# Patient Record
Sex: FEMALE | Race: WHITE | NOT HISPANIC OR LATINO | Employment: FULL TIME | URBAN - METROPOLITAN AREA
[De-identification: names, ages, dates, MRNs, and addresses within clinical notes are randomized per-mention and may not be internally consistent; named-entity substitution may affect disease eponyms.]

---

## 2017-07-06 ENCOUNTER — OFFICE VISIT (OUTPATIENT)
Dept: URGENT CARE | Facility: CLINIC | Age: 27
End: 2017-07-06
Payer: COMMERCIAL

## 2017-07-06 VITALS
BODY MASS INDEX: 39.24 KG/M2 | OXYGEN SATURATION: 99 % | SYSTOLIC BLOOD PRESSURE: 110 MMHG | RESPIRATION RATE: 17 BRPM | HEIGHT: 67 IN | DIASTOLIC BLOOD PRESSURE: 80 MMHG | WEIGHT: 250 LBS | TEMPERATURE: 97.3 F | HEART RATE: 77 BPM

## 2017-07-06 DIAGNOSIS — T78.40XA ALLERGIC REACTION, INITIAL ENCOUNTER: ICD-10-CM

## 2017-07-06 PROCEDURE — 99214 OFFICE O/P EST MOD 30 MIN: CPT | Performed by: PHYSICIAN ASSISTANT

## 2017-07-06 RX ORDER — METHYLPREDNISOLONE SODIUM SUCCINATE 125 MG/2ML
125 INJECTION, POWDER, LYOPHILIZED, FOR SOLUTION INTRAMUSCULAR; INTRAVENOUS ONCE
Status: COMPLETED | OUTPATIENT
Start: 2017-07-06 | End: 2017-07-06

## 2017-07-06 RX ORDER — METHYLPREDNISOLONE 4 MG/1
TABLET ORAL
Qty: 1 KIT | Refills: 0 | Status: SHIPPED | OUTPATIENT
Start: 2017-07-06 | End: 2017-09-08

## 2017-07-06 RX ORDER — DIPHENHYDRAMINE HCL 25 MG
50 TABLET ORAL EVERY 6 HOURS PRN
COMMUNITY
End: 2017-09-08

## 2017-07-06 RX ORDER — TRIAMCINOLONE ACETONIDE 1 MG/G
CREAM TOPICAL
Qty: 1 TUBE | Refills: 0 | Status: SHIPPED | OUTPATIENT
Start: 2017-07-06 | End: 2017-09-08

## 2017-07-06 RX ADMIN — METHYLPREDNISOLONE SODIUM SUCCINATE 125 MG: 125 INJECTION, POWDER, LYOPHILIZED, FOR SOLUTION INTRAMUSCULAR; INTRAVENOUS at 19:00

## 2017-07-06 ASSESSMENT — ENCOUNTER SYMPTOMS
WHEEZING: 0
NAUSEA: 0
ABDOMINAL PAIN: 0
VOMITING: 0
CHILLS: 0
FEVER: 0
SHORTNESS OF BREATH: 0
DIARRHEA: 0

## 2017-07-07 NOTE — PROGRESS NOTES
Subjective:      Jaqui Kumar is a 27 y.o. female who presents with Allergic Reaction            Allergic Reaction  Associated symptoms include a rash. Pertinent negatives include no abdominal pain, chest pain, chills, congestion, coughing, diaphoresis, fever, nausea or vomiting.   c/o itchy rash, onset today, took some benadryl around 3pm, noted no change in itchiness w/ benadyrl, denies PMH of seasonal allerg, denies using new poroducts or suspected triggers, same detergent/soap/shampoo/conditioner/lotions, etc - denies new medications, denies new food products, PMH of gluten free, wheat intolerance - get nausea, denies PMH of allergies, can get runny nose from hay products. Typically no seasonalallerg. Denies any respiratory symptoms. Denies cough or stridor. Denies past medical history of anaphylaxis. Denies shortness of breath. C/o itchy rash to body, c/o itch to throat but denies sensation of swelling/cough or restricted breathing.      Review of Systems   Constitutional: Negative for fever, chills and diaphoresis.   HENT: Negative for congestion.    Respiratory: Negative for cough, shortness of breath, wheezing and stridor.    Cardiovascular: Negative for chest pain and palpitations.   Gastrointestinal: Negative for nausea, vomiting, abdominal pain and diarrhea.   Skin: Positive for itching and rash.   Neurological: Negative for dizziness, tingling, sensory change, speech change and loss of consciousness.   Endo/Heme/Allergies: Negative for environmental allergies.       PMH:  has no past medical history of Clotting disorder, Diabetes, or Type II or unspecified type diabetes mellitus without mention of complication, not stated as uncontrolled.  MEDS:   Current outpatient prescriptions:   •  cyclobenzaprine (FLEXERIL) 10 MG Tab, Take 1 Tab by mouth 3 times a day as needed., Disp: 15 Tab, Rfl: 0  •  hydrocodone-acetaminophen (NORCO) 7.5-325 MG per tablet, Take 1 Tab by mouth every 8 hours as needed., Disp: 12  "Tab, Rfl: 0  •  MedroxyPROGESTERone Acetate (DEPO-PROVERA IM), by Intramuscular route., Disp: , Rfl:   •  hydrocodone-acetaminophen (NORCO) 5-325 MG TABS per tablet, Take 1-2 Tabs by mouth every 6 hours as needed., Disp: 20 Tab, Rfl: 0  ALLERGIES: No Known Allergies  SURGHX: No past surgical history on file.  SOCHX:  reports that she has never smoked. She does not have any smokeless tobacco history on file. She reports that she drinks alcohol. She reports that she does not use illicit drugs.  FH: Family history was reviewed, no pertinent findings to report     Objective:     /80 mmHg  Pulse 77  Temp(Src) 36.3 °C (97.3 °F)  Resp 17  Ht 1.702 m (5' 7.01\")  Wt 113.399 kg (250 lb)  BMI 39.15 kg/m2  SpO2 99%     Physical Exam   Constitutional: She is oriented to person, place, and time. She appears well-developed and well-nourished. No distress.   HENT:   Head: Normocephalic and atraumatic.   Right Ear: Tympanic membrane, external ear and ear canal normal.   Left Ear: Tympanic membrane, external ear and ear canal normal.   Nose: Nose normal.   Mouth/Throat: Uvula is midline, oropharynx is clear and moist and mucous membranes are normal. No uvula swelling. No posterior oropharyngeal edema or posterior oropharyngeal erythema.   Eyes: Conjunctivae and lids are normal. Right eye exhibits no discharge. Left eye exhibits no discharge. No scleral icterus.   Neck: Neck supple.   Pulmonary/Chest: Effort normal and breath sounds normal. No accessory muscle usage. No tachypnea. No respiratory distress. She has no decreased breath sounds. She has no wheezes. She has no rhonchi. She has no rales.   Musculoskeletal: Normal range of motion.   Neurological: She is alert and oriented to person, place, and time. She is not disoriented.   Skin: Skin is warm, dry and intact. Rash noted. Rash is urticarial ( somewhat.. raised erythematous, poorly defined borders, some edema to bilat ear lobes, facea and chest). Rash is not " pustular and not vesicular. She is not diaphoretic. There is erythema. No pallor.   Psychiatric: Her speech is normal and behavior is normal.   Nursing note and vitals reviewed.         Solumedrol in clinic - tolerates well     Assessment/Plan:   1. Allergic reaction, initial encounter  Supportive care is reviewed with patient/caregiver - recommend to push PO fluids and electrolytes, stress the importance of trending resolution, offered referral to allergist - declines will call if desires, Cautioned regarding potential side effects of steroid, avoid nsaids while using  ER precautions with any worsening symptoms are reviewed with patient/caregiver and they do express understanding  Return to clinic with lack of resolution or progression of symptoms.    - methylPREDNISolone sod succ (SOLU-MEDROL) 125 MG injection 125 mg; 2 mL by Intramuscular route Once.  - MethylPREDNISolone (MEDROL DOSEPAK) 4 MG Tablet Therapy Pack; Take as directed on package. Dispense one package.  Dispense: 1 Kit; Refill: 0  - triamcinolone acetonide (KENALOG) 0.1 % Cream; Apply to affected area BID  Dispense: 1 Tube; Refill: 0

## 2017-07-15 ASSESSMENT — ENCOUNTER SYMPTOMS
PALPITATIONS: 0
SENSORY CHANGE: 0
SPEECH CHANGE: 0
DIZZINESS: 0
TINGLING: 0
LOSS OF CONSCIOUSNESS: 0
STRIDOR: 0
DIAPHORESIS: 0
COUGH: 0

## 2017-08-04 ENCOUNTER — HOSPITAL ENCOUNTER (OUTPATIENT)
Dept: RADIOLOGY | Facility: MEDICAL CENTER | Age: 27
End: 2017-08-04
Attending: COLON & RECTAL SURGERY
Payer: COMMERCIAL

## 2017-08-04 DIAGNOSIS — Z01.818 PREOP EXAMINATION: ICD-10-CM

## 2017-08-04 DIAGNOSIS — E66.01 MORBID OBESITY, UNSPECIFIED OBESITY TYPE (HCC): ICD-10-CM

## 2017-08-04 PROCEDURE — A9270 NON-COVERED ITEM OR SERVICE: HCPCS | Performed by: COLON & RECTAL SURGERY

## 2017-08-04 PROCEDURE — 700112 HCHG RX REV CODE 229: Performed by: COLON & RECTAL SURGERY

## 2017-08-04 PROCEDURE — 74241 DX-UPPER GI-SERIES WITH KUB: CPT

## 2017-08-04 RX ADMIN — ANTACID/ANTIFLATULENT 1 PACKET: 380; 550; 10; 10 GRANULE, EFFERVESCENT ORAL at 14:00

## 2017-09-08 ENCOUNTER — HOSPITAL ENCOUNTER (OUTPATIENT)
Dept: RADIOLOGY | Facility: MEDICAL CENTER | Age: 27
DRG: 621 | End: 2017-09-08
Attending: COLON & RECTAL SURGERY | Admitting: COLON & RECTAL SURGERY
Payer: COMMERCIAL

## 2017-09-08 DIAGNOSIS — Z01.811 PRE-OPERATIVE RESPIRATORY EXAMINATION: ICD-10-CM

## 2017-09-08 DIAGNOSIS — Z01.812 PRE-OPERATIVE LABORATORY EXAMINATION: ICD-10-CM

## 2017-09-08 DIAGNOSIS — Z01.810 PRE-OPERATIVE CARDIOVASCULAR EXAMINATION: ICD-10-CM

## 2017-09-08 LAB
ALBUMIN SERPL BCP-MCNC: 4 G/DL (ref 3.2–4.9)
ALBUMIN/GLOB SERPL: 1.3 G/DL
ALP SERPL-CCNC: 45 U/L (ref 30–99)
ALT SERPL-CCNC: 14 U/L (ref 2–50)
ANION GAP SERPL CALC-SCNC: 10 MMOL/L (ref 0–11.9)
APTT PPP: 32.1 SEC (ref 24.7–36)
AST SERPL-CCNC: 15 U/L (ref 12–45)
BILIRUB SERPL-MCNC: 0.5 MG/DL (ref 0.1–1.5)
BUN SERPL-MCNC: 15 MG/DL (ref 8–22)
CALCIUM SERPL-MCNC: 9.1 MG/DL (ref 8.5–10.5)
CHLORIDE SERPL-SCNC: 107 MMOL/L (ref 96–112)
CO2 SERPL-SCNC: 21 MMOL/L (ref 20–33)
CREAT SERPL-MCNC: 0.63 MG/DL (ref 0.5–1.4)
EKG IMPRESSION: NORMAL
ERYTHROCYTE [DISTWIDTH] IN BLOOD BY AUTOMATED COUNT: 39.5 FL (ref 35.9–50)
EST. AVERAGE GLUCOSE BLD GHB EST-MCNC: 123 MG/DL
GFR SERPL CREATININE-BSD FRML MDRD: >60 ML/MIN/1.73 M 2
GLOBULIN SER CALC-MCNC: 3.1 G/DL (ref 1.9–3.5)
GLUCOSE SERPL-MCNC: 85 MG/DL (ref 65–99)
HBA1C MFR BLD: 5.9 % (ref 0–5.6)
HCT VFR BLD AUTO: 37.3 % (ref 37–47)
HGB BLD-MCNC: 13 G/DL (ref 12–16)
INR PPP: 0.93 (ref 0.87–1.13)
MCH RBC QN AUTO: 30 PG (ref 27–33)
MCHC RBC AUTO-ENTMCNC: 34.9 G/DL (ref 33.6–35)
MCV RBC AUTO: 86.1 FL (ref 81.4–97.8)
PLATELET # BLD AUTO: 269 K/UL (ref 164–446)
PMV BLD AUTO: 10.7 FL (ref 9–12.9)
POTASSIUM SERPL-SCNC: 3.8 MMOL/L (ref 3.6–5.5)
PROT SERPL-MCNC: 7.1 G/DL (ref 6–8.2)
PROTHROMBIN TIME: 12.8 SEC (ref 12–14.6)
RBC # BLD AUTO: 4.33 M/UL (ref 4.2–5.4)
SODIUM SERPL-SCNC: 138 MMOL/L (ref 135–145)
WBC # BLD AUTO: 7.2 K/UL (ref 4.8–10.8)

## 2017-09-08 PROCEDURE — 93010 ELECTROCARDIOGRAM REPORT: CPT | Performed by: INTERNAL MEDICINE

## 2017-09-08 PROCEDURE — 85027 COMPLETE CBC AUTOMATED: CPT

## 2017-09-08 PROCEDURE — 36415 COLL VENOUS BLD VENIPUNCTURE: CPT

## 2017-09-08 PROCEDURE — 80053 COMPREHEN METABOLIC PANEL: CPT

## 2017-09-08 PROCEDURE — 71010 DX-CHEST-LIMITED (1 VIEW): CPT

## 2017-09-08 PROCEDURE — 93005 ELECTROCARDIOGRAM TRACING: CPT

## 2017-09-08 PROCEDURE — 85730 THROMBOPLASTIN TIME PARTIAL: CPT

## 2017-09-08 PROCEDURE — 83036 HEMOGLOBIN GLYCOSYLATED A1C: CPT

## 2017-09-08 PROCEDURE — 85610 PROTHROMBIN TIME: CPT

## 2017-09-08 NOTE — OR NURSING
Reviewed patient's medical history with Dr. Waterman, based upon information available, Dr. Waterman indicates that the patient is a candidate to have the procedure on 9/22/17

## 2017-09-22 ENCOUNTER — HOSPITAL ENCOUNTER (INPATIENT)
Facility: MEDICAL CENTER | Age: 27
LOS: 1 days | DRG: 621 | End: 2017-09-23
Attending: COLON & RECTAL SURGERY | Admitting: COLON & RECTAL SURGERY
Payer: COMMERCIAL

## 2017-09-22 PROBLEM — E66.01 MORBID OBESITY (HCC): Status: ACTIVE | Noted: 2017-09-22

## 2017-09-22 LAB
B-HCG FREE SERPL-ACNC: <5 MIU/ML
IHCGL IHCGL: NEGATIVE MIU/ML
PATHOLOGY CONSULT NOTE: NORMAL

## 2017-09-22 PROCEDURE — 160041 HCHG SURGERY MINUTES - EA ADDL 1 MIN LEVEL 4: Performed by: COLON & RECTAL SURGERY

## 2017-09-22 PROCEDURE — 501399 HCHG SPECIMAN BAG, ENDO CATC: Performed by: COLON & RECTAL SURGERY

## 2017-09-22 PROCEDURE — 501583 HCHG TROCAR, THRD CAN&SEAL 5X100: Performed by: COLON & RECTAL SURGERY

## 2017-09-22 PROCEDURE — 0FB24ZX EXCISION OF LEFT LOBE LIVER, PERCUTANEOUS ENDOSCOPIC APPROACH, DIAGNOSTIC: ICD-10-PCS | Performed by: COLON & RECTAL SURGERY

## 2017-09-22 PROCEDURE — 500522 HCHG ENDOSTITCH SUTURING DEVICE: Performed by: COLON & RECTAL SURGERY

## 2017-09-22 PROCEDURE — 160009 HCHG ANES TIME/MIN: Performed by: COLON & RECTAL SURGERY

## 2017-09-22 PROCEDURE — 502000 HCHG MISC OR IMPLANTS RC 0278: Performed by: COLON & RECTAL SURGERY

## 2017-09-22 PROCEDURE — 502571 HCHG PACK, LAP CHOLE: Performed by: COLON & RECTAL SURGERY

## 2017-09-22 PROCEDURE — 700111 HCHG RX REV CODE 636 W/ 250 OVERRIDE (IP): Performed by: PHYSICIAN ASSISTANT

## 2017-09-22 PROCEDURE — 500800 HCHG LAPAROSCOPIC J/L HOOK: Performed by: COLON & RECTAL SURGERY

## 2017-09-22 PROCEDURE — 88305 TISSUE EXAM BY PATHOLOGIST: CPT

## 2017-09-22 PROCEDURE — 500448 HCHG DRESSING, TELFA 3X4: Performed by: COLON & RECTAL SURGERY

## 2017-09-22 PROCEDURE — 160048 HCHG OR STATISTICAL LEVEL 1-5: Performed by: COLON & RECTAL SURGERY

## 2017-09-22 PROCEDURE — 160036 HCHG PACU - EA ADDL 30 MINS PHASE I: Performed by: COLON & RECTAL SURGERY

## 2017-09-22 PROCEDURE — 88313 SPECIAL STAINS GROUP 2: CPT | Mod: 91

## 2017-09-22 PROCEDURE — 501570 HCHG TROCAR, SEPARATOR: Performed by: COLON & RECTAL SURGERY

## 2017-09-22 PROCEDURE — 0BQR4ZZ REPAIR RIGHT DIAPHRAGM, PERCUTANEOUS ENDOSCOPIC APPROACH: ICD-10-PCS | Performed by: COLON & RECTAL SURGERY

## 2017-09-22 PROCEDURE — 500521 HCHG ENDOSTITCH LOAD UNIT: Performed by: COLON & RECTAL SURGERY

## 2017-09-22 PROCEDURE — 700101 HCHG RX REV CODE 250

## 2017-09-22 PROCEDURE — 700111 HCHG RX REV CODE 636 W/ 250 OVERRIDE (IP)

## 2017-09-22 PROCEDURE — 770006 HCHG ROOM/CARE - MED/SURG/GYN SEMI*

## 2017-09-22 PROCEDURE — 502240 HCHG MISC OR SUPPLY RC 0272: Performed by: COLON & RECTAL SURGERY

## 2017-09-22 PROCEDURE — 700105 HCHG RX REV CODE 258: Performed by: PHYSICIAN ASSISTANT

## 2017-09-22 PROCEDURE — 160035 HCHG PACU - 1ST 60 MINS PHASE I: Performed by: COLON & RECTAL SURGERY

## 2017-09-22 PROCEDURE — 0BQS4ZZ REPAIR LEFT DIAPHRAGM, PERCUTANEOUS ENDOSCOPIC APPROACH: ICD-10-PCS | Performed by: COLON & RECTAL SURGERY

## 2017-09-22 PROCEDURE — 160002 HCHG RECOVERY MINUTES (STAT): Performed by: COLON & RECTAL SURGERY

## 2017-09-22 PROCEDURE — 0DB64Z3 EXCISION OF STOMACH, PERCUTANEOUS ENDOSCOPIC APPROACH, VERTICAL: ICD-10-PCS | Performed by: COLON & RECTAL SURGERY

## 2017-09-22 PROCEDURE — 501497 HCHG SURGICLIP: Performed by: COLON & RECTAL SURGERY

## 2017-09-22 PROCEDURE — 160029 HCHG SURGERY MINUTES - 1ST 30 MINS LEVEL 4: Performed by: COLON & RECTAL SURGERY

## 2017-09-22 PROCEDURE — 88307 TISSUE EXAM BY PATHOLOGIST: CPT

## 2017-09-22 PROCEDURE — 84702 CHORIONIC GONADOTROPIN TEST: CPT

## 2017-09-22 PROCEDURE — 501624 HCHG TUBE, GASTROSTOMY COMPAT: Performed by: COLON & RECTAL SURGERY

## 2017-09-22 PROCEDURE — 501838 HCHG SUTURE GENERAL: Performed by: COLON & RECTAL SURGERY

## 2017-09-22 DEVICE — TISSEEL 4ML ----MUST ORDER A MIN OF 6EA----: Type: IMPLANTABLE DEVICE | Status: FUNCTIONAL

## 2017-09-22 RX ORDER — HALOPERIDOL 5 MG/ML
1 INJECTION INTRAMUSCULAR EVERY 6 HOURS PRN
Status: DISCONTINUED | OUTPATIENT
Start: 2017-09-22 | End: 2017-09-23 | Stop reason: HOSPADM

## 2017-09-22 RX ORDER — PROMETHAZINE HYDROCHLORIDE 25 MG/1
25 SUPPOSITORY RECTAL EVERY 6 HOURS PRN
Status: DISCONTINUED | OUTPATIENT
Start: 2017-09-22 | End: 2017-09-23 | Stop reason: HOSPADM

## 2017-09-22 RX ORDER — BUPIVACAINE HYDROCHLORIDE AND EPINEPHRINE 5; 5 MG/ML; UG/ML
INJECTION, SOLUTION PERINEURAL
Status: DISCONTINUED | OUTPATIENT
Start: 2017-09-22 | End: 2017-09-22 | Stop reason: HOSPADM

## 2017-09-22 RX ORDER — MORPHINE SULFATE 4 MG/ML
4 INJECTION, SOLUTION INTRAMUSCULAR; INTRAVENOUS ONCE
Status: COMPLETED | OUTPATIENT
Start: 2017-09-22 | End: 2017-09-22

## 2017-09-22 RX ORDER — SODIUM CHLORIDE, SODIUM LACTATE, POTASSIUM CHLORIDE, CALCIUM CHLORIDE 600; 310; 30; 20 MG/100ML; MG/100ML; MG/100ML; MG/100ML
1000 INJECTION, SOLUTION INTRAVENOUS
Status: COMPLETED | OUTPATIENT
Start: 2017-09-22 | End: 2017-09-22

## 2017-09-22 RX ORDER — ONDANSETRON 2 MG/ML
4 INJECTION INTRAMUSCULAR; INTRAVENOUS EVERY 4 HOURS PRN
Status: DISCONTINUED | OUTPATIENT
Start: 2017-09-22 | End: 2017-09-23 | Stop reason: HOSPADM

## 2017-09-22 RX ORDER — SCOLOPAMINE TRANSDERMAL SYSTEM 1 MG/1
1 PATCH, EXTENDED RELEASE TRANSDERMAL
Status: DISCONTINUED | OUTPATIENT
Start: 2017-09-22 | End: 2017-09-23 | Stop reason: HOSPADM

## 2017-09-22 RX ORDER — ENALAPRILAT 1.25 MG/ML
2.5 INJECTION INTRAVENOUS EVERY 6 HOURS PRN
Status: DISCONTINUED | OUTPATIENT
Start: 2017-09-22 | End: 2017-09-23 | Stop reason: HOSPADM

## 2017-09-22 RX ORDER — DIPHENHYDRAMINE HCL 25 MG
25 TABLET ORAL EVERY 6 HOURS PRN
Status: DISCONTINUED | OUTPATIENT
Start: 2017-09-22 | End: 2017-09-23 | Stop reason: HOSPADM

## 2017-09-22 RX ORDER — DIPHENHYDRAMINE HYDROCHLORIDE 50 MG/ML
25 INJECTION INTRAMUSCULAR; INTRAVENOUS EVERY 6 HOURS PRN
Status: DISCONTINUED | OUTPATIENT
Start: 2017-09-22 | End: 2017-09-23 | Stop reason: HOSPADM

## 2017-09-22 RX ADMIN — HYDROMORPHONE HYDROCHLORIDE 0.4 MG: 1 INJECTION, SOLUTION INTRAMUSCULAR; INTRAVENOUS; SUBCUTANEOUS at 12:07

## 2017-09-22 RX ADMIN — FAMOTIDINE 20 MG: 10 INJECTION INTRAVENOUS at 13:50

## 2017-09-22 RX ADMIN — POTASSIUM CHLORIDE: 149 INJECTION, SOLUTION, CONCENTRATE INTRAVENOUS at 15:59

## 2017-09-22 RX ADMIN — HYDROMORPHONE HYDROCHLORIDE 0.2 MG: 1 INJECTION, SOLUTION INTRAMUSCULAR; INTRAVENOUS; SUBCUTANEOUS at 11:59

## 2017-09-22 RX ADMIN — POTASSIUM CHLORIDE: 149 INJECTION, SOLUTION, CONCENTRATE INTRAVENOUS at 22:33

## 2017-09-22 RX ADMIN — SODIUM CHLORIDE, SODIUM LACTATE, POTASSIUM CHLORIDE, CALCIUM CHLORIDE 1000 ML: 600; 310; 30; 20 INJECTION, SOLUTION INTRAVENOUS at 08:43

## 2017-09-22 RX ADMIN — HYDROMORPHONE HYDROCHLORIDE 0.2 MG: 1 INJECTION, SOLUTION INTRAMUSCULAR; INTRAVENOUS; SUBCUTANEOUS at 11:44

## 2017-09-22 RX ADMIN — HYDROMORPHONE HYDROCHLORIDE 0.5 MG: 1 INJECTION, SOLUTION INTRAMUSCULAR; INTRAVENOUS; SUBCUTANEOUS at 11:25

## 2017-09-22 RX ADMIN — FAMOTIDINE 20 MG: 10 INJECTION INTRAVENOUS at 20:29

## 2017-09-22 RX ADMIN — HYDROMORPHONE HYDROCHLORIDE 0.2 MG: 1 INJECTION, SOLUTION INTRAMUSCULAR; INTRAVENOUS; SUBCUTANEOUS at 11:53

## 2017-09-22 RX ADMIN — MORPHINE SULFATE 4 MG: 4 INJECTION INTRAVENOUS at 13:50

## 2017-09-22 RX ADMIN — HYDROMORPHONE HYDROCHLORIDE 0.5 MG: 1 INJECTION, SOLUTION INTRAMUSCULAR; INTRAVENOUS; SUBCUTANEOUS at 11:37

## 2017-09-22 RX ADMIN — Medication 50 MG: at 15:58

## 2017-09-22 ASSESSMENT — PATIENT HEALTH QUESTIONNAIRE - PHQ9
2. FEELING DOWN, DEPRESSED, IRRITABLE, OR HOPELESS: NOT AT ALL
SUM OF ALL RESPONSES TO PHQ QUESTIONS 1-9: 0
1. LITTLE INTEREST OR PLEASURE IN DOING THINGS: NOT AT ALL
SUM OF ALL RESPONSES TO PHQ9 QUESTIONS 1 AND 2: 0

## 2017-09-22 ASSESSMENT — PAIN SCALES - GENERAL
PAINLEVEL_OUTOF10: 6
PAINLEVEL_OUTOF10: 4
PAINLEVEL_OUTOF10: 7
PAINLEVEL_OUTOF10: ASSUMED PAIN PRESENT
PAINLEVEL_OUTOF10: 7
PAINLEVEL_OUTOF10: 9
PAINLEVEL_OUTOF10: ASSUMED PAIN PRESENT

## 2017-09-22 ASSESSMENT — LIFESTYLE VARIABLES
ALCOHOL_USE: NO
EVER_SMOKED: NEVER
EVER_SMOKED: NEVER

## 2017-09-22 NOTE — OP REPORT
NAME:  Jaqui Kumar  MRN:  [id number]  :  1990      DATE OF OPERATION: 2017    PREOPERATIVE DIAGNOSIS: Morbid Obesity with medical sequelae; Congested Fatty Liver Disease    POSTOPERATIVE DIAGNOSIS: Morbid Obesity with medical sequelae; Congested Fatty Liver Disease; Hiatal hernia    OPERATION PERFORMED: 1.  Laparoscopic Sleeve Gastrectomy  2.  Left Lobe Liver Biopsy  3. Hiatal hernia repair    SURGEON: Darius Segura MD    ASSISTANT:  Inés Azevedo PA-C    ANESTHESIOLOGIST:  Blas Zavala MD., MD    ANESTHESIA: General endotracheal anesthesia.     SPECIMEN: Stomach; Liver Biopsy    ESTIMATED BLOOD LOSS: <10cc.     INDICATIONS: The patient is a 27 y.o. female with a diagnosis of morbid obesity with medical sequelae. She is taken to the operating room today for Laparoscopic Sleeve Gastrectomy, hiatal hernia repair and liver biopsy.     PROCEDURE: Following informed consent, the patient was properly identified, taken to the operating room, and placed in the supine position where general endotracheal anesthesia was administered. Intravenous antibiotics were administered by the anesthesiologist in the correct time interval. Sequential compression devices were employed. The abdomen was prepped and draped into a sterile field.     An optical entry bladeless  trocar was utilized and pneumoperitoneum carefully established in the usual fashion.  The bladeless 5 mm separator trocar was introduced and the 5 mm lens/camera was passed into the peritoneal cavity.  Three additional separator trocars were placed under direct vision.  A 5 mm Antonio-type liver retractor was placed into position.  This was used to elevate the left sided segment of the liver.  It was secured to the patients right side with a robot arm.  Careful inspection revealed no untoward events with placement of the trocars.    The gastrocolic omentum was examined and dissected with the ligasure device and a point on the distal antrum was  selected to begin the sleeve gastrectomy.  A 40 [place] bougie was then passed down into the antrum.  A careful inspection at the hiatus demonstrated a significant hiatal hernia which would require repair or risk significant reflux. A Flipaste linear stapler with a thick-tissue cartridges, was employed to divide partway across the stomach.  With the bougie in position, the stapler was then used to march proximally along the stomach and transsection of the stomach was performed, beginning 5 cm proximal to the pylorus in the method of the sleeve gastrectomy.  The greater curvature aspect of the stomach was then dissected and the greater curvature vessels and short gastric vessels were divided with the ligasure.      The last endomechanical stapler firings were used to complete the transection of the stomach.  Careful inspection of the staple line demonstrated excellent, meticulous hemostasis and a completely intact staple line with seemless tissue approximation.  Seromuscular sutures were placed using polysorb suture to further secure the staple line.  The liver exhibited hepatic steatosis.  A [place] liver biopsy was obtained from the left lobe of the liver and sent for pathology.  Hemostasis on the liver was achieved with cautery. The 3cm hiatal hernia was repaired with anterior cruroplasty using 0-ethibond sutures.  The bougie was then removed.     The large endocatch bag was then used to retrieve the stomach specimen.  Tisseal fibrin glue sealant was sprayed along the entire staple line. The ports were removed under direct vision and the pneumoperitoneum was allowed to escape. The fascia of this port was closed with 0-Vicryl suture.  The port sites were then irrigated well.  The port site skin incisions were closed with interrupted 4-0 Vicryl subcuticular sutures.  Steri-Strips and Benzoin were applied beneath sterile Band-Aids.     The patient tolerated the procedure well and there were no apparent complications.  All sponge, needle, and instrument counts were correct on 2 separate occasions. She was awakened, extubated, and transferred to the recovery room in satisfactory condition.       ____________________________________   Darius Segura MD  DD: 9/22/2017  10:18 AM    CC:  [facility];

## 2017-09-22 NOTE — PROGRESS NOTES
Pt arrived to unit from PACU via gurney. Pt tolerated well transfer with slide board. Pt Complaining of pain, medication given per MAR.

## 2017-09-22 NOTE — OR NURSING
1120- Pt to pacu via gurney with side rails up.  VSS.  Pt arouses to voice, c/o abd pain. Lap sites x 5 to abd with steri strips and band aids. L abd lap side with small amount bloody drainage on dressing, all others cdi.  1135- VSS.  See Mar for med given. Pt denies nausea.  1150- continuing to work on pain control.  1205- VSS.  Mom given update via phone.  1213- Pt back on O2, dipped sat to 87% on RA.  1219- Pt resting quietly, Pt states pain now 4/10.   1226- Report to Ashely REYES.

## 2017-09-23 VITALS
OXYGEN SATURATION: 97 % | WEIGHT: 279.98 LBS | BODY MASS INDEX: 45 KG/M2 | HEART RATE: 55 BPM | HEIGHT: 66 IN | SYSTOLIC BLOOD PRESSURE: 135 MMHG | RESPIRATION RATE: 18 BRPM | TEMPERATURE: 98.8 F | DIASTOLIC BLOOD PRESSURE: 68 MMHG

## 2017-09-23 LAB
ALBUMIN SERPL BCP-MCNC: 3.4 G/DL (ref 3.2–4.9)
ALBUMIN/GLOB SERPL: 1.1 G/DL
ALP SERPL-CCNC: 38 U/L (ref 30–99)
ALT SERPL-CCNC: 27 U/L (ref 2–50)
ANION GAP SERPL CALC-SCNC: 7 MMOL/L (ref 0–11.9)
AST SERPL-CCNC: 26 U/L (ref 12–45)
BILIRUB SERPL-MCNC: 0.6 MG/DL (ref 0.1–1.5)
BUN SERPL-MCNC: 9 MG/DL (ref 8–22)
CALCIUM SERPL-MCNC: 8.6 MG/DL (ref 8.4–10.2)
CHLORIDE SERPL-SCNC: 109 MMOL/L (ref 96–112)
CO2 SERPL-SCNC: 20 MMOL/L (ref 20–33)
CREAT SERPL-MCNC: 0.55 MG/DL (ref 0.5–1.4)
ERYTHROCYTE [DISTWIDTH] IN BLOOD BY AUTOMATED COUNT: 38.5 FL (ref 35.9–50)
GFR SERPL CREATININE-BSD FRML MDRD: >60 ML/MIN/1.73 M 2
GLOBULIN SER CALC-MCNC: 3 G/DL (ref 1.9–3.5)
GLUCOSE SERPL-MCNC: 98 MG/DL (ref 65–99)
HCT VFR BLD AUTO: 35.4 % (ref 37–47)
HGB BLD-MCNC: 12.6 G/DL (ref 12–16)
MCH RBC QN AUTO: 29.8 PG (ref 27–33)
MCHC RBC AUTO-ENTMCNC: 35.6 G/DL (ref 33.6–35)
MCV RBC AUTO: 83.7 FL (ref 81.4–97.8)
PLATELET # BLD AUTO: 248 K/UL (ref 164–446)
PMV BLD AUTO: 11.2 FL (ref 9–12.9)
POTASSIUM SERPL-SCNC: 4 MMOL/L (ref 3.6–5.5)
PROT SERPL-MCNC: 6.4 G/DL (ref 6–8.2)
RBC # BLD AUTO: 4.23 M/UL (ref 4.2–5.4)
SODIUM SERPL-SCNC: 136 MMOL/L (ref 135–145)
WBC # BLD AUTO: 11.5 K/UL (ref 4.8–10.8)

## 2017-09-23 PROCEDURE — 700105 HCHG RX REV CODE 258: Performed by: PHYSICIAN ASSISTANT

## 2017-09-23 PROCEDURE — 80053 COMPREHEN METABOLIC PANEL: CPT

## 2017-09-23 PROCEDURE — 700111 HCHG RX REV CODE 636 W/ 250 OVERRIDE (IP): Performed by: PHYSICIAN ASSISTANT

## 2017-09-23 PROCEDURE — 85027 COMPLETE CBC AUTOMATED: CPT

## 2017-09-23 PROCEDURE — 36415 COLL VENOUS BLD VENIPUNCTURE: CPT

## 2017-09-23 PROCEDURE — 700102 HCHG RX REV CODE 250 W/ 637 OVERRIDE(OP): Performed by: PHYSICIAN ASSISTANT

## 2017-09-23 PROCEDURE — A9270 NON-COVERED ITEM OR SERVICE: HCPCS | Performed by: PHYSICIAN ASSISTANT

## 2017-09-23 RX ADMIN — ENOXAPARIN SODIUM 40 MG: 100 INJECTION SUBCUTANEOUS at 01:36

## 2017-09-23 RX ADMIN — ENOXAPARIN SODIUM 40 MG: 100 INJECTION SUBCUTANEOUS at 13:43

## 2017-09-23 RX ADMIN — HYDROCODONE BITARTRATE AND ACETAMINOPHEN 15 ML: 7.5; 325 SOLUTION ORAL at 08:56

## 2017-09-23 RX ADMIN — POTASSIUM CHLORIDE: 149 INJECTION, SOLUTION, CONCENTRATE INTRAVENOUS at 04:57

## 2017-09-23 ASSESSMENT — PAIN SCALES - GENERAL
PAINLEVEL_OUTOF10: ASSUMED PAIN PRESENT
PAINLEVEL_OUTOF10: ASSUMED PAIN PRESENT
PAINLEVEL_OUTOF10: 3

## 2017-09-23 NOTE — PROGRESS NOTES
Pt ambulated to bathroom.  Sanguinous drainage from upper left lap site. Pressure dressing applied. Pt ambulated back to bed w/out difficulty.

## 2017-09-23 NOTE — DISCHARGE INSTRUCTIONS
Bariatric D/C instructions:     1. DIET: Follow the diet progression detailed in your post-op booklet.  Progressing as instructed will make for a smooth transition after surgery and prevent any pain associated with eating foods before your stomach is ready or eating too much.  Water and hydration is much more important than food intake the first week or two after surgery.  Drink enough water to keep your urine pale yellow.  Increase water intake if your urine is a darker yellow or if have burning with urination.  Nausea and vomiting once or twice after you leave the hospital is normal.  Sip fluids continually and stay hydrated.     2.  SUPPLEMENTS: Start your supplements 1 week after surgery.  You may need to cut some supplements in half initially.  Follow the guidelines from your supplement handout from your pre-op class.     3. ACTIVITIES: After discharge from the hospital, you may resume full routine activities. However, there should be no heavy lifting (greater than 15 pounds) and no strenuous activities until after your follow-up visit. Otherwise, routine activities of daily living are acceptable.  More movement and walking after surgery the better.     4. DRIVING: You may drive whenever you are off pain medications and are able to perform the activities needed to drive, i.e. turning, bending, twisting, etc.     5. BATHING AND WOUND CARE: You may get the wound wet at any time after leaving the hospital. You may shower, but do not submerge in a bath for at least a week. Dressings may come off after 48 hours.  You may notice some clear or slightly bloody drainage from your wounds and there may be some mild redness or bruising around your incision sites.  This is normal.     6. BOWEL FUNCTION: Constipation is common after an operation, especially with pain medications. The combination of pain medication and decreased activity level can cause constipation in otherwise normal patients. If you feel this is occurring,  take a laxative (Milk of Magnesia, Miralax, etc.) until the problem has resolved.  Diarrhea the first few days after surgery can also be normal.  You may notice that it is dark in color or see blood, which is also normal and should subside in the first week post-op.     7. PAIN MEDICATION: You have been given a prescription for pain medication preoperatively.  Please take these as directed. It is important to remember not to take medications on an empty stomach as this may cause nausea.  For minimal discomfort you may use liquid Tylenol 650 mg every 4 hours.  DO NOT exceed 4,000 mg of Tylenol in 24 hours.  DO NOT use non steroidal anti-inflamatory medication such as: Asprin, Ibuprofen, Advil, Motrin, Aleve, or steroids.  These medication can cause ulcers after weight loss surgery.     8.CALL IF YOU HAVE: (1) Fevers to more than 101.5 F, (2) leg pain or swelling (3) Drainage or fluid from incision that may be foul smelling, increased tenderness or soreness at the wound or the wound edges are no longer together, redness or swelling at the incision site. (4) Night Sweats (5) Shaking, Chills (6) Persistent Nausea or Vomiting for over 24 hours.  Use your anti nausea medication as prescribed. (7) Call 911 if sudden onset of chest pain or shortness of breath that does not improve with 5-10 min of rest.     9. APPOINTMENT: Contact our office at [phone] for a follow-up appointment in 1 week following your procedure.  Our office hours are Monday-Friday, 8am-5pm.  Please try to call during these hours when we are better able to assist you.     If you have any additional questions, please do not hesitate to call the office and speak to either myself or the physician on call.     Office address:   [facility].   [address] [place] [address]   [address] [address]  Discharge Instructions    Discharged to home by car with relative. Discharged via wheelchair, hospital escort: Yes.  Special equipment needed: Not  Applicable    Be sure to schedule a follow-up appointment with your primary care doctor or any specialists as instructed.     Discharge Plan:   Influenza Vaccine Indication: Patient Refuses    I understand that a diet low in cholesterol, fat, and sodium is recommended for good health. Unless I have been given specific instructions below for another diet, I accept this instruction as my diet prescription.   Other diet: Gastric clears    Special Instructions: None    · Is patient discharged on Warfarin / Coumadin?   No     · Is patient Post Blood Transfusion?  No    Depression / Suicide Risk    As you are discharged from this [facility] facility, it is important to learn how to keep safe from harming yourself.    Recognize the warning signs:  · Abrupt changes in personality, positive or negative- including increase in energy   · Giving away possessions  · Change in eating patterns- significant weight changes-  positive or negative  · Change in sleeping patterns- unable to sleep or sleeping all the time   · Unwillingness or inability to communicate  · Depression  · Unusual sadness, discouragement and loneliness  · Talk of wanting to die  · Neglect of personal appearance   · Rebelliousness- reckless behavior  · Withdrawal from people/activities they love  · Confusion- inability to concentrate     If you or a loved one observes any of these behaviors or has concerns about self-harm, here's what you can do:  · Talk about it- your feelings and reasons for harming yourself  · Remove any means that you might use to hurt yourself (examples: pills, rope, extension cords, firearm)  · Get professional help from the community (Mental Health, Substance Abuse, psychological counseling)  · Do not be alone:Call your Safe Contact- someone whom you trust who will be there for you.  · Call your local CRISIS HOTLINE 938-5825 or 925-690-3992  · Call your local Children's Mobile Crisis Response Team [place] (835) 691-6782 or  www.[employer].Reveal  · Call the toll free National Suicide Prevention Hotlines   · National Suicide Prevention Lifeline 779-225-MZRJ (0163)  · National Hope Line Network 800-SUICIDE (398-3876)

## 2017-09-23 NOTE — DIETARY
BMI - Pt with BMI >40 (45.21).   Pt s/p Laparoscopic Sleeve Gastrectomy, anticipate follow-up with MD/RD post discharge.

## 2017-09-23 NOTE — PROGRESS NOTES
Patient was seen and examined.  Vital signs and labs reviewed.  Counseled patient on diet, actively level and progress this far.  Questions answered.

## 2017-09-23 NOTE — PROGRESS NOTES
Pt discharged to home. Discharge instructions given to pt. Pt verbalizes understanding of instructions at this time. IV removed per policy. Dressing applied to site. Pt tolerated well. Escorted to facility entrance by staff via wheelchair. Accompanied by mother. Transported via private vehicle.

## 2017-10-15 NOTE — DISCHARGE SUMMARY
DATE OF ADMISSION:  09/22/2017    DATE OF DISCHARGE:  09/23/2017    PRINCIPAL DIAGNOSIS:  Morbid obesity with medical sequelae.    PRINCIPAL PROCEDURE:  Laparoscopic sleeve gastrectomy.    HOSPITAL COURSE:  The patient is a 27-year-old female who completed the   preoperative evaluation and education process for bariatric surgery.  She was   admitted to the hospital and underwent the above surgical procedure.    Postoperatively, she did well, tolerating clear liquids without nausea or   vomiting.  She did admit to typical incisional abdominal pain.  Gradually, she   was able to transition off IV pain medications to oral pain medications with   good pain control.  Upon discharge, she was also ambulating the halls and   voiding without difficulty.  All other review of systems were negative.    Hemoglobin postoperatively was okay at 12.6.  White blood cell count 11.5,   mild leukocytosis, likely reactive.  All other lab values are unremarkable.    She was discharged home in good condition.    PHYSICAL EXAMINATION:  CONSTITUTIONAL:  She is alert and oriented x4.  Well developed and well   nourished.  No distress.  HEENT:  Head is normocephalic, atraumatic.  Eyes, conjunctivae are normal.  NECK:  Normal range of motion.  Neck is Supple.  CARDIOVASCULAR:  Normal rate and regular rhythm.  PULMONARY/CHEST:  Effort normal.  No respiratory distress.  ABDOMEN:  Soft, nondistended, incisional tenderness without rebound or   guarding.  MUSCULOSKELETAL:  Normal range of motion.  NEUROLOGIC:  She is alert and oriented to person, place, and time.  SKIN:  Warm and dry.  No rashes, erythema, or pallor.  Incision site is clean,   dry, and intact.  PSYCHIATRIC:  She has normal mood and affect.    DISCHARGE MEDICATIONS:  Resume home medications.  No NSAIDs.    PLAN:  The patient is being discharged home with antiemetics, pain medication,   home Lovenox injections, and PPI.  She should follow up in the office in 1   week.  The patient was  counseled to call or return sooner if she has   uncontrolled pain, fever, nausea, vomiting, chest pain, or signs of infection   at the wound site.       ____________________________________     ANTWAN Biggs    DD:  10/15/2017 09:44:01  DT:  10/15/2017 10:07:23    D#:  [id number]  Job#:  [id number]

## 2017-10-23 NOTE — PROGRESS NOTES
DATE OF SERVICE:  09/23/2017    DISCHARGE PROGRESS NOTE    HISTORY OF PRESENT ILLNESS:  The patient is postoperative day #1 following   laparoscopic sleeve gastrectomy.  She is doing well.  Tolerating clear liquids   without nausea or vomiting.  She admits to typical incisional abdominal pain.    She is ambulating some.  She is voiding without difficulty.    REVIEW OF SYSTEMS:  Negative except for what is noted in the HPI.    PHYSICAL EXAMINATION:  VITAL SIGNS:  Temperature 37.2, pulse 65, respirations 17, blood pressure   138/67, O2 saturation 97% on room air.  CONSTITUTIONAL:  She is oriented to person, place, and time.  She appears well   developed, well nourished.  HEENT:  HEAD:  Normocephalic and atraumatic.  EYES:  Conjunctivae are normal.  NECK:  Normal range of motion.  CARDIOVASCULAR:  Normal rate, regular rhythm.  PULMONARY:  Effort normal.  No respiratory stress.  ABDOMEN:  Soft, mildly distended, tender at incision site.  The incision is   clean, dry and intact.  There is no rebound and no guarding.  MUSCULOSKELETAL:  Normal range of motion.  NEUROLOGIC:  Alert and oriented to person, place, and time.  SKIN:  Warm and dry.  There is no rash, erythema, pallor.  The incisions are   clean, dry and intact.  PSYCHIATRIC:  She has normal mood and affect.    PERTINENT LABORATORY DATA:  WBC 11.5, hemoglobin 12.6.  All other labs within   normal limits.    ASSESSMENT AND PLAN:  She is doing well postop day #1 status post laparoscopic   sleeve gastrectomy.  Alert and oriented, NAD.  No respiratory distress.    Tolerating clears.  Vital signs stable.  Labs reviewed, WBC mildly elevated,   likely reactive.  Hemoglobin okay.  Encouraged ambulation and incentive   spirometry use.  Transitioned off IV pain medications to oral pain   medications.  She was transitioned to oral pain medication with good pain   control.  She should follow up in the office in 1 week.  Follow postop diet   plan.  Discussed with   Colton.       ____________________________________     ANTWAN Biggs    DD:  10/23/2017 07:27:06  DT:  10/23/2017 07:50:50    D#:  [id number]  Job#:  [id number]

## 2017-12-08 ENCOUNTER — OFFICE VISIT (OUTPATIENT)
Dept: NEUROLOGY | Facility: MEDICAL CENTER | Age: 27
End: 2017-12-08
Payer: COMMERCIAL

## 2017-12-08 VITALS
SYSTOLIC BLOOD PRESSURE: 120 MMHG | BODY MASS INDEX: 36.96 KG/M2 | DIASTOLIC BLOOD PRESSURE: 74 MMHG | WEIGHT: 230 LBS | OXYGEN SATURATION: 98 % | HEIGHT: 66 IN | HEART RATE: 68 BPM | TEMPERATURE: 98.1 F | RESPIRATION RATE: 16 BRPM

## 2017-12-08 DIAGNOSIS — H57.11 PAIN IN RIGHT EYE: ICD-10-CM

## 2017-12-08 DIAGNOSIS — G44.89 OTHER HEADACHE SYNDROME: ICD-10-CM

## 2017-12-08 PROCEDURE — 99204 OFFICE O/P NEW MOD 45 MIN: CPT | Performed by: NURSE PRACTITIONER

## 2017-12-08 RX ORDER — CYCLOBENZAPRINE HCL 10 MG
TABLET ORAL
Refills: 0 | COMMUNITY
Start: 2017-12-01 | End: 2019-08-20

## 2017-12-08 RX ORDER — OMEPRAZOLE 20 MG/1
20 CAPSULE, DELAYED RELEASE ORAL DAILY
Refills: 0 | COMMUNITY
Start: 2017-11-29

## 2017-12-08 RX ORDER — ONDANSETRON 4 MG/1
TABLET, ORALLY DISINTEGRATING ORAL
Refills: 0 | COMMUNITY
Start: 2017-10-16 | End: 2019-08-06

## 2017-12-08 ASSESSMENT — ENCOUNTER SYMPTOMS
ABDOMINAL PAIN: 0
DEPRESSION: 0
NERVOUS/ANXIOUS: 0
EYE PAIN: 1
MUSCULOSKELETAL NEGATIVE: 1
COUGH: 0
CONSTITUTIONAL NEGATIVE: 1
HEADACHES: 0
DOUBLE VISION: 1
DIARRHEA: 0
EYE DISCHARGE: 0
VOMITING: 0
NAUSEA: 0
EYE REDNESS: 0
SORE THROAT: 0

## 2017-12-08 ASSESSMENT — PATIENT HEALTH QUESTIONNAIRE - PHQ9: CLINICAL INTERPRETATION OF PHQ2 SCORE: 0

## 2017-12-08 ASSESSMENT — PAIN SCALES - GENERAL: PAINLEVEL: 3=SLIGHT PAIN
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
## 2017-12-15 ENCOUNTER — HOSPITAL ENCOUNTER (OUTPATIENT)
Dept: RADIOLOGY | Facility: MEDICAL CENTER | Age: 27
End: 2017-12-15
Attending: NURSE PRACTITIONER
Payer: COMMERCIAL

## 2017-12-15 DIAGNOSIS — M47.816 LUMBAR SPONDYLOSIS: ICD-10-CM

## 2017-12-15 DIAGNOSIS — H57.11 PAIN IN RIGHT EYE: ICD-10-CM

## 2017-12-15 PROCEDURE — 70553 MRI BRAIN STEM W/O & W/DYE: CPT

## 2017-12-15 PROCEDURE — 72100 X-RAY EXAM L-S SPINE 2/3 VWS: CPT

## 2017-12-15 PROCEDURE — 700117 HCHG RX CONTRAST REV CODE 255: Performed by: NURSE PRACTITIONER

## 2017-12-15 PROCEDURE — A9579 GAD-BASE MR CONTRAST NOS,1ML: HCPCS | Performed by: NURSE PRACTITIONER

## 2017-12-15 PROCEDURE — 70543 MRI ORBT/FAC/NCK W/O &W/DYE: CPT

## 2017-12-15 PROCEDURE — 72148 MRI LUMBAR SPINE W/O DYE: CPT

## 2017-12-15 RX ADMIN — GADODIAMIDE 20 ML: 287 INJECTION INTRAVENOUS at 13:24

## 2018-01-12 ENCOUNTER — HOSPITAL ENCOUNTER (OUTPATIENT)
Dept: LAB | Facility: MEDICAL CENTER | Age: 28
End: 2018-01-12
Attending: OBSTETRICS & GYNECOLOGY
Payer: COMMERCIAL

## 2018-01-12 PROCEDURE — 88175 CYTOPATH C/V AUTO FLUID REDO: CPT

## 2018-01-16 LAB — CYTOLOGY REG CYTOL: NORMAL

## 2018-09-07 ENCOUNTER — HOSPITAL ENCOUNTER (OUTPATIENT)
Dept: RADIOLOGY | Facility: MEDICAL CENTER | Age: 28
End: 2018-09-07
Attending: PHYSICIAN ASSISTANT
Payer: COMMERCIAL

## 2018-09-07 DIAGNOSIS — N92.0 EXCESSIVE OR FREQUENT MENSTRUATION: ICD-10-CM

## 2018-09-07 DIAGNOSIS — N93.8 DYSFUNCTIONAL UTERINE BLEEDING: ICD-10-CM

## 2018-09-07 PROCEDURE — 76830 TRANSVAGINAL US NON-OB: CPT

## 2018-10-19 ENCOUNTER — HOSPITAL ENCOUNTER (OUTPATIENT)
Dept: RADIOLOGY | Facility: MEDICAL CENTER | Age: 28
End: 2018-10-19
Attending: COLON & RECTAL SURGERY
Payer: COMMERCIAL

## 2018-10-19 DIAGNOSIS — K76.0 NAFL (NONALCOHOLIC FATTY LIVER): ICD-10-CM

## 2018-10-19 DIAGNOSIS — M19.90 OSTEOARTHRITIS, UNSPECIFIED OSTEOARTHRITIS TYPE, UNSPECIFIED SITE: ICD-10-CM

## 2018-10-19 PROCEDURE — 74241 DX-UPPER GI-SERIES WITH KUB: CPT

## 2018-10-28 ENCOUNTER — HOSPITAL ENCOUNTER (OUTPATIENT)
Dept: RADIOLOGY | Facility: MEDICAL CENTER | Age: 28
End: 2018-10-28
Attending: PHYSICIAN ASSISTANT
Payer: COMMERCIAL

## 2018-10-28 DIAGNOSIS — N83.9 NONINFLAMMATORY DISORDER OF OVARY: ICD-10-CM

## 2018-10-28 PROCEDURE — 76830 TRANSVAGINAL US NON-OB: CPT

## 2019-01-02 ENCOUNTER — OFFICE VISIT (OUTPATIENT)
Dept: URGENT CARE | Facility: PHYSICIAN GROUP | Age: 29
End: 2019-01-02
Payer: COMMERCIAL

## 2019-01-02 VITALS
BODY MASS INDEX: 30.22 KG/M2 | OXYGEN SATURATION: 100 % | HEART RATE: 77 BPM | DIASTOLIC BLOOD PRESSURE: 70 MMHG | SYSTOLIC BLOOD PRESSURE: 120 MMHG | WEIGHT: 188 LBS | RESPIRATION RATE: 16 BRPM | HEIGHT: 66 IN | TEMPERATURE: 97.9 F

## 2019-01-02 DIAGNOSIS — R51.9 NONINTRACTABLE HEADACHE, UNSPECIFIED CHRONICITY PATTERN, UNSPECIFIED HEADACHE TYPE: ICD-10-CM
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
## 2019-01-02 PROCEDURE — 99214 OFFICE O/P EST MOD 30 MIN: CPT | Performed by: PHYSICIAN ASSISTANT

## 2019-01-02 RX ORDER — PAROXETINE HYDROCHLORIDE 20 MG/1
20 TABLET, FILM COATED ORAL DAILY
COMMUNITY
End: 2019-10-17

## 2019-01-02 ASSESSMENT — PAIN SCALES - GENERAL: PAINLEVEL: 2=MINIMAL-SLIGHT

## 2019-01-04 ASSESSMENT — ENCOUNTER SYMPTOMS
BLURRED VISION: 1
PHOTOPHOBIA: 0
EYE REDNESS: 0
VISUAL CHANGE: 1
NECK PAIN: 0
NAUSEA: 0
EYE DISCHARGE: 0
FATIGUE: 0
CHILLS: 0
SENSORY CHANGE: 0
DIARRHEA: 0
EYE PAIN: 0
DIZZINESS: 0
FEVER: 0
HEADACHES: 1
VOMITING: 0
TINGLING: 0
FALLS: 0
SORE THROAT: 0
ABDOMINAL PAIN: 0
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
## 2019-03-07 ENCOUNTER — HOSPITAL ENCOUNTER (OUTPATIENT)
Dept: RADIOLOGY | Facility: MEDICAL CENTER | Age: 29
End: 2019-03-07
Attending: NURSE PRACTITIONER
Payer: COMMERCIAL

## 2019-03-07 DIAGNOSIS — M47.12 MYELOPATHY, SPONDYLOGENIC, CERVICAL: ICD-10-CM

## 2019-03-07 PROCEDURE — 72141 MRI NECK SPINE W/O DYE: CPT

## 2019-03-07 PROCEDURE — 70551 MRI BRAIN STEM W/O DYE: CPT

## 2019-03-28 ENCOUNTER — HOSPITAL ENCOUNTER (OUTPATIENT)
Dept: LAB | Facility: MEDICAL CENTER | Age: 29
End: 2019-03-28
Attending: PHYSICIAN ASSISTANT
Payer: COMMERCIAL

## 2019-03-28 PROCEDURE — 87624 HPV HI-RISK TYP POOLED RSLT: CPT

## 2019-03-28 PROCEDURE — 88175 CYTOPATH C/V AUTO FLUID REDO: CPT

## 2019-03-29 LAB
CYTOLOGY REG CYTOL: NORMAL
HPV HR 12 DNA CVX QL NAA+PROBE: NEGATIVE
HPV16 DNA SPEC QL NAA+PROBE: NEGATIVE
HPV18 DNA SPEC QL NAA+PROBE: NEGATIVE
SPECIMEN SOURCE: NORMAL

## 2019-04-02 ENCOUNTER — HOSPITAL ENCOUNTER (OUTPATIENT)
Dept: LAB | Facility: MEDICAL CENTER | Age: 29
End: 2019-04-02
Attending: PHYSICIAN ASSISTANT
Payer: COMMERCIAL

## 2019-04-02 LAB
25(OH)D3 SERPL-MCNC: 19 NG/ML (ref 30–100)
ALBUMIN SERPL BCP-MCNC: 4.1 G/DL (ref 3.2–4.9)
ALBUMIN/GLOB SERPL: 1.7 G/DL
ALP SERPL-CCNC: 50 U/L (ref 30–99)
ALT SERPL-CCNC: 9 U/L (ref 2–50)
ANION GAP SERPL CALC-SCNC: 7 MMOL/L (ref 0–11.9)
AST SERPL-CCNC: 14 U/L (ref 12–45)
BASOPHILS # BLD AUTO: 0.4 % (ref 0–1.8)
BASOPHILS # BLD: 0.02 K/UL (ref 0–0.12)
BILIRUB SERPL-MCNC: 0.6 MG/DL (ref 0.1–1.5)
BUN SERPL-MCNC: 26 MG/DL (ref 8–22)
CALCIUM SERPL-MCNC: 9.2 MG/DL (ref 8.5–10.5)
CHLORIDE SERPL-SCNC: 105 MMOL/L (ref 96–112)
CHOLEST SERPL-MCNC: 158 MG/DL (ref 100–199)
CO2 SERPL-SCNC: 27 MMOL/L (ref 20–33)
CREAT SERPL-MCNC: 0.8 MG/DL (ref 0.5–1.4)
EOSINOPHIL # BLD AUTO: 0.15 K/UL (ref 0–0.51)
EOSINOPHIL NFR BLD: 2.8 % (ref 0–6.9)
ERYTHROCYTE [DISTWIDTH] IN BLOOD BY AUTOMATED COUNT: 41.1 FL (ref 35.9–50)
FASTING STATUS PATIENT QL REPORTED: NORMAL
FOLATE SERPL-MCNC: 7.8 NG/ML
GLOBULIN SER CALC-MCNC: 2.4 G/DL (ref 1.9–3.5)
GLUCOSE SERPL-MCNC: 91 MG/DL (ref 65–99)
HCT VFR BLD AUTO: 38.6 % (ref 37–47)
HDLC SERPL-MCNC: 66 MG/DL
HGB BLD-MCNC: 12.8 G/DL (ref 12–16)
IMM GRANULOCYTES # BLD AUTO: 0.01 K/UL (ref 0–0.11)
IMM GRANULOCYTES NFR BLD AUTO: 0.2 % (ref 0–0.9)
IRON SATN MFR SERPL: 37 % (ref 15–55)
IRON SERPL-MCNC: 118 UG/DL (ref 40–170)
LDLC SERPL CALC-MCNC: 82 MG/DL
LYMPHOCYTES # BLD AUTO: 2.06 K/UL (ref 1–4.8)
LYMPHOCYTES NFR BLD: 38.6 % (ref 22–41)
MCH RBC QN AUTO: 30.2 PG (ref 27–33)
MCHC RBC AUTO-ENTMCNC: 33.2 G/DL (ref 33.6–35)
MCV RBC AUTO: 91 FL (ref 81.4–97.8)
MONOCYTES # BLD AUTO: 0.34 K/UL (ref 0–0.85)
MONOCYTES NFR BLD AUTO: 6.4 % (ref 0–13.4)
NEUTROPHILS # BLD AUTO: 2.75 K/UL (ref 2–7.15)
NEUTROPHILS NFR BLD: 51.6 % (ref 44–72)
NRBC # BLD AUTO: 0 K/UL
NRBC BLD-RTO: 0 /100 WBC
PLATELET # BLD AUTO: 261 K/UL (ref 164–446)
PMV BLD AUTO: 11.1 FL (ref 9–12.9)
POTASSIUM SERPL-SCNC: 3.8 MMOL/L (ref 3.6–5.5)
PREALB SERPL-MCNC: 22 MG/DL (ref 18–38)
PROT SERPL-MCNC: 6.5 G/DL (ref 6–8.2)
RBC # BLD AUTO: 4.24 M/UL (ref 4.2–5.4)
SODIUM SERPL-SCNC: 139 MMOL/L (ref 135–145)
TIBC SERPL-MCNC: 315 UG/DL (ref 250–450)
TRANSFERRIN SERPL-MCNC: 222 MG/DL (ref 200–370)
TRIGL SERPL-MCNC: 51 MG/DL (ref 0–149)
VIT B12 SERPL-MCNC: 196 PG/ML (ref 211–911)
WBC # BLD AUTO: 5.3 K/UL (ref 4.8–10.8)

## 2019-04-02 PROCEDURE — 82607 VITAMIN B-12: CPT

## 2019-04-02 PROCEDURE — 82306 VITAMIN D 25 HYDROXY: CPT

## 2019-04-02 PROCEDURE — 83540 ASSAY OF IRON: CPT

## 2019-04-02 PROCEDURE — 85025 COMPLETE CBC W/AUTO DIFF WBC: CPT

## 2019-04-02 PROCEDURE — 84134 ASSAY OF PREALBUMIN: CPT

## 2019-04-02 PROCEDURE — 80061 LIPID PANEL: CPT

## 2019-04-02 PROCEDURE — 80053 COMPREHEN METABOLIC PANEL: CPT

## 2019-04-02 PROCEDURE — 82746 ASSAY OF FOLIC ACID SERUM: CPT

## 2019-04-02 PROCEDURE — 36415 COLL VENOUS BLD VENIPUNCTURE: CPT

## 2019-04-02 PROCEDURE — 83550 IRON BINDING TEST: CPT

## 2019-04-02 PROCEDURE — 84466 ASSAY OF TRANSFERRIN: CPT

## 2019-04-02 PROCEDURE — 84425 ASSAY OF VITAMIN B-1: CPT

## 2019-04-07 LAB — VIT B1 BLD-MCNC: 105 NMOL/L (ref 70–180)

## 2019-05-06 ENCOUNTER — OFFICE VISIT (OUTPATIENT)
Dept: URGENT CARE | Facility: CLINIC | Age: 29
End: 2019-05-06
Payer: COMMERCIAL

## 2019-05-06 ENCOUNTER — HOSPITAL ENCOUNTER (OUTPATIENT)
Facility: MEDICAL CENTER | Age: 29
End: 2019-05-06
Attending: NURSE PRACTITIONER
Payer: COMMERCIAL

## 2019-05-06 VITALS
TEMPERATURE: 98.2 F | WEIGHT: 197 LBS | HEIGHT: 66 IN | DIASTOLIC BLOOD PRESSURE: 68 MMHG | HEART RATE: 62 BPM | OXYGEN SATURATION: 98 % | BODY MASS INDEX: 31.66 KG/M2 | SYSTOLIC BLOOD PRESSURE: 108 MMHG | RESPIRATION RATE: 14 BRPM

## 2019-05-06 DIAGNOSIS — R30.0 DYSURIA: ICD-10-CM

## 2019-05-06 LAB
APPEARANCE UR: NORMAL
BILIRUB UR STRIP-MCNC: NORMAL MG/DL
COLOR UR AUTO: YELLOW
GLUCOSE UR STRIP.AUTO-MCNC: NORMAL MG/DL
KETONES UR STRIP.AUTO-MCNC: NORMAL MG/DL
LEUKOCYTE ESTERASE UR QL STRIP.AUTO: NORMAL
NITRITE UR QL STRIP.AUTO: NORMAL
PH UR STRIP.AUTO: 6 [PH] (ref 5–8)
PROT UR QL STRIP: NORMAL MG/DL
RBC UR QL AUTO: NORMAL
SP GR UR STRIP.AUTO: 1.02
UROBILINOGEN UR STRIP-MCNC: 10 MG/DL

## 2019-05-06 PROCEDURE — 81002 URINALYSIS NONAUTO W/O SCOPE: CPT | Performed by: NURSE PRACTITIONER

## 2019-05-06 PROCEDURE — 87086 URINE CULTURE/COLONY COUNT: CPT

## 2019-05-06 PROCEDURE — 99214 OFFICE O/P EST MOD 30 MIN: CPT | Performed by: NURSE PRACTITIONER

## 2019-05-06 PROCEDURE — 99000 SPECIMEN HANDLING OFFICE-LAB: CPT | Performed by: NURSE PRACTITIONER

## 2019-05-06 RX ORDER — PHENAZOPYRIDINE HYDROCHLORIDE 200 MG/1
200 TABLET, FILM COATED ORAL 3 TIMES DAILY PRN
Qty: 6 TAB | Refills: 0 | Status: SHIPPED | OUTPATIENT
Start: 2019-05-06 | End: 2019-08-06

## 2019-05-06 ASSESSMENT — PATIENT HEALTH QUESTIONNAIRE - PHQ9
CLINICAL INTERPRETATION OF PHQ2 SCORE: 2
5. POOR APPETITE OR OVEREATING: 1 - SEVERAL DAYS
SUM OF ALL RESPONSES TO PHQ QUESTIONS 1-9: 9

## 2019-05-06 ASSESSMENT — ENCOUNTER SYMPTOMS
NAUSEA: 0
SWEATS: 0
VOMITING: 0
FLANK PAIN: 0
CHILLS: 0

## 2019-05-07 NOTE — PROGRESS NOTES
"Subjective:      Jaqui Kumar is a 29 y.o. female who presents with UTI    Denies past medical, surgical or family history that is significant to today's problem.   RX or OTC medications-- reviewed with patient today.   No Known Allergies          Dysuria    This is a new problem. The current episode started in the past 7 days. The problem occurs every urination. The problem has been gradually worsening. The quality of the pain is described as aching and burning. The pain is at a severity of 4/10. The pain is mild. There has been no fever. There is no history of pyelonephritis. Associated symptoms include hesitancy and urgency. Pertinent negatives include no chills, discharge, flank pain, frequency, hematuria, nausea, sweats or vomiting. She has tried acetaminophen for the symptoms. The treatment provided mild relief. Her past medical history is significant for urinary stasis (ill family member - she has not been drinking as much water as normal. ). There is no history of recurrent UTIs.       Review of Systems   Constitutional: Negative for chills.   Gastrointestinal: Negative for nausea and vomiting.   Genitourinary: Positive for dysuria, hesitancy and urgency. Negative for flank pain, frequency and hematuria.          Objective:     /68 (BP Location: Right arm, Patient Position: Sitting, BP Cuff Size: Large adult)   Pulse 62   Temp 36.8 °C (98.2 °F) (Temporal)   Resp 14   Ht 1.676 m (5' 6\")   Wt 89.4 kg (197 lb)   SpO2 98%   BMI 31.80 kg/m²      Physical Exam   Constitutional: Vital signs are normal. She appears well-developed and well-nourished. She is cooperative.  Non-toxic appearance. She does not appear ill. No distress.   HENT:   Head: Normocephalic.   Cardiovascular: Normal rate and regular rhythm.    Pulmonary/Chest: Effort normal and breath sounds normal.   Abdominal: Soft. Normal appearance. She exhibits no distension. There is no tenderness. There is no rigidity, no rebound, no " guarding and no CVA tenderness.   Musculoskeletal:        Lumbar back: Normal.   Neurological: She is alert.   Skin: Skin is warm and dry. She is not diaphoretic.   Nursing note and vitals reviewed.          Results for orders placed or performed in visit on 05/06/19   POCT Urinalysis   Result Value Ref Range    POC Color yellow Negative    POC Appearance cloudy Negative    POC Leukocyte Esterase neg Negative    POC Nitrites neg Negative    POC Urobiligen 10 Negative (0.2) mg/dL    POC Protein neg Negative mg/dL    POC Urine PH 6.0 5.0 - 8.0    POC Blood neg Negative    POC Specific Gravity 1.025 <1.005 - >1.030    POC Ketones neg Negative mg/dL    POC Bilirubin neg Negative mg/dL    POC Glucose neg Negative mg/dL            Assessment/Plan:     1. Dysuria    - POCT Urinalysis  - Urine Culture; Future  - phenazopyridine (PYRIDIUM) 200 MG Tab; Take 1 Tab by mouth 3 times a day as needed.  Dispense: 6 Tab; Refill: 0      Educated in proper administration of medication(s) ordered today including safety, possible SE, risks, benefits, rationale and alternatives to therapy.     Return to clinic or PCP prn  if current symptoms are not resolving in a satisfactory manner or sooner if new or worsening symptoms occur.   Patient was advised of signs and symptoms which would warrant further evaluation.    Verbalized agreement with this treatment plan and seemed to understand without barriers. Questions were encouraged and answered to patients satisfaction.     Aftercare instructions were given to pt

## 2019-05-09 LAB
BACTERIA UR CULT: NORMAL
SIGNIFICANT IND 70042: NORMAL
SITE SITE: NORMAL
SOURCE SOURCE: NORMAL

## 2019-07-26 ENCOUNTER — HOSPITAL ENCOUNTER (OUTPATIENT)
Dept: LAB | Facility: MEDICAL CENTER | Age: 29
End: 2019-07-26
Attending: NURSE PRACTITIONER
Payer: COMMERCIAL

## 2019-07-26 LAB
25(OH)D3 SERPL-MCNC: 35 NG/ML (ref 30–100)
ALBUMIN SERPL BCP-MCNC: 4.1 G/DL (ref 3.2–4.9)
ALBUMIN/GLOB SERPL: 1.4 G/DL
ALP SERPL-CCNC: 49 U/L (ref 30–99)
ALT SERPL-CCNC: 13 U/L (ref 2–50)
ANION GAP SERPL CALC-SCNC: 6 MMOL/L (ref 0–11.9)
AST SERPL-CCNC: 17 U/L (ref 12–45)
BASOPHILS # BLD AUTO: 0.7 % (ref 0–1.8)
BASOPHILS # BLD: 0.04 K/UL (ref 0–0.12)
BILIRUB SERPL-MCNC: 0.4 MG/DL (ref 0.1–1.5)
BUN SERPL-MCNC: 24 MG/DL (ref 8–22)
CALCIUM SERPL-MCNC: 8.9 MG/DL (ref 8.5–10.5)
CHLORIDE SERPL-SCNC: 106 MMOL/L (ref 96–112)
CHOLEST SERPL-MCNC: 160 MG/DL (ref 100–199)
CO2 SERPL-SCNC: 27 MMOL/L (ref 20–33)
CREAT SERPL-MCNC: 0.79 MG/DL (ref 0.5–1.4)
EOSINOPHIL # BLD AUTO: 0.1 K/UL (ref 0–0.51)
EOSINOPHIL NFR BLD: 1.8 % (ref 0–6.9)
ERYTHROCYTE [DISTWIDTH] IN BLOOD BY AUTOMATED COUNT: 45.3 FL (ref 35.9–50)
FASTING STATUS PATIENT QL REPORTED: NORMAL
FOLATE SERPL-MCNC: 4.5 NG/ML
GLOBULIN SER CALC-MCNC: 2.9 G/DL (ref 1.9–3.5)
GLUCOSE SERPL-MCNC: 80 MG/DL (ref 65–99)
HCT VFR BLD AUTO: 38.9 % (ref 37–47)
HDLC SERPL-MCNC: 69 MG/DL
HGB BLD-MCNC: 12.4 G/DL (ref 12–16)
IMM GRANULOCYTES # BLD AUTO: 0.01 K/UL (ref 0–0.11)
IMM GRANULOCYTES NFR BLD AUTO: 0.2 % (ref 0–0.9)
IRON SATN MFR SERPL: 32 % (ref 15–55)
IRON SERPL-MCNC: 103 UG/DL (ref 40–170)
LDLC SERPL CALC-MCNC: 79 MG/DL
LYMPHOCYTES # BLD AUTO: 2.17 K/UL (ref 1–4.8)
LYMPHOCYTES NFR BLD: 39.5 % (ref 22–41)
MCH RBC QN AUTO: 29.6 PG (ref 27–33)
MCHC RBC AUTO-ENTMCNC: 31.9 G/DL (ref 33.6–35)
MCV RBC AUTO: 92.8 FL (ref 81.4–97.8)
MONOCYTES # BLD AUTO: 0.33 K/UL (ref 0–0.85)
MONOCYTES NFR BLD AUTO: 6 % (ref 0–13.4)
NEUTROPHILS # BLD AUTO: 2.84 K/UL (ref 2–7.15)
NEUTROPHILS NFR BLD: 51.8 % (ref 44–72)
NRBC # BLD AUTO: 0 K/UL
NRBC BLD-RTO: 0 /100 WBC
PLATELET # BLD AUTO: 284 K/UL (ref 164–446)
PMV BLD AUTO: 10.6 FL (ref 9–12.9)
POTASSIUM SERPL-SCNC: 4.2 MMOL/L (ref 3.6–5.5)
PREALB SERPL-MCNC: 22 MG/DL (ref 18–38)
PROT SERPL-MCNC: 7 G/DL (ref 6–8.2)
RBC # BLD AUTO: 4.19 M/UL (ref 4.2–5.4)
SODIUM SERPL-SCNC: 139 MMOL/L (ref 135–145)
TIBC SERPL-MCNC: 325 UG/DL (ref 250–450)
TRANSFERRIN SERPL-MCNC: 224 MG/DL (ref 200–370)
TRIGL SERPL-MCNC: 58 MG/DL (ref 0–149)
VIT B12 SERPL-MCNC: 209 PG/ML (ref 211–911)
WBC # BLD AUTO: 5.5 K/UL (ref 4.8–10.8)

## 2019-07-26 PROCEDURE — 84252 ASSAY OF VITAMIN B-2: CPT

## 2019-07-26 PROCEDURE — 80061 LIPID PANEL: CPT

## 2019-07-26 PROCEDURE — 36415 COLL VENOUS BLD VENIPUNCTURE: CPT

## 2019-07-26 PROCEDURE — 83550 IRON BINDING TEST: CPT

## 2019-07-26 PROCEDURE — 84466 ASSAY OF TRANSFERRIN: CPT

## 2019-07-26 PROCEDURE — 84134 ASSAY OF PREALBUMIN: CPT

## 2019-07-26 PROCEDURE — 84425 ASSAY OF VITAMIN B-1: CPT

## 2019-07-26 PROCEDURE — 83540 ASSAY OF IRON: CPT

## 2019-07-26 PROCEDURE — 82607 VITAMIN B-12: CPT

## 2019-07-26 PROCEDURE — 84207 ASSAY OF VITAMIN B-6: CPT

## 2019-07-26 PROCEDURE — 80053 COMPREHEN METABOLIC PANEL: CPT

## 2019-07-26 PROCEDURE — 82746 ASSAY OF FOLIC ACID SERUM: CPT

## 2019-07-26 PROCEDURE — 82306 VITAMIN D 25 HYDROXY: CPT

## 2019-07-26 PROCEDURE — 85025 COMPLETE CBC W/AUTO DIFF WBC: CPT

## 2019-07-28 LAB — VIT B6 SERPL-MCNC: 27.2 NMOL/L (ref 20–125)

## 2019-07-30 LAB
VIT B1 BLD-MCNC: 120 NMOL/L (ref 70–180)
VIT B2 SERPL-SCNC: 9 NMOL/L (ref 5–50)

## 2019-08-06 ENCOUNTER — OFFICE VISIT (OUTPATIENT)
Dept: NEUROLOGY | Facility: MEDICAL CENTER | Age: 29
End: 2019-08-06
Payer: COMMERCIAL

## 2019-08-06 VITALS
OXYGEN SATURATION: 100 % | TEMPERATURE: 97.4 F | WEIGHT: 202.4 LBS | HEART RATE: 68 BPM | DIASTOLIC BLOOD PRESSURE: 66 MMHG | HEIGHT: 66 IN | BODY MASS INDEX: 32.53 KG/M2 | SYSTOLIC BLOOD PRESSURE: 110 MMHG | RESPIRATION RATE: 15 BRPM

## 2019-08-06 DIAGNOSIS — M54.16 BILATERAL LUMBAR RADICULOPATHY: Primary | ICD-10-CM

## 2019-08-06 PROCEDURE — 99205 OFFICE O/P NEW HI 60 MIN: CPT | Performed by: PSYCHIATRY & NEUROLOGY

## 2019-08-06 RX ORDER — NORTRIPTYLINE HYDROCHLORIDE 25 MG/1
CAPSULE ORAL
Qty: 90 CAP | Refills: 2 | Status: SHIPPED | OUTPATIENT
Start: 2019-08-06 | End: 2019-08-20

## 2019-08-06 RX ORDER — CELECOXIB 100 MG/1
CAPSULE ORAL
Refills: 1 | COMMUNITY
Start: 2019-07-05 | End: 2019-08-06 | Stop reason: SDUPTHER

## 2019-08-06 RX ORDER — CELECOXIB 100 MG/1
200 CAPSULE ORAL DAILY
Qty: 60 CAP | Refills: 1 | Status: SHIPPED | OUTPATIENT
Start: 2019-08-06 | End: 2020-04-03

## 2019-08-06 RX ORDER — METHYLPREDNISOLONE 4 MG/1
TABLET ORAL
Qty: 1 KIT | Refills: 0 | Status: SHIPPED | OUTPATIENT
Start: 2019-08-06 | End: 2019-08-20

## 2019-08-06 RX ORDER — NORTRIPTYLINE HYDROCHLORIDE 10 MG/1
CAPSULE ORAL
Refills: 0 | COMMUNITY
Start: 2019-07-25 | End: 2019-08-06 | Stop reason: SDUPTHER

## 2019-08-06 RX ORDER — ERGOCALCIFEROL 1.25 MG/1
50000 CAPSULE ORAL
Refills: 0 | COMMUNITY
Start: 2019-07-05 | End: 2021-02-22

## 2019-08-06 SDOH — HEALTH STABILITY: MENTAL HEALTH: HOW MANY STANDARD DRINKS CONTAINING ALCOHOL DO YOU HAVE ON A TYPICAL DAY?: 1 OR 2

## 2019-08-06 SDOH — HEALTH STABILITY: MENTAL HEALTH: HOW OFTEN DO YOU HAVE A DRINK CONTAINING ALCOHOL?: MONTHLY OR LESS

## 2019-08-06 SDOH — HEALTH STABILITY: MENTAL HEALTH: HOW OFTEN DO YOU HAVE 6 OR MORE DRINKS ON ONE OCCASION?: NEVER

## 2019-08-06 ASSESSMENT — ENCOUNTER SYMPTOMS
WEIGHT LOSS: 0
FOCAL WEAKNESS: 0
TINGLING: 1
SENSORY CHANGE: 1
MEMORY LOSS: 0
BACK PAIN: 1
LOSS OF CONSCIOUSNESS: 0
FALLS: 0

## 2019-08-06 ASSESSMENT — PAIN SCALES - GENERAL: PAINLEVEL: 7=MODERATE-SEVERE PAIN

## 2019-08-07 NOTE — PROGRESS NOTES
Subjective:      Jaqui Kumar is a 29 y.o. female who presents from the office of RILEY Persaud, for consultation, with a history of chronic low back pain but persistent right greater than left lower extremity dysesthesias consistent with radiculopathy.    HPI    Jaqui is a very pleasant 29-year-old right-handed  female who has been dealing with chronic low back pain and lower extremity sciatica since about 2010.  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    She describes chronic back pain with right greater than left lower extremity radiating symptoms, she describes tingling in an S1 distribution bilaterally.  There is numbness and tingling sensations.  Initially the radiation would occur with sustained physical activity or walking, certain positions, etc.  With the pain came some subjective weakness only.  Nothing was ever permanent though the intensity and recurrence would limit her ability to function.    She underwent epidural steroid injections earlier this year, initially on the right, then central, and once more on the right in February, the first 2 providing some temporary benefit, but since the third, she has had a sudden and persistent pain and sensory distortion in an S1 distribution down the right leg.  This is more severe than she has ever suffered from.  Now the symptoms radiate with simple Valsalva, walking shorter distances elicit symptoms, she has had to give up horseback riding completely.  The left leg can be involved.  There can be some right-sided perigenital numbness sensations, there is been no change in bowel or bladder function.  She denies constrictive sensations around the lower abdomen.  There is still no weakness. The only comfortable position she now has is laying in left lateral decubitus position or when supine, with her  "hips and knees flexed.    She was just placed on Celebrex 100 mg daily with no benefit, nortriptyline 10 mg nightly also recently started, never increased in dose, no benefit.  She was placed on Neurontin but became psychopathic!    Her last MRI imaging study from  was reviewed, revealing multiple level degenerative changes only.  She did undergo EMG/NCV studies earlier this year, they were done in all 4 extremities, and she remembers being told that these were unremarkable.    Other than some mild anxiety, there is no history of diabetes, CVA, malignancy, thyroid disease, autoimmune disease, psychiatric disease, migraine, MS, seizure, PVD, CAD, liver or kidney disease, autoimmune disease, hypertension, blood dyscrasia, or pulmonary disease.  There is no surgical history of note from my standpoint.    Her mother  from endometrial carcinoma, her father and both her siblings are alive and well, she is not aware of any of these individuals suffering from neuropathy or other neurologic disorder.    She does not smoke or drink.  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  She is on Prilosec, Paxil 20 mg daily, Celebrex 100 mg daily and Pamelor 10 mg nightly.    Review of Systems   Constitutional: Negative for malaise/fatigue and weight loss.   Musculoskeletal: Positive for back pain. Negative for falls.   Neurological: Positive for tingling and sensory change. Negative for focal weakness and loss of consciousness.   Psychiatric/Behavioral: Negative for memory loss.   All other systems reviewed and are negative.       Objective:     /66 (BP Location: Right arm, Patient Position: Sitting)   Pulse 68   Temp 36.3 °C (97.4 °F)   Resp 15   Ht 1.676 m (5' 6\")   Wt 91.8 kg (202 lb 6.4 oz)   SpO2 100%   BMI 32.67 kg/m²      Physical Exam    She appears in some distress when it comes to her back discomfort, especially when she has to move for the examination.  She is cooperative.  Vital " signs are stable.  There is no malar rash or temporal tenderness.  The neck is supple, range of motion is full, Lhermitte's phenomena is absent.  Carotid pulses are present without asymmetry.  Cardiac evaluation reveals a regular rhythm.  Straight leg raising on the right does elicit some radiation of symptoms down the lateral thigh, there is only increase in back pain on the left, there are no crossed findings.  Distal pulses are intact.  There is tenderness over the midline at the lumbosacral joint.    Fully oriented, there is no evidence of aphasia or cognitive deficit.    PERRLA/EOMI, visual fields are full, facial movements are symmetric and the tongue and uvula are midline without bulbar dysfunction.  Sensory exam is intact to temperature bilaterally, jaw jerk is absent, shoulder shrug and head rotation are intact and symmetric.    Musculoskeletal exam reveals normal tone throughout, there is no tremor, asterixis, or drift.  There is no atrophy in the lower extremities.  Strength is intact in all 4 extremities.  Reflexes are present throughout though the ankle jerks are diminished relatively, the right more so than left.  Both toes are downgoing.    Repetitive movements and fine motor control are intact and symmetric with normal amplitude and frequencies in all 4 extremities.  She stands slowly because of her back pain, she does not limp and there is no circumduction of either lower extremity.  Heel and toe walking can be done easily.  There is no appendicular dystaxia.    Sensory exam reveals intact JPS and vibratory sensation throughout.  There is a subjective decrement of pin and temperature over the lateral side of the right leg from the distal thigh down to the lateral foot.  All modalities are intact in the upper extremities.     Assessment/Plan:     1. Bilateral lumbar radiculopathy  Though the complaints are bilateral, it is the right leg that is clearly more symptomatic.  Imaging has not been done in  almost 2 years, this does need to be repeated, and even though EMG/NCV studies were reportedly normal, this is not unheard of in acute to subacute radiculopathy.  The clinical picture is consistent with proximal nerve compression, I doubt that this is a more diffuse autoimmune inflammatory neuropathy.  She has nothing to suggest spinal claudication, but given the nature of her work, advanced osteoarthritic changes with and without an HNP still are the most likely is causes.  Fortunately, her examination is fairly benign, though she does show sensory deficits, these are more likely to respond to appropriate treatments.    The exam is consistent with persistent inflammation with tenderness over the lumbar spine as well as chronic pain.  A steroid Dosepak followed by a higher dose of Celebrex is appropriate, Pamelor should be increased in dose to achieve a more sustained pain relief.  She is already getting physical therapy though given the severity of her pain, its application will be limited.    Face-to-face time was spent reviewing all of the above.  The rationale for my treatment and diagnostic recommendations was reviewed in full.  MRI of the lumbar spine will be obtained, we will call her with results and if abnormal, she may require neurosurgical consultation.  In the meantime a Medrol Dosepak will be started, on the last day, Celebrex 200 mg daily will be started.  Pamelor 25 mg nightly will be started and increased weekly by 25 mg increments up to a minimum of 75 mg nightly.  Side effects of all of this were reviewed.  We will follow-up into the future otherwise, phone contact to adjust medications, etc.    - MR-LUMBAR SPINE-W/O; Future  - methylPREDNISolone (MEDROL DOSEPAK) 4 MG Tablet Therapy Pack; As directed in the kit, 6 tab qAM on day 1, reduce by 1 tab daily until off  Dispense: 1 Kit; Refill: 0  - nortriptyline (PAMELOR) 25 MG Cap; 1 tab qhs for 1 week, increase by 1 tab every week to 3 tab qhs   Dispense: 90 Cap; Refill: 2  - celecoxib (CELEBREX) 100 MG Cap; Take 2 Caps by mouth every day.  Dispense: 60 Cap; Refill: 1

## 2019-08-19 ENCOUNTER — HOSPITAL ENCOUNTER (OUTPATIENT)
Dept: RADIOLOGY | Facility: MEDICAL CENTER | Age: 29
End: 2019-08-19
Attending: PSYCHIATRY & NEUROLOGY
Payer: COMMERCIAL

## 2019-08-19 DIAGNOSIS — M54.16 BILATERAL LUMBAR RADICULOPATHY: ICD-10-CM

## 2019-08-19 PROCEDURE — 72148 MRI LUMBAR SPINE W/O DYE: CPT

## 2019-08-20 ENCOUNTER — HOSPITAL ENCOUNTER (EMERGENCY)
Facility: MEDICAL CENTER | Age: 29
End: 2019-08-20
Attending: EMERGENCY MEDICINE
Payer: COMMERCIAL

## 2019-08-20 ENCOUNTER — APPOINTMENT (OUTPATIENT)
Dept: RADIOLOGY | Facility: MEDICAL CENTER | Age: 29
End: 2019-08-20
Attending: EMERGENCY MEDICINE
Payer: COMMERCIAL

## 2019-08-20 ENCOUNTER — APPOINTMENT (OUTPATIENT)
Dept: URGENT CARE | Facility: MEDICAL CENTER | Age: 29
End: 2019-08-20
Payer: COMMERCIAL

## 2019-08-20 VITALS
BODY MASS INDEX: 33.06 KG/M2 | OXYGEN SATURATION: 96 % | WEIGHT: 205.69 LBS | HEIGHT: 66 IN | HEART RATE: 69 BPM | RESPIRATION RATE: 27 BRPM | DIASTOLIC BLOOD PRESSURE: 85 MMHG | TEMPERATURE: 98.5 F | SYSTOLIC BLOOD PRESSURE: 136 MMHG

## 2019-08-20 DIAGNOSIS — K80.50 BILIARY COLIC: ICD-10-CM

## 2019-08-20 DIAGNOSIS — R10.10 PAIN OF UPPER ABDOMEN: ICD-10-CM

## 2019-08-20 LAB
ALBUMIN SERPL BCP-MCNC: 3.8 G/DL (ref 3.2–4.9)
ALBUMIN/GLOB SERPL: 1.4 G/DL
ALP SERPL-CCNC: 59 U/L (ref 30–99)
ALT SERPL-CCNC: 9 U/L (ref 2–50)
ANION GAP SERPL CALC-SCNC: 9 MMOL/L (ref 0–11.9)
AST SERPL-CCNC: 18 U/L (ref 12–45)
BASOPHILS # BLD AUTO: 0.6 % (ref 0–1.8)
BASOPHILS # BLD: 0.04 K/UL (ref 0–0.12)
BILIRUB SERPL-MCNC: 0.1 MG/DL (ref 0.1–1.5)
BUN SERPL-MCNC: 18 MG/DL (ref 8–22)
CALCIUM SERPL-MCNC: 8.7 MG/DL (ref 8.4–10.2)
CHLORIDE SERPL-SCNC: 106 MMOL/L (ref 96–112)
CO2 SERPL-SCNC: 23 MMOL/L (ref 20–33)
CREAT SERPL-MCNC: 0.63 MG/DL (ref 0.5–1.4)
EKG IMPRESSION: NORMAL
EOSINOPHIL # BLD AUTO: 0.2 K/UL (ref 0–0.51)
EOSINOPHIL NFR BLD: 2.8 % (ref 0–6.9)
ERYTHROCYTE [DISTWIDTH] IN BLOOD BY AUTOMATED COUNT: 40.1 FL (ref 35.9–50)
GLOBULIN SER CALC-MCNC: 2.7 G/DL (ref 1.9–3.5)
GLUCOSE SERPL-MCNC: 92 MG/DL (ref 65–99)
HCG SERPL QL: NEGATIVE
HCT VFR BLD AUTO: 35.3 % (ref 37–47)
HGB BLD-MCNC: 12.1 G/DL (ref 12–16)
IMM GRANULOCYTES # BLD AUTO: 0.02 K/UL (ref 0–0.11)
IMM GRANULOCYTES NFR BLD AUTO: 0.3 % (ref 0–0.9)
LIPASE SERPL-CCNC: 13 U/L (ref 7–58)
LYMPHOCYTES # BLD AUTO: 2.49 K/UL (ref 1–4.8)
LYMPHOCYTES NFR BLD: 34.9 % (ref 22–41)
MCH RBC QN AUTO: 30.3 PG (ref 27–33)
MCHC RBC AUTO-ENTMCNC: 34.3 G/DL (ref 33.6–35)
MCV RBC AUTO: 88.3 FL (ref 81.4–97.8)
MONOCYTES # BLD AUTO: 0.4 K/UL (ref 0–0.85)
MONOCYTES NFR BLD AUTO: 5.6 % (ref 0–13.4)
NEUTROPHILS # BLD AUTO: 3.99 K/UL (ref 2–7.15)
NEUTROPHILS NFR BLD: 55.8 % (ref 44–72)
NRBC # BLD AUTO: 0 K/UL
NRBC BLD-RTO: 0 /100 WBC
PLATELET # BLD AUTO: 242 K/UL (ref 164–446)
PMV BLD AUTO: 10.1 FL (ref 9–12.9)
POTASSIUM SERPL-SCNC: 4.1 MMOL/L (ref 3.6–5.5)
PROT SERPL-MCNC: 6.5 G/DL (ref 6–8.2)
RBC # BLD AUTO: 4 M/UL (ref 4.2–5.4)
SODIUM SERPL-SCNC: 138 MMOL/L (ref 135–145)
TROPONIN T SERPL-MCNC: <6 NG/L (ref 6–19)
WBC # BLD AUTO: 7.1 K/UL (ref 4.8–10.8)

## 2019-08-20 PROCEDURE — 85025 COMPLETE CBC W/AUTO DIFF WBC: CPT

## 2019-08-20 PROCEDURE — 84703 CHORIONIC GONADOTROPIN ASSAY: CPT

## 2019-08-20 PROCEDURE — 93005 ELECTROCARDIOGRAM TRACING: CPT | Performed by: EMERGENCY MEDICINE

## 2019-08-20 PROCEDURE — 83690 ASSAY OF LIPASE: CPT

## 2019-08-20 PROCEDURE — 93005 ELECTROCARDIOGRAM TRACING: CPT

## 2019-08-20 PROCEDURE — 71045 X-RAY EXAM CHEST 1 VIEW: CPT

## 2019-08-20 PROCEDURE — 76705 ECHO EXAM OF ABDOMEN: CPT

## 2019-08-20 PROCEDURE — 99285 EMERGENCY DEPT VISIT HI MDM: CPT

## 2019-08-20 PROCEDURE — 36415 COLL VENOUS BLD VENIPUNCTURE: CPT

## 2019-08-20 PROCEDURE — 80053 COMPREHEN METABOLIC PANEL: CPT

## 2019-08-20 PROCEDURE — 84484 ASSAY OF TROPONIN QUANT: CPT

## 2019-08-20 RX ORDER — ACETAMINOPHEN 500 MG
500-1000 TABLET ORAL EVERY 6 HOURS PRN
COMMUNITY

## 2019-08-20 RX ORDER — NORTRIPTYLINE HYDROCHLORIDE 25 MG/1
50 CAPSULE ORAL NIGHTLY
COMMUNITY
End: 2019-10-17

## 2019-08-20 RX ORDER — METHYLPREDNISOLONE 4 MG/1
4 TABLET ORAL DAILY
COMMUNITY
Start: 2019-08-11 | End: 2019-10-17

## 2019-08-20 NOTE — ED PROVIDER NOTES
"ED Provider Note    CHIEF COMPLAINT  Chief Complaint   Patient presents with   • Chest Pain     R side just below rib cage    • Abdominal Pain     RUQ area  started around 11:20  did eat and increased pain to R side        HPI  Jaqui Kumar is a 29 y.o. female who presents for evaluation of right sided chest discomfort right upper abdominal discomfort.  The patient reports that she was at work and developed right sided chest discomfort.  It was slightly worse with inspiration worse with movement.  She denies hemoptysis or leg swelling.  She also reports some anterior chest discomfort.  No nausea vomiting or diarrhea.  No right lower quadrant pain.  No associated fever or productive cough in the last few days.  No pain radiating to the back no exertional chest discomfort    REVIEW OF SYSTEMS  See HPI for further details.  No high fevers hemoptysis night sweats weight loss all other systems are negative.     PAST MEDICAL HISTORY  Past Medical History:   Diagnosis Date   • Pain 09/2017    back, knees, feet   • Anesthesia     \"Grandma coded in surgery, she was ok\"   • Arthritis     osteo-back and knees       FAMILY HISTORY  No history of early coronary artery disease or thromboembolic disease    SOCIAL HISTORY  Social History     Socioeconomic History   • Marital status: Single     Spouse name: Not on file   • Number of children: Not on file   • Years of education: Not on file   • Highest education level: Not on file   Occupational History   • Not on file   Social Needs   • Financial resource strain: Not on file   • Food insecurity:     Worry: Not on file     Inability: Not on file   • Transportation needs:     Medical: Not on file     Non-medical: Not on file   Tobacco Use   • Smoking status: Never Smoker   • Smokeless tobacco: Never Used   Substance and Sexual Activity   • Alcohol use: Yes     Frequency: Monthly or less     Drinks per session: 1 or 2     Binge frequency: Never   • Drug use: No   • Sexual " activity: Not on file   Lifestyle   • Physical activity:     Days per week: Not on file     Minutes per session: Not on file   • Stress: Not on file   Relationships   • Social connections:     Talks on phone: Not on file     Gets together: Not on file     Attends Rastafarian service: Not on file     Active member of club or organization: Not on file     Attends meetings of clubs or organizations: Not on file     Relationship status: Not on file   • Intimate partner violence:     Fear of current or ex partner: Not on file     Emotionally abused: Not on file     Physically abused: Not on file     Forced sexual activity: Not on file   Other Topics Concern   • Not on file   Social History Narrative   • Not on file     Non-smoker no IV drug cocaine or amphetamines  SURGICAL HISTORY  Past Surgical History:   Procedure Laterality Date   • GASTRIC SLEEVE LAPAROSCOPY N/A 9/22/2017    Procedure: GASTRIC SLEEVE LAPAROSCOPY;  Surgeon: Darius Segura M.D.;  Location: SURGERY [facility];  Service: General   • LIVER BIOPSY LAPAROSCOPIC N/A 9/22/2017    Procedure: LIVER BIOPSY LAPAROSCOPIC;  Surgeon: Darius Segura M.D.;  Location: SURGERY [facility];  Service: General   • OTHER  2011    Ablation, back pain       CURRENT MEDICATIONS  Home Medications     Reviewed by Adolfo Odom (Pharmacy Tech) on 08/20/19 at 1356  Med List Status: Complete   Medication Last Dose Status   acetaminophen (TYLENOL) 500 MG Tab > 1 week Active   celecoxib (CELEBREX) 100 MG Cap 8/19/2019 Active   methylPREDNISolone (MEDROL DOSEPAK) 4 MG Tablet Therapy Pack 8/16/2019 Active   nortriptyline (PAMELOR) 25 MG Cap 8/19/2019 Active   omeprazole (PRILOSEC) 20 MG delayed-release capsule 8/19/2019 Active   PARoxetine (PAXIL) 20 MG Tab 8/19/2019 Active   vitamin D, Ergocalciferol, (DRISDOL) 59820 units Cap capsule 8/15/2019 Active                ALLERGIES  No Known Allergies    PHYSICAL EXAM  VITAL SIGNS: /85   Pulse 69   Temp 36.9 °C  "(98.5 °F) (Temporal)   Resp (!) 27   Ht 1.676 m (5' 6\")   Wt 93.3 kg (205 lb 11 oz)   LMP 08/19/2019   SpO2 96%   BMI 33.20 kg/m²  Room air O2: 96    Constitutional: Well developed, Well nourished, No acute distress, Non-toxic appearance.   HENT: Normocephalic, Atraumatic, Bilateral external ears normal, Oropharynx moist, No oral exudates, Nose normal.   Eyes: PERRLA, EOMI, Conjunctiva normal, No discharge.   Neck: Normal range of motion, No tenderness, Supple, No stridor.   Cardiovascular: Normal heart rate, Normal rhythm, No murmurs, No rubs, No gallops.   Thorax & Lungs: Normal breath sounds, No respiratory distress, No wheezing, No chest tenderness.   Abdomen: Bowel sounds normal, Soft, No tenderness, No masses, No pulsatile masses.   Skin: Warm, Dry, No erythema, No rash.   Back: No tenderness, No CVA tenderness.   elodia, Normal sphincter tone. No external or internal lesions noted. Stool is normal color and heme negative. Extremities: Intact distal pulses, No edema, No tenderness, No cyanosis, No clubbing.   Musculoskeletal: Good range of motion in all major joints. No tenderness to palpation or major deformities noted.   Neurologic: Alert & oriented x 3, Normal motor function, Normal sensory function, No focal deficits noted.   Psychiatric anxious    EKG  Interpretation by me sinus rhythm.  Rate normal intervals and axis normal.  No ST segment elevation or depression no pathological T wave inversions.  Intervals and axis are normal no suggestion of ischemia or arrhythmia normal EKG    RADIOLOGY/PROCEDURES  US-RUQ   Final Result      Partially decompressed gallbladder wall is mildly thickened which could be secondary to the incomplete distention or cholecystitis      Nonmobile 24 mm gallstone      DX-CHEST-PORTABLE (1 VIEW)   Final Result      No radiographic evidence of acute cardiopulmonary process.        Results for orders placed or performed during the hospital encounter of 08/20/19   CBC WITH " DIFFERENTIAL   Result Value Ref Range    WBC 7.1 4.8 - 10.8 K/uL    RBC 4.00 (L) 4.20 - 5.40 M/uL    Hemoglobin 12.1 12.0 - 16.0 g/dL    Hematocrit 35.3 (L) 37.0 - 47.0 %    MCV 88.3 81.4 - 97.8 fL    MCH 30.3 27.0 - 33.0 pg    MCHC 34.3 33.6 - 35.0 g/dL    RDW 40.1 35.9 - 50.0 fL    Platelet Count 242 164 - 446 K/uL    MPV 10.1 9.0 - 12.9 fL    Neutrophils-Polys 55.80 44.00 - 72.00 %    Lymphocytes 34.90 22.00 - 41.00 %    Monocytes 5.60 0.00 - 13.40 %    Eosinophils 2.80 0.00 - 6.90 %    Basophils 0.60 0.00 - 1.80 %    Immature Granulocytes 0.30 0.00 - 0.90 %    Nucleated RBC 0.00 /100 WBC    Neutrophils (Absolute) 3.99 2.00 - 7.15 K/uL    Lymphs (Absolute) 2.49 1.00 - 4.80 K/uL    Monos (Absolute) 0.40 0.00 - 0.85 K/uL    Eos (Absolute) 0.20 0.00 - 0.51 K/uL    Baso (Absolute) 0.04 0.00 - 0.12 K/uL    Immature Granulocytes (abs) 0.02 0.00 - 0.11 K/uL    NRBC (Absolute) 0.00 K/uL   Comp Metabolic Panel   Result Value Ref Range    Sodium 138 135 - 145 mmol/L    Potassium 4.1 3.6 - 5.5 mmol/L    Chloride 106 96 - 112 mmol/L    Co2 23 20 - 33 mmol/L    Anion Gap 9.0 0.0 - 11.9    Glucose 92 65 - 99 mg/dL    Bun 18 8 - 22 mg/dL    Creatinine 0.63 0.50 - 1.40 mg/dL    Calcium 8.7 8.4 - 10.2 mg/dL    AST(SGOT) 18 12 - 45 U/L    ALT(SGPT) 9 2 - 50 U/L    Alkaline Phosphatase 59 30 - 99 U/L    Total Bilirubin 0.1 0.1 - 1.5 mg/dL    Albumin 3.8 3.2 - 4.9 g/dL    Total Protein 6.5 6.0 - 8.2 g/dL    Globulin 2.7 1.9 - 3.5 g/dL    A-G Ratio 1.4 g/dL   TROPONIN   Result Value Ref Range    Troponin T <6 6 - 19 ng/L   HCG QUAL SERUM   Result Value Ref Range    Beta-Hcg Qualitative Serum Negative Negative   LIPASE   Result Value Ref Range    Lipase 13 7 - 58 U/L   ESTIMATED GFR   Result Value Ref Range    GFR If African American >60 >60 mL/min/1.73 m 2    GFR If Non African American >60 >60 mL/min/1.73 m 2   EKG   Result Value Ref Range    Report       [facility]    Test Date:   2019  Pt Name:    NATHALIE GALLO                 Department: [facility]  MRN:        [id number]                      Room:  Gender:     Female                       Technician: JAYASHREE  :        [id number]                   Requested By:ER TRIAGE PROTOCOL  Order #:    [id number]                    Reading MD:    Measurements  Intervals                                Axis  Rate:       58                           P:          57  UT:         117                          QRS:        5  QRSD:       113                          T:          26  QT:         414  QTc:        407    Interpretive Statements  Sinus bradycardia  Borderline short UT interval  Borderline intraventricular conduction delay  Compared to ECG 2017 10:58:43  Left ventricular hypertrophy no longer present           COURSE & MEDICAL DECISION MAKING  Pertinent Labs & Imaging studies reviewed. (See chart for details)  Patient presents here primarily with right upper quadrant discomfort.  She also had some transient chest pain.  She does not have any explicit risk factors for heart disease such as known history family history cocaine amphetamines etc.  All of her cardiac work-up was reassuring, specifically normal EKG normal troponin.  She also has no leukocytosis transaminitis or elevation of lipase.  Ultrasound does demonstrate some nonspecific gallbladder wall thickening and a large gallstone.  I suspect she might be having some biliary colic.  She does not clinically require admission, but she will be referred as an outpatient to general surgery    FINAL IMPRESSION  1.  Epigastric pain  2.  Biliary colic         Electronically signed by: Nito Hernandez, 2019 2:03 PM

## 2019-08-20 NOTE — ED NOTES
DC instructions reviewed with pt. Verbalizes understanding. Denies further need at this time. Ambulated to Exit with visitor, with a steady gait.

## 2019-08-20 NOTE — ED NOTES
Pt updated that US has been added to plan. Pt understands plan as evidenced by verbalizing. NAD. VSS. Denies need at this time.

## 2019-08-20 NOTE — ED NOTES
PT to ED today for right sided chest pain that started approx. 11 am today. Pt was at work doing nothing tasking. Pt believes symptoms may have been anxiety related but pt does not have hx of anxiety. Further evaluation reveals that pt's mother recently passed away and has been stressful.

## 2019-08-20 NOTE — ED TRIAGE NOTES
Pt comes in by herself  C/o R U Q pain that started around 11:20 today  Worse after eating  No Hx of such   Pain rads up into R side chest area jut above the rib cage

## 2019-09-17 ENCOUNTER — TELEPHONE (OUTPATIENT)
Dept: NEUROLOGY | Facility: MEDICAL CENTER | Age: 29
End: 2019-09-17

## 2019-09-18 NOTE — TELEPHONE ENCOUNTER
Unfortunately, she will have to get off the drug, we will do so slowly, if she is on 3 tablets every evening, she is to go to 2 tablets every evening for 1 week, 1 tablet every evening for another week and then off entirely.  The side effects will go away eventually, it may take a week or so once she is off the drug.  We will need to try something else at that time.

## 2019-09-18 NOTE — TELEPHONE ENCOUNTER
----- Message from Emili Vasques, Med Ass't sent at 9/17/2019  1:28 PM PDT -----  Regarding: FW: Prescription Question  Contact: [phone]  Pt is having side effects from the Nortriptyline. Please advise see information below. KA  ----- Message -----  From: Jaqui Kumar  Sent: 9/17/2019  12:27 PM PDT  To: Emili Vasques, Med Ass't  Subject: RE: Prescription Question                        yes. Im at 75mg.   About two weeks ago. The nightmares are now getting to where I occasionally wake up screaming. (So that's special Hahahahha)   This dizziness has been getting worse over the last week.   Sometimes I shake - like my hands shake. Someone noticed yesterday.     I still have the numbness in my right leg.     Oh and yesterday it felt like zap pain coming from head/neck down my arms. It was most of the day.     I just want to make sure I'm okay. I absolutely hate being a pain in anyone's side.     ----- Message -----  From: Emili Vasques, Med Ass't  Sent: 9/17/19, 9:55 AM  To: Jaqui Kumar  Subject: RE: Prescription Question    Have you reached the full dose of the Nortriptyline 25 mg 3 tablets at night? If you did reach the max dose of the medication, when did you first notice these symptoms occurring?        ----- Message -----     From: Jaqui Kumar     Sent: 9/13/2019 10:17 PM PDT       To: Red Turner M.D.  Subject: Prescription Question    The nortriptyline is working for nerve pain - it is decreased. However, I'm having dizziness, occasionally it feels like an ocean in my head - not even sure how to explain that one, dry mouth, my tongue feels swollen some morning, nightmares, and headaches (mostly all day dull ache). Should I be worried?     Thank you! I know you're a busy man.   Jaqui

## 2019-10-17 ENCOUNTER — OFFICE VISIT (OUTPATIENT)
Dept: NEUROLOGY | Facility: MEDICAL CENTER | Age: 29
End: 2019-10-17
Payer: COMMERCIAL

## 2019-10-17 VITALS
BODY MASS INDEX: 33.75 KG/M2 | TEMPERATURE: 98.7 F | HEART RATE: 89 BPM | WEIGHT: 210 LBS | OXYGEN SATURATION: 100 % | HEIGHT: 66 IN | DIASTOLIC BLOOD PRESSURE: 78 MMHG | SYSTOLIC BLOOD PRESSURE: 114 MMHG

## 2019-10-17 DIAGNOSIS — R20.2 PARESTHESIAS: Primary | ICD-10-CM

## 2019-10-17 PROBLEM — E53.8 B12 DEFICIENCY: Status: ACTIVE | Noted: 2019-10-17

## 2019-10-17 PROCEDURE — 99214 OFFICE O/P EST MOD 30 MIN: CPT | Performed by: PSYCHIATRY & NEUROLOGY

## 2019-10-17 RX ORDER — PREGABALIN 50 MG/1
50 CAPSULE ORAL 3 TIMES DAILY
Qty: 90 CAP | Refills: 1 | Status: SHIPPED | OUTPATIENT
Start: 2019-10-17 | End: 2020-04-03 | Stop reason: SDUPTHER

## 2019-10-17 ASSESSMENT — ENCOUNTER SYMPTOMS
TINGLING: 1
FALLS: 0
SENSORY CHANGE: 1
FOCAL WEAKNESS: 0
BACK PAIN: 1
MYALGIAS: 1
MEMORY LOSS: 0
NECK PAIN: 1

## 2019-10-17 NOTE — PROGRESS NOTES
Subjective:      Jaqui Kumar is a 29 y.o. female who presents for follow-up, with a history of bilateral lower extremity paresthesias and back pain, but who has begun to suffer from more generalized pains and now tingling in the hands with head and neck movements.     HPI    Jaqui states that the symptoms remain about unchanged in the legs, we had placed her on Pamelor which did provide consistent benefit, but she began to develop problems with bad nightmares, etc.  The back pain remains, she denies radicular radiating symptoms.  The numbness and tingling are still more predominant in the right lower extremity, over the lateral thigh and into the foot.  The left lower extremity is mostly the foot.  She denies weakness.  Symptoms come and go seemingly independent of activity.  We have placed her on a Medrol Dosepak followed by Celebrex 200 mg daily, this provided some benefit with the back pain only.    More recently she has begun to notice paresthesias in both upper extremities and hands.  These can occur with head and neck movements though she has not been able to identify a pattern.  Neck flexion does not elicit symptoms, Valsalva on an occasion might increase the tingling in the fingers.  There is no loss of dexterity or actual weakness.    She is more aware of a generalized malaise.  Cognitively she is doing well.  Still working as an animal necropsy specialist, all of the physical labor required makes all of the symptoms worse.    MRI of the lumbar spine was done on August 19, 2019, revealing nonspecific multilevel degenerative changes but no evidence of central or lateral foraminal narrowing.  Brain and cervical spine MRI imaging with and without contrast from March 7, 2019 were normal and revealed minimal degenerative changes, respectively.    Medical, surgical and family histories are reviewed in the electronic health record, there are no new drug allergies.  She does have a history of migraine  "with aura, these are infrequent, not associated with these paresthesias.  She remains on Celebrex 200 mg daily, also Prilosec.    Review of Systems   Constitutional: Positive for malaise/fatigue.   Musculoskeletal: Positive for back pain, myalgias and neck pain. Negative for falls.   Neurological: Positive for tingling and sensory change. Negative for focal weakness.   Psychiatric/Behavioral: Negative for memory loss.   All other systems reviewed and are negative.       Objective:     /78 (BP Location: Left arm, Patient Position: Sitting, BP Cuff Size: Adult)   Pulse 89   Temp 37.1 °C (98.7 °F) (Temporal)   Ht 1.676 m (5' 6\")   Wt 95.3 kg (210 lb)   SpO2 100%   BMI 33.89 kg/m²      Physical Exam    She appears in no acute distress.  Vital signs are stable.  There is no malar rash or jaw claudication.  The neck is supple.  Range of motion is full, Lhermitte's phenomena elicits tingling in both hands, compression maneuvers do as well.  There is no radiating symptoms, simply an increase in the paresthesias of the fingers.  Carotid pulses are present without asymmetry.  Cardiac evaluation reveals a regular rhythm.  There is tenderness at the left elbow with some tingling into the hand, this is more obvious with the right elbow compression medially.  The tingling is mostly in the fourth and fifth fingers of the right hand.    Cognition and cranial nerve exams are intact.    Musculoskeletal exam again reveals normal tone without tremor or drift.  Strength is intact in all 4 extremities, distal and proximal muscle groups.  Reflexes are present at all points, none are dropped, and there are no asymmetries.  Both toes are downgoing.  Repetitive movements and coordination with all 4 extremities remain intact.  She walks with normal station.  Sensory exam is intact to vibration, temperature and pinprick.     Assessment/Plan:     1. Paresthesias  I am beginning to wonder if in fact we are seeing sensory symptoms " that suggest neuropathic etiology but may in fact be due to fibromyalgia.  She is already had what sounds like was an autoimmune work-up but this was in the past and thus needs to be repeated.  A small fiber sensory polyneuropathy could explain a lot of the symptoms, thus EMG/NCV study should be repeated.  These were done earlier this year, unfortunately the reports of the study were never forwarded to my office from [facility].  These were reportedly normal.  The rationale for this was reviewed.    The exam and her complaints suggest a small fiber sensory polyneuropathy, we should look for asymptomatic motor involvement, the exam would also help direct additional blood work.  This is not likely familial or demyelinating in nature.  The rationale for this was reviewed and full.  We will call her with test results as they come in, otherwise we talk specifics at her office visit.    Symptomatically, she seems to be having a heck of a time tolerating medication, most frustrating when the medication works.  Neurontin has already had problems with side effects, as did Elavil, Lyrica 50 mg will be started daily, increasing weekly by 50 mg increments up to 50 mg, 3 times daily by week #3.  Side effects were reviewed.  She will call the office to update, otherwise we will follow-up in several months.    - pregabalin (LYRICA) 50 MG capsule; Take 1 Cap by mouth 3 times a day for 61 days.  Dispense: 90 Cap; Refill: 1  - REFERRAL TO NEURODIAGNOSTICS (EEG,EP,EMG/NCS/DBS) Modality Requested: EMG/NCS-Comment Extremities  - WESTERGREN SED RATE; Future  - CRP HIGH SENSITIVE (CARDIAC); Future  - HERMILA W/REFLEX  - HEP B SURFACE ANTIGEN; Future  - SJOGREN'S AB, ANTI-SS-A/-SS-B  - SPEP W/REFLEX TO CORNELIUS, A, G, M; Future  - HIV AG/AB COMBO ASSAY DIAGNOSTIC; Future  - VITAMIN E; Future  - ASIALOGM1 ANTIBODY KAY; Future  - MAG ANTIBODY SERUM; Future    Time: 25 minutes spent face-to-face for exam, review, discussion, and  education, of this over 50% of the time spent counseling and coordinating care.

## 2019-10-28 ENCOUNTER — HOSPITAL ENCOUNTER (OUTPATIENT)
Dept: LAB | Facility: MEDICAL CENTER | Age: 29
End: 2019-10-28
Attending: PSYCHIATRY & NEUROLOGY
Payer: COMMERCIAL

## 2019-10-28 DIAGNOSIS — R20.2 PARESTHESIAS: ICD-10-CM

## 2019-10-28 LAB
CRP SERPL HS-MCNC: 0.6 MG/L (ref 0–7.5)
ERYTHROCYTE [SEDIMENTATION RATE] IN BLOOD BY WESTERGREN METHOD: 12 MM/HOUR (ref 0–20)

## 2019-10-28 PROCEDURE — 84165 PROTEIN E-PHORESIS SERUM: CPT

## 2019-10-28 PROCEDURE — 85652 RBC SED RATE AUTOMATED: CPT

## 2019-10-28 PROCEDURE — 86235 NUCLEAR ANTIGEN ANTIBODY: CPT

## 2019-10-28 PROCEDURE — 87340 HEPATITIS B SURFACE AG IA: CPT

## 2019-10-28 PROCEDURE — 86038 ANTINUCLEAR ANTIBODIES: CPT

## 2019-10-28 PROCEDURE — 87389 HIV-1 AG W/HIV-1&-2 AB AG IA: CPT

## 2019-10-28 PROCEDURE — 83516 IMMUNOASSAY NONANTIBODY: CPT | Mod: 91

## 2019-10-28 PROCEDURE — 36415 COLL VENOUS BLD VENIPUNCTURE: CPT

## 2019-10-28 PROCEDURE — 84446 ASSAY OF VITAMIN E: CPT

## 2019-10-28 PROCEDURE — 84155 ASSAY OF PROTEIN SERUM: CPT

## 2019-10-28 PROCEDURE — 86141 C-REACTIVE PROTEIN HS: CPT

## 2019-10-29 LAB
HBV SURFACE AG SER QL: NEGATIVE
HIV 1+2 AB+HIV1 P24 AG SERPL QL IA: NON REACTIVE

## 2019-10-30 LAB
ENA SS-B IGG SER IA-ACNC: 4 AU/ML (ref 0–40)
NUCLEAR IGG SER QL IA: NORMAL
SSA52 R0ENA AB IGG Q0420: 2 AU/ML (ref 0–40)
SSA60 R0ENA AB IGG Q0419: 0 AU/ML (ref 0–40)

## 2019-10-31 LAB
A-TOCOPHEROL VIT E SERPL-MCNC: 8 MG/L (ref 5.5–18)
BETA+GAMMA TOCOPHEROL SERPL-MCNC: 0.9 MG/L (ref 0–6)
MAG IGM SER IA-ACNC: 194 TU (ref 0–999)

## 2019-11-01 ENCOUNTER — TELEPHONE (OUTPATIENT)
Dept: NEUROLOGY | Facility: MEDICAL CENTER | Age: 29
End: 2019-11-01

## 2019-11-01 LAB
ALBUMIN SERPL ELPH-MCNC: 3.82 G/DL (ref 3.75–5.01)
ALPHA1 GLOB SERPL ELPH-MCNC: 0.23 G/DL (ref 0.19–0.46)
ALPHA2 GLOB SERPL ELPH-MCNC: 0.66 G/DL (ref 0.48–1.05)
B-GLOBULIN SERPL ELPH-MCNC: 0.81 G/DL (ref 0.48–1.1)
GAMMA GLOB SERPL ELPH-MCNC: 0.88 G/DL (ref 0.62–1.51)
GD1A GANGL AB SER IA-ACNC: 26 IV (ref 0–50)
GD1B GANGL AB SER IA-ACNC: 30 IV (ref 0–50)
GM1 ASIALO AB SER IA-ACNC: 7 IV (ref 0–50)
GM1 GANGL IGG+IGM SER QL IA: 20 IV (ref 0–50)
GM2 GANGL IGG+IGM SER QL IA: 11 IV (ref 0–50)
GQ1B GANGL AB SER-ACNC: 6 IV (ref 0–50)
INTERPRETATION SERPL IFE-IMP: NORMAL
MONOCLON BAND OBS SERPL: NORMAL
PATHOLOGY STUDY: NORMAL
PROT SERPL-MCNC: 6.4 G/DL (ref 6.3–8.2)

## 2019-11-01 NOTE — TELEPHONE ENCOUNTER
Per [facility] (Orin) medical Assistant. Wanted last dictation note sent to her.  Phone Number for Orin.  Phone: [phone] ext. [phone]  Fax: [phone]  Sent.

## 2019-11-14 ENCOUNTER — HOSPITAL ENCOUNTER (OUTPATIENT)
Dept: RADIOLOGY | Facility: MEDICAL CENTER | Age: 29
End: 2019-11-14
Attending: NURSE PRACTITIONER
Payer: COMMERCIAL

## 2019-11-14 ENCOUNTER — OFFICE VISIT (OUTPATIENT)
Dept: URGENT CARE | Facility: PHYSICIAN GROUP | Age: 29
End: 2019-11-14
Payer: COMMERCIAL

## 2019-11-14 VITALS
SYSTOLIC BLOOD PRESSURE: 120 MMHG | DIASTOLIC BLOOD PRESSURE: 72 MMHG | TEMPERATURE: 97.3 F | RESPIRATION RATE: 18 BRPM | OXYGEN SATURATION: 93 % | BODY MASS INDEX: 34.72 KG/M2 | HEIGHT: 66 IN | WEIGHT: 216 LBS | HEART RATE: 82 BPM

## 2019-11-14 DIAGNOSIS — Z87.81 HISTORY OF RIB FRACTURE: ICD-10-CM
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
## 2019-11-14 DIAGNOSIS — M43.00 PARS DEFECT: ICD-10-CM

## 2019-11-14 PROCEDURE — 73501 X-RAY EXAM HIP UNI 1 VIEW: CPT | Mod: RT

## 2019-11-14 PROCEDURE — 73590 X-RAY EXAM OF LOWER LEG: CPT | Mod: RT

## 2019-11-14 PROCEDURE — 72100 X-RAY EXAM L-S SPINE 2/3 VWS: CPT

## 2019-11-14 PROCEDURE — 71111 X-RAY EXAM RIBS/CHEST4/> VWS: CPT

## 2019-11-14 PROCEDURE — 99214 OFFICE O/P EST MOD 30 MIN: CPT | Performed by: NURSE PRACTITIONER

## 2019-11-14 RX ORDER — CYCLOBENZAPRINE HCL 5 MG
5-10 TABLET ORAL 3 TIMES DAILY PRN
Qty: 30 TAB | Refills: 0 | Status: SHIPPED | OUTPATIENT
Start: 2019-11-14 | End: 2021-09-09

## 2019-11-14 RX ORDER — LIDOCAINE 50 MG/G
1 PATCH TOPICAL EVERY 24 HOURS
Qty: 10 PATCH | Refills: 0 | Status: SHIPPED | OUTPATIENT
Start: 2019-11-14 | End: 2023-10-04

## 2019-11-14 RX ORDER — OXYCODONE HYDROCHLORIDE AND ACETAMINOPHEN 5; 325 MG/1; MG/1
1-2 TABLET ORAL EVERY 4 HOURS PRN
Qty: 15 TAB | Refills: 0 | Status: SHIPPED | OUTPATIENT
Start: 2019-11-14 | End: 2019-11-21

## 2019-11-14 ASSESSMENT — ENCOUNTER SYMPTOMS: HEADACHES: 1

## 2019-11-15 NOTE — PATIENT INSTRUCTIONS
Rib Contusion  Introduction  A rib contusion is a deep bruise on your rib area. Contusions are the result of a blunt trauma that causes bleeding and injury to the tissues under the skin. A rib contusion may involve bruising of the ribs and of the skin and muscles in the area. The skin overlying the contusion may turn blue, purple, or yellow. Minor injuries will give you a painless contusion, but more severe contusions may stay painful and swollen for a few weeks.  What are the causes?  A contusion is usually caused by a blow, trauma, or direct force to an area of the body. This often occurs while playing contact sports.  What are the signs or symptoms?  · Swelling and redness of the injured area.  · Discoloration of the injured area.  · Tenderness and soreness of the injured area.  · Pain with or without movement.  How is this diagnosed?  The diagnosis can be made by taking a medical history and performing a physical exam. An X-ray, CT scan, or MRI may be needed to determine if there were any associated injuries, such as broken bones (fractures) or internal injuries.  How is this treated?  Often, the best treatment for a rib contusion is rest. Icing or applying cold compresses to the injured area may help reduce swelling and inflammation. Deep breathing exercises may be recommended to reduce the risk of partial lung collapse and pneumonia. Over-the-counter or prescription medicines may also be recommended for pain control.  Follow these instructions at home:  · Apply ice to the injured area:  ¨ Put ice in a plastic bag.  ¨ Place a towel between your skin and the bag.  ¨ Leave the ice on for 20 minutes, 2-3 times per day.  · Take medicines only as directed by your health care provider.  · Rest the injured area. Avoid strenuous activity and any activities or movements that cause pain. Be careful during activities and avoid bumping the injured area.  · Perform deep-breathing exercises as directed by your health care  provider.  · Do not lift anything that is heavier than 5 lb (2.3 kg) until your health care provider approves.  · Do not use any tobacco products, including cigarettes, chewing tobacco, or electronic cigarettes. If you need help quitting, ask your health care provider.  Contact a health care provider if:  · You have increased bruising or swelling.  · You have pain that is not controlled with treatment.  · You have a fever.  Get help right away if:  · You have difficulty breathing or shortness of breath.  · You develop a continual cough, or you cough up thick or bloody sputum.  · You feel sick to your stomach (nauseous), you throw up (vomit), or you have abdominal pain.  This information is not intended to replace advice given to you by your health care provider. Make sure you discuss any questions you have with your health care provider.  Document Released: 09/12/2002 Document Revised: 05/25/2017 Document Reviewed: 09/29/2015  © 2017 [employer]    Rib Contusion  Introduction  A rib contusion is a deep bruise on your rib area. Contusions are the result of a blunt trauma that causes bleeding and injury to the tissues under the skin. A rib contusion may involve bruising of the ribs and of the skin and muscles in the area. The skin overlying the contusion may turn blue, purple, or yellow. Minor injuries will give you a painless contusion, but more severe contusions may stay painful and swollen for a few weeks.  What are the causes?  A contusion is usually caused by a blow, trauma, or direct force to an area of the body. This often occurs while playing contact sports.  What are the signs or symptoms?  · Swelling and redness of the injured area.  · Discoloration of the injured area.  · Tenderness and soreness of the injured area.  · Pain with or without movement.  How is this diagnosed?  The diagnosis can be made by taking a medical history and performing a physical exam. An X-ray, CT scan, or MRI may be needed to determine  if there were any associated injuries, such as broken bones (fractures) or internal injuries.  How is this treated?  Often, the best treatment for a rib contusion is rest. Icing or applying cold compresses to the injured area may help reduce swelling and inflammation. Deep breathing exercises may be recommended to reduce the risk of partial lung collapse and pneumonia. Over-the-counter or prescription medicines may also be recommended for pain control.  Follow these instructions at home:  · Apply ice to the injured area:  ¨ Put ice in a plastic bag.  ¨ Place a towel between your skin and the bag.  ¨ Leave the ice on for 20 minutes, 2-3 times per day.  · Take medicines only as directed by your health care provider.  · Rest the injured area. Avoid strenuous activity and any activities or movements that cause pain. Be careful during activities and avoid bumping the injured area.  · Perform deep-breathing exercises as directed by your health care provider.  · Do not lift anything that is heavier than 5 lb (2.3 kg) until your health care provider approves.  · Do not use any tobacco products, including cigarettes, chewing tobacco, or electronic cigarettes. If you need help quitting, ask your health care provider.  Contact a health care provider if:  · You have increased bruising or swelling.  · You have pain that is not controlled with treatment.  · You have a fever.  Get help right away if:  · You have difficulty breathing or shortness of breath.  · You develop a continual cough, or you cough up thick or bloody sputum.  · You feel sick to your stomach (nauseous), you throw up (vomit), or you have abdominal pain.  This information is not intended to replace advice given to you by your health care provider. Make sure you discuss any questions you have with your health care provider.  Document Released: 09/12/2002 Document Revised: 05/25/2017 Document Reviewed: 09/29/2015  © 2017 [employer]  Blunt Trauma  You have been  "evaluated for injuries. You have been examined and your caregiver has not found injuries serious enough to require hospitalization.  It is common to have multiple bruises and sore muscles following an accident. These tend to feel worse for the first 24 hours. You will feel more stiffness and soreness over the next several hours and worse when you wake up the first morning after your accident. After this point, you should begin to improve with each passing day. The amount of improvement depends on the amount of damage done in the accident.  Following your accident, if some part of your body does not work as it should, or if the pain in any area continues to increase, you should return to the Emergency Department for re-evaluation.   HOME CARE INSTRUCTIONS   Routine care for sore areas should include:  · Ice to sore areas every 2 hours for 20 minutes while awake for the next 2 days.  · Drink extra fluids (not alcohol).  · Take a hot or warm shower or bath once or twice a day to increase blood flow to sore muscles. This will help you \"limber up\".  · Activity as tolerated. Lifting may aggravate neck or back pain.  · Only take over-the-counter or prescription medicines for pain, discomfort, or fever as directed by your caregiver. Do not use aspirin. This may increase bruising or increase bleeding if there are small areas where this is happening.  SEEK IMMEDIATE MEDICAL CARE IF:  · Numbness, tingling, weakness, or problem with the use of your arms or legs.  · A severe headache is not relieved with medications.  · There is a change in bowel or bladder control.  · Increasing pain in any areas of the body.  · Short of breath or dizzy.  · Nauseated, vomiting, or sweating.  · Increasing belly (abdominal) discomfort.  · Blood in urine, stool, or vomiting blood.  · Pain in either shoulder in an area where a shoulder strap would be.  · Feelings of lightheadedness or if you have a fainting episode.  Sometimes it is not possible to " identify all injuries immediately after the trauma. It is important that you continue to monitor your condition after the emergency department visit. If you feel you are not improving, or improving more slowly than should be expected, call your physician. If you feel your symptoms (problems) are worsening, return to the Emergency Department immediately.  Document Released: 09/13/2002 Document Revised: 03/11/2013 Document Reviewed: 08/05/2009  [employer]® Patient Information ©2014 [employer].    Spondylolysis  Spondylolysis is a small break or crack (stress fracture) in a bone in the spine (vertebra) in the lower back (lumbar spine). The stress fracture occurs on the bony mass between and behind the vertebra. Spondylolysis may be caused by an injury (trauma) or by overuse.  Since the lower back is almost always under pressure from daily living, this stress fracture usually does not heal normally. Spondylolysis may eventually cause one vertebra to slip forward and out of place (spondylolisthesis).  What are the causes?  This condition may be caused by:  · Trauma, such as a fall.  · Excessive wear and tear. This is often a result of doing sports or physical activities that involve repetitive overstretching (hyperextension) and rotation of the spine.  What increases the risk?  You may have a greater risk for spondylolysis if you participate in:  · Gymnastics.  · Weight lifting.  · Rowing.  · Diving.  · Wrestling.  · Tennis.  · Soccer.  What are the signs or symptoms?  Symptoms of this condition may include:  · Long-lasting (chronic) pain in the lower back.  · Stiffness in the back or the legs.  · Tightness in the hamstring muscles, which are in the backs of the thighs.  In some cases, there may be no symptoms of this condition.  How is this diagnosed?     This condition may be diagnosed based on:  · Your symptoms.  · Your medical history.  · A physical exam.  ¨ Your health care provider may push on certain areas to  determine the source of your pain.  ¨ You may be asked to bend forward, backward, and side to side so your health care provider can assess the severity of your pain and your range of motion.  · Imaging tests, such as:  ¨ X-rays.  ¨ CT scan.  ¨ MRI.  How is this treated?  Treatment for this condition may include:  · Resting. This may involve avoiding or modifying activities that put strain on your back until your symptoms improve.  · Medicines to help relieve pain.  · NSAIDs to help reduce swelling and discomfort.  · Injections of medicine (cortisone) in your back. These injections can to help relieve pain and numbness.  · A brace to stabilize and support your back.  · Physical therapy. This may include working with an occupational therapist or physical therapist who can teach you how to reduce pressure on your back while you do everyday activities.  · Surgery. This may be needed if you have:  ¨ A severe injury.  ¨ Pain that lasts for more than 6 months.  Follow these instructions at home:  If you have a brace:  · Wear it as told by your health care provider. Remove it only as told by your health care provider.  · Do not let your brace get wet if it is not waterproof.  · Keep the brace clean.  Driving  · Do not drive or operate heavy machinery until you know how your pain medicine affects you.  · Ask your health care provider when it is safe to drive if you have a back brace.  Activity  · Rest and return to your normal activities as told by your health care provider. Ask your health care provider what activities are safe for you.  · Avoid activities that take a lot of effort (are strenuous) for as long as told by your health care provider.  · Do exercises as told by your health care provider.  General instructions  · Take over-the-counter and prescription medicines only as told by your health care provider.  · If you have questions or concerns about safety while taking pain medicine, talk with your health care  provider.  · Do not use any tobacco products, such as cigarettes, chewing tobacco, and e-cigarettes. Tobacco can delay bone healing. If you need help quitting, ask your health care provider.  · Keep all follow-up visits as told by your health care provider. This is important.  How is this prevented?  · Warm up and stretch before being active.  · Cool down and stretch after being active.  · Give your body time to rest between periods of activity.  · Make sure to use equipment that fits you.  · Be safe and responsible while being active to avoid falls.  · Do at least 150 minutes of moderate-intensity exercise each week, such as brisk walking or water aerobics.  · Maintain physical fitness, including:  ¨ Strength.  ¨ Flexibility.  ¨ Cardiovascular fitness.  ¨ Endurance.  Contact a health care provider if:  · You have pain that gets worse or does not get better.  Get help right away if:  · You have severe back pain.  · You develop weakness or numbness in your legs.  · You are unable to stand or walk.  This information is not intended to replace advice given to you by your health care provider. Make sure you discuss any questions you have with your health care provider.  Document Released: 12/18/2006 Document Revised: 08/24/2017 Document Reviewed: 09/27/2016  [employer] Interactive Patient Education © 2017 [employer]

## 2019-11-15 NOTE — PROGRESS NOTES
"  Subjective:     Jaqui Kumar is a 29 y.o. female who presents for Fall (leg,back, rib x2d) and Head Pain (pain x2d)      Fell off horse, landed on Rt buttock. No LOC. Didn't hit head. Horse stepped on leg twice. Hurts to breath, bilateral ribs. Rt head pain. Pain 8/10. Slightly blurred vision. Some nausea, no vomitting. Rt shin, rt ankle, sepped on, with bruising. Dog had spooked horse.         Fall   The point of impact was the buttocks and right hip. The pain is present in the right lower leg and right hip. The pain is moderate. Associated symptoms include headaches. Pertinent negatives include no abdominal pain, fever, hematuria, loss of consciousness, tingling or vomiting.   Headache    Associated symptoms include back pain. Pertinent negatives include no abdominal pain, fever, neck pain, tingling or vomiting.       Past Medical History:   Diagnosis Date   • Anesthesia     \"Grandma coded in surgery, she was ok\"   • Arthritis     osteo-back and knees   • Pain 09/2017    back, knees, feet       Past Surgical History:   Procedure Laterality Date   • GASTRIC SLEEVE LAPAROSCOPY N/A 9/22/2017    Procedure: GASTRIC SLEEVE LAPAROSCOPY;  Surgeon: Darius Segura M.D.;  Location: SURGERY [facility];  Service: General   • LIVER BIOPSY LAPAROSCOPIC N/A 9/22/2017    Procedure: LIVER BIOPSY LAPAROSCOPIC;  Surgeon: Darius Segura M.D.;  Location: SURGERY [facility];  Service: General   • OTHER  2011    Ablation, back pain       Social History     Socioeconomic History   • Marital status: Single     Spouse name: Not on file   • Number of children: Not on file   • Years of education: Not on file   • Highest education level: Not on file   Occupational History   • Not on file   Social Needs   • Financial resource strain: Not on file   • Food insecurity:     Worry: Not on file     Inability: Not on file   • Transportation needs:     Medical: Not on file     Non-medical: Not on file   Tobacco Use   • Smoking " "status: Never Smoker   • Smokeless tobacco: Never Used   Substance and Sexual Activity   • Alcohol use: Yes     Frequency: Monthly or less     Drinks per session: 1 or 2     Binge frequency: Never   • Drug use: No   • Sexual activity: Not on file   Lifestyle   • Physical activity:     Days per week: Not on file     Minutes per session: Not on file   • Stress: Not on file   Relationships   • Social connections:     Talks on phone: Not on file     Gets together: Not on file     Attends [employer] service: Not on file     Active member of club or organization: Not on file     Attends meetings of clubs or organizations: Not on file     Relationship status: Not on file   • Intimate partner violence:     Fear of current or ex partner: Not on file     Emotionally abused: Not on file     Physically abused: Not on file     Forced sexual activity: Not on file   Other Topics Concern   • Not on file   Social History Narrative   • Not on file        No family history on file.     No Known Allergies    Review of Systems   Constitutional: Negative for fever.   HENT: Negative for ear discharge.    Respiratory: Negative for hemoptysis and wheezing.         Hurts to breath.   Cardiovascular: Negative for palpitations.        Bilateral rib pain.   Gastrointestinal: Negative for abdominal pain, blood in stool and vomiting.   Genitourinary: Negative for hematuria.   Musculoskeletal: Positive for back pain, falls, joint pain and myalgias. Negative for neck pain.   Skin:        Bruising and abrasions. No bleeding.   Neurological: Positive for headaches. Negative for tingling, sensory change, speech change, focal weakness and loss of consciousness.   All other systems reviewed and are negative.       Objective:   /72 (BP Location: Left arm, Patient Position: Sitting, BP Cuff Size: Adult)   Pulse 82   Temp 36.3 °C (97.3 °F) (Temporal)   Resp 18   Ht 1.676 m (5' 6\")   Wt 98 kg (216 lb)   SpO2 93%   BMI 34.86 kg/m²     Physical " Exam  Vitals signs reviewed.   Constitutional:       General: She is not in acute distress.     Appearance: She is well-developed.   HENT:      Head: Normocephalic and atraumatic.      Right Ear: External ear normal. No drainage.      Left Ear: External ear normal. No drainage.      Nose: Nose normal. No signs of injury or rhinorrhea.      Mouth/Throat:      Mouth: Mucous membranes are moist. Mucous membranes are pale.   Eyes:      Extraocular Movements: Extraocular movements intact.      Conjunctiva/sclera: Conjunctivae normal.      Pupils: Pupils are equal, round, and reactive to light.   Neck:      Musculoskeletal: Full passive range of motion without pain, normal range of motion and neck supple. No neck rigidity or spinous process tenderness.   Cardiovascular:      Rate and Rhythm: Normal rate.   Pulmonary:      Effort: Pulmonary effort is normal. No tachypnea, accessory muscle usage, prolonged expiration, respiratory distress or retractions.      Breath sounds: Normal breath sounds. No stridor or decreased air movement. No decreased breath sounds.   Chest:      Chest wall: Tenderness present. No deformity, swelling, crepitus or edema.   Abdominal:      General: Bowel sounds are normal. There is no distension. There are no signs of injury.      Palpations: Abdomen is soft.      Tenderness: There is no tenderness.      Comments: No abdominal bruising.   Musculoskeletal:         General: Swelling, tenderness and signs of injury present. No deformity.      Right hip: She exhibits tenderness and swelling. She exhibits normal range of motion, normal strength, no crepitus and no deformity.      Right knee: Normal.      Right ankle: She exhibits ecchymosis. She exhibits normal range of motion, no swelling and normal pulse.      Cervical back: Normal.      Thoracic back: Normal.      Lumbar back: She exhibits tenderness and bony tenderness. She exhibits no swelling, no edema and no deformity.      Right lower leg: She  exhibits tenderness, bony tenderness and swelling. She exhibits no deformity. Edema present.      Left lower leg: No edema.      Comments: Abrasions inner right ankle. Bruise to right shin, swelling, TTP, positive squeeze test. Right Hip pain with ROM, TTP, bruising. Mid lumbar TTP. Bilateral ribs tender to palpation, No bruising or retractions.    Skin:     General: Skin is warm and dry.      Findings: Abrasion and bruising present. No rash.   Neurological:      General: No focal deficit present.      Mental Status: She is alert and oriented to person, place, and time.      GCS: GCS eye subscore is 4. GCS verbal subscore is 5. GCS motor subscore is 6.      Motor: Motor function is intact.   Psychiatric:         Attention and Perception: Attention normal.         Mood and Affect: Mood normal.         Speech: Speech normal.         Behavior: Behavior normal.         Thought Content: Thought content normal.         Judgment: Judgment normal.         Assessment/Plan:   1. Fall from horse, initial encounter  - DX-HIP-UNILATERAL-WITH PELVIS-1 VIEW RIGHT; Future  - CH-VOYV-NCIAOHPVI (WITH 1-VIEW CXR); Future  - DX-TIBIA AND FIBULA RIGHT; Future  - DX-LUMBAR SPINE-2 OR 3 VIEWS; Future  - cyclobenzaprine (FLEXERIL) 5 MG tablet; Take 1-2 Tabs by mouth 3 times a day as needed.  Dispense: 30 Tab; Refill: 0  - lidocaine (LIDODERM) 5 % Patch; Apply 1 Patch to skin as directed every 24 hours.  Dispense: 10 Patch; Refill: 0  - oxyCODONE-acetaminophen (PERCOCET) 5-325 MG Tab; Take 1-2 Tabs by mouth every four hours as needed for up to 10 doses.  Dispense: 15 Tab; Refill: 0    2. Pars defect  - REFERRAL TO ORTHOPEDICS  - oxyCODONE-acetaminophen (PERCOCET) 5-325 MG Tab; Take 1-2 Tabs by mouth every four hours as needed for up to 10 doses.  Dispense: 15 Tab; Refill: 0    3. History of rib fracture  - oxyCODONE-acetaminophen (PERCOCET) 5-325 MG Tab; Take 1-2 Tabs by mouth every four hours as needed for up to 10 doses.  Dispense: 15  Tab; Refill: 0    Other orders  - Consent for Opiate Prescription  -Take medication as prescribed. Discussed sedative effects of muscle relaxers.  -Can take OTC Tylenol and ibuprofen as directed for pain.   -Temperature Therapy: Heat or ice, whichever feels better.  -Gentle ROM back stretches and exercises. Resume activity as tolerated.  -Follow up with your primary care doctor.    -Follow up for persistent, new, or worsening pain. Emergently for weakness, loss of bowel or bladder, groin pain or numbness, fevers, severe headache, lethargy, vomiting, abdominal pain, worsening symptoms, shortness of breath.    Differential diagnosis, natural history, supportive care, and indications for immediate follow-up discussed.

## 2019-11-18 ASSESSMENT — ENCOUNTER SYMPTOMS
VOMITING: 0
SPEECH CHANGE: 0
HEMOPTYSIS: 0
SENSORY CHANGE: 0
PALPITATIONS: 0
FALLS: 1
BLOOD IN STOOL: 0
NECK PAIN: 0
FEVER: 0
WHEEZING: 0
ABDOMINAL PAIN: 0
FOCAL WEAKNESS: 0
LOSS OF CONSCIOUSNESS: 0
TINGLING: 0
MYALGIAS: 1
BACK PAIN: 1

## 2019-12-05 ENCOUNTER — TELEPHONE (OUTPATIENT)
Dept: NEUROLOGY | Facility: MEDICAL CENTER | Age: 29
End: 2019-12-05

## 2019-12-06 ENCOUNTER — HOSPITAL ENCOUNTER (OUTPATIENT)
Dept: LAB | Facility: MEDICAL CENTER | Age: 29
End: 2019-12-06
Attending: PHYSICIAN ASSISTANT
Payer: COMMERCIAL

## 2019-12-06 LAB
25(OH)D3 SERPL-MCNC: 41 NG/ML (ref 30–100)
ALBUMIN SERPL BCP-MCNC: 4.4 G/DL (ref 3.2–4.9)
ALBUMIN/GLOB SERPL: 1.6 G/DL
ALP SERPL-CCNC: 46 U/L (ref 30–99)
ALT SERPL-CCNC: 13 U/L (ref 2–50)
ANION GAP SERPL CALC-SCNC: 9 MMOL/L (ref 0–11.9)
AST SERPL-CCNC: 16 U/L (ref 12–45)
BASOPHILS # BLD AUTO: 0.6 % (ref 0–1.8)
BASOPHILS # BLD: 0.03 K/UL (ref 0–0.12)
BILIRUB SERPL-MCNC: 0.5 MG/DL (ref 0.1–1.5)
BUN SERPL-MCNC: 23 MG/DL (ref 8–22)
CALCIUM SERPL-MCNC: 9.5 MG/DL (ref 8.5–10.5)
CHLORIDE SERPL-SCNC: 105 MMOL/L (ref 96–112)
CHOLEST SERPL-MCNC: 159 MG/DL (ref 100–199)
CO2 SERPL-SCNC: 23 MMOL/L (ref 20–33)
CREAT SERPL-MCNC: 0.66 MG/DL (ref 0.5–1.4)
EOSINOPHIL # BLD AUTO: 0.15 K/UL (ref 0–0.51)
EOSINOPHIL NFR BLD: 2.9 % (ref 0–6.9)
ERYTHROCYTE [DISTWIDTH] IN BLOOD BY AUTOMATED COUNT: 40.3 FL (ref 35.9–50)
FASTING STATUS PATIENT QL REPORTED: NORMAL
FOLATE SERPL-MCNC: 4.8 NG/ML
GLOBULIN SER CALC-MCNC: 2.7 G/DL (ref 1.9–3.5)
GLUCOSE SERPL-MCNC: 89 MG/DL (ref 65–99)
HCT VFR BLD AUTO: 41.3 % (ref 37–47)
HDLC SERPL-MCNC: 63 MG/DL
HGB BLD-MCNC: 13.6 G/DL (ref 12–16)
IMM GRANULOCYTES # BLD AUTO: 0.04 K/UL (ref 0–0.11)
IMM GRANULOCYTES NFR BLD AUTO: 0.8 % (ref 0–0.9)
IRON SATN MFR SERPL: 49 % (ref 15–55)
IRON SERPL-MCNC: 159 UG/DL (ref 40–170)
LDLC SERPL CALC-MCNC: 73 MG/DL
LYMPHOCYTES # BLD AUTO: 2.29 K/UL (ref 1–4.8)
LYMPHOCYTES NFR BLD: 43.8 % (ref 22–41)
MCH RBC QN AUTO: 29.8 PG (ref 27–33)
MCHC RBC AUTO-ENTMCNC: 32.9 G/DL (ref 33.6–35)
MCV RBC AUTO: 90.6 FL (ref 81.4–97.8)
MONOCYTES # BLD AUTO: 0.32 K/UL (ref 0–0.85)
MONOCYTES NFR BLD AUTO: 6.1 % (ref 0–13.4)
NEUTROPHILS # BLD AUTO: 2.4 K/UL (ref 2–7.15)
NEUTROPHILS NFR BLD: 45.8 % (ref 44–72)
NRBC # BLD AUTO: 0 K/UL
NRBC BLD-RTO: 0 /100 WBC
PLATELET # BLD AUTO: 276 K/UL (ref 164–446)
PMV BLD AUTO: 10.7 FL (ref 9–12.9)
POTASSIUM SERPL-SCNC: 3.7 MMOL/L (ref 3.6–5.5)
PREALB SERPL-MCNC: 25 MG/DL (ref 18–38)
PROT SERPL-MCNC: 7.1 G/DL (ref 6–8.2)
RBC # BLD AUTO: 4.56 M/UL (ref 4.2–5.4)
SODIUM SERPL-SCNC: 137 MMOL/L (ref 135–145)
TIBC SERPL-MCNC: 325 UG/DL (ref 250–450)
TRANSFERRIN SERPL-MCNC: 224 MG/DL (ref 200–370)
TRIGL SERPL-MCNC: 114 MG/DL (ref 0–149)
VIT B12 SERPL-MCNC: 362 PG/ML (ref 211–911)
WBC # BLD AUTO: 5.2 K/UL (ref 4.8–10.8)

## 2019-12-06 PROCEDURE — 36415 COLL VENOUS BLD VENIPUNCTURE: CPT

## 2019-12-06 PROCEDURE — 84466 ASSAY OF TRANSFERRIN: CPT

## 2019-12-06 PROCEDURE — 80061 LIPID PANEL: CPT

## 2019-12-06 PROCEDURE — 82746 ASSAY OF FOLIC ACID SERUM: CPT

## 2019-12-06 PROCEDURE — 80053 COMPREHEN METABOLIC PANEL: CPT

## 2019-12-06 PROCEDURE — 85025 COMPLETE CBC W/AUTO DIFF WBC: CPT

## 2019-12-06 PROCEDURE — 82607 VITAMIN B-12: CPT

## 2019-12-06 PROCEDURE — 83550 IRON BINDING TEST: CPT

## 2019-12-06 PROCEDURE — 83540 ASSAY OF IRON: CPT

## 2019-12-06 PROCEDURE — 84134 ASSAY OF PREALBUMIN: CPT

## 2019-12-06 PROCEDURE — 84425 ASSAY OF VITAMIN B-1: CPT

## 2019-12-06 PROCEDURE — 82306 VITAMIN D 25 HYDROXY: CPT

## 2019-12-10 LAB — VIT B1 BLD-MCNC: 120 NMOL/L (ref 70–180)

## 2019-12-13 ENCOUNTER — APPOINTMENT (OUTPATIENT)
Dept: MEDICAL GROUP | Facility: LAB | Age: 29
End: 2019-12-13
Payer: COMMERCIAL

## 2019-12-18 ENCOUNTER — NON-PROVIDER VISIT (OUTPATIENT)
Dept: NEUROLOGY | Facility: MEDICAL CENTER | Age: 29
End: 2019-12-18
Payer: COMMERCIAL

## 2019-12-18 DIAGNOSIS — R20.2 PARESTHESIAS: ICD-10-CM

## 2019-12-18 PROCEDURE — 95886 MUSC TEST DONE W/N TEST COMP: CPT | Performed by: PSYCHIATRY & NEUROLOGY

## 2019-12-18 PROCEDURE — 95912 NRV CNDJ TEST 11-12 STUDIES: CPT | Performed by: PSYCHIATRY & NEUROLOGY

## 2019-12-18 NOTE — PROCEDURES
"NERVE CONDUCTION STUDIES AND ELECTROMYOGRAPHY REPORT        Date of service: 12/18/2019.    Referring provider: Red Turner M.D.      SUMMARY OF PATIENT'S CLINICAL HISTORY, AND RATIONALE FOR TESTING:    Ms. Jaqui Kumar 29 y.o. presenting with widespread paresthesias.  Testing for possible polyneuropathy.    Past Medical History is significant for :   Past Medical History:   Diagnosis Date   • Anesthesia     \"Grandma coded in surgery, she was ok\"   • Arthritis     osteo-back and knees   • Pain 09/2017    back, knees, feet           ELECTRODIAGNOSTIC EXAMINATION:  Nerve conduction studies (NCS) and electromyography (EMG) are utilized to evaluate direct or indirect damage to the peripheral nervous system. NCS are performed to measure the nerve(s) response(s) to electrostimulation across a given nerve segment. EMG evaluates the passive and active electrical activity of the muscle(s) in question.  Muscles are innervated by specific peripheral nerves and roots. Often times, several nerves the muscle to be examined in order to determine the presence or absence of the disease process. Furthermore, nerves and muscles may need to be tested in a [id number] comparison, as well as in additional extremities, as this may be crucial in characterizing the extent of the disease process, which may be diffuse or isolated and of varying degree of severity. The extent of the neurodiagnostic exam is justified as it may help arrive to a proper diagnosis, which ultimately may contribute to better management of the patient. Therefore, the nerves to muscles examined during the study were medically necessary.    Unless otherwise noted, temperature of the extremity(s) study was monitored before and during the examination and remained between 32 and 36 degrees C for the upper extremities, and between 30 and 36 degrees C for the lower extremities.      NERVE CONDUCTION STUDIES:  Sensory nerves:   - Bilateral Median sensory " nerves were examined. Moderately decreased conduction velocities.  - Bilateral Ulnar sensory nerves were examined.  Mild to moderately decreased conduction velocities.  -Right sural nerve was tested. The response was within normal limits.    Motor nerves:   - Bilateral Median motor nerves were examined. Recording electrodes placed at the Abductor Pollicis Brevis muscles. The responses were within normal limits.  - Bilateral Ulnar motor nerves were examined. Recording electrodes placed at the Abductor Digiti Minimi muscles. The responses were within normal limits.  -Right tibial nerve was examined. Recording electrode placed at the Abductor Hallucis muscle. The responses were within normal limits.  -Right deep Peroneal motor nerve was examined. Recording electrode were placed at the Extensor Digitorum Brevis muscle.The responses were within normal limits.     No temporal dispersion or conduction block observed in any of the motor nerves examined.    LATE RESPONSES:  F waves were assessed in the following nerves: Right median, right ulnar, right peroneal, right tibial.  The responses within normal limits for the patient's age.      ELECTROMYOGRAPHY:  The study was performed the concentric needle electrode. Fibrillation and fasciculation activity is graded by convention from none (0) to continuous (4+).  Needle electrode examination was performed in the following muscles: Right deltoid, right biceps, right triceps, right abductor pollicis brevis, right abductor digit he minimi.  The muscles tested demonstrated normal inserctional activity, normal motor unit morphology and recruitment. There were no elements suggestive of active denervation.    The patient tolerated testing well, without any complications.     Nerve Conduction Studies     Stim Site NR Onset (ms) Norm Onset (ms) O-P Amp (µV) Norm O-P Amp Site1 Site2 Delta-P (ms) Dist (cm) Armond (m/s) Norm Armond (m/s)   Left Median Anti Sensory (2nd Digit)  36.1°C   Wrist     2.4 <3.4 38.4 >20 Wrist 2nd Digit 3.4 14.0 *41 >50   Right Median Anti Sensory (2nd Digit)  36.1°C   Wrist    2.4 <3.4 34.9 >20 Wrist 2nd Digit 3.5 14.0 *40 >50   Right Sural Anti Sensory (Lat Mall)  36.5°C   Calf    2.4 <4.5 13.7 >5 Calf Lat Mall 3.2 14.0 44 >40   Left Ulnar Anti Sensory (5th Digit)  36.1°C   Wrist    2.6 <3.1 38.6 >12 Wrist 5th Digit 3.4 14.0 *41 >50   Right Ulnar Anti Sensory (5th Digit)  36.1°C   Wrist    2.4 <3.1 37.9 >12 Wrist 5th Digit 3.2 14.0 *44 >50        Stim Site NR Onset (ms) Norm Onset (ms) O-P Amp (mV) Norm O-P Amp Site1 Site2 Delta-0 (ms) Dist (cm) Armond (m/s) Norm Armond (m/s)   Left Median Motor (Abd Poll Brev)  36.1°C   Wrist    3.4 <3.9 16.6 >6 Elbow Wrist 4.8 28.0 58 >50   Elbow    8.2  15.2          Right Median Motor (Abd Poll Brev)  36.1°C   Wrist    3.5 <3.9 14.4 >6 Elbow Wrist 5.3 27.5 52 >50   Elbow    8.8  11.2          Right Peroneal EDB Motor (Ext Dig Brev)  36.5°C   Ankle    3.2 <5.5 6.3 >3.0 B Fib Ankle 7.3 34.0 47 >40   B Fib    10.5  6.1  Poplt B Fib 1.8 10.0 56    Poplt    12.3  6.1          Right Tibial Motor (Abd Torre Brev)  36.5°C   Ankle    3.0 <6 8.5 >8 Knee Ankle 9.9 44.0 44 >40   Knee    12.9  4.8          Left Ulnar Motor (Abd Dig Min)  36.1°C   Wrist    2.8 <3.1 14.1 >7 B Elbow Wrist 3.8 24.0 63 >50   B Elbow    6.6  13.6  A Elbow B Elbow 1.1 10.0 91    A Elbow    7.7  11.2          Right Ulnar Motor (Abd Dig Min)  36.1°C   Wrist    2.8 <3.1 14.0 >7 B Elbow Wrist 3.7 22.0 59 >50   B Elbow    6.5  13.7  A Elbow B Elbow 1.4 10.0 71    A Elbow    7.9  13.5            F Wave Studies     NR F-Lat (ms) Lat Norm (ms)   Right Median (Abd Poll Brev)  36.1°C      27.49 <31   Right Peroneal EDB (Ext Dig Brev)  36.5°C      50.88 <57   Right Tibial (Abd Hallucis)  36.5°C      54.87 <57   Right Ulnar (Abd Dig Min)  36.1°C      26.65 <32                                            Electromyography     Side Muscle Nerve Root Ins Act Fibs Psw Amp Dur Poly Recrt Int Pat Comment    Right Deltoid Axillary C5-6 Nml Nml Nml Nml Nml 0 Nml Nml    Right Biceps Musculocut C5-6 Nml Nml Nml Nml Nml 0 Nml Nml    Right Triceps Radial C6-7-8 Nml Nml Nml Nml Nml 0 Nml Nml    Right Abd Poll Brev Median C8-T1 Nml Nml Nml Nml Nml 0 Nml Nml    Right Abd Dig Min Ulnar C8-T1 Nml Nml Nml Nml Nml 0 Nml Nml            DIAGNOSTIC INTERPRETATION:   Extensive electrodiagnostic studies were performed to the upper extremities, and to the right lower extremity. The studies were abnormal.      DISCUSSION:   1)- bilateral, mild median neuropathies, not localizable, but likely at the wrist (carpal tunnel syndrome).    There is no electrodiagnostic suggestion for a widespread polyneuropathy.  This test does not assess for small fiber neuropathy.    The patient has been instructed to follow-up with Dr. Turner, for further care.    Margarito Stanley MD  Medical Director, Epilepsy and Neurodiagnostics.   Clinical  of Neurology [facility].   Diplomate in Neurology, Epilepsy, and Electrodiagnostic Medicine.   Office: [phone]  Fax: [phone]

## 2020-01-24 ENCOUNTER — OFFICE VISIT (OUTPATIENT)
Dept: MEDICAL GROUP | Facility: LAB | Age: 30
End: 2020-01-24
Payer: COMMERCIAL

## 2020-01-24 VITALS
OXYGEN SATURATION: 97 % | HEIGHT: 66 IN | BODY MASS INDEX: 34.87 KG/M2 | HEART RATE: 75 BPM | DIASTOLIC BLOOD PRESSURE: 74 MMHG | SYSTOLIC BLOOD PRESSURE: 108 MMHG | TEMPERATURE: 98.2 F | WEIGHT: 217 LBS | RESPIRATION RATE: 12 BRPM

## 2020-01-24 DIAGNOSIS — M25.50 POLYARTHRALGIA: ICD-10-CM

## 2020-01-24 DIAGNOSIS — G56.03 BILATERAL CARPAL TUNNEL SYNDROME: ICD-10-CM

## 2020-01-24 DIAGNOSIS — F33.9 RECURRENT MAJOR DEPRESSIVE DISORDER, REMISSION STATUS UNSPECIFIED (HCC): ICD-10-CM

## 2020-01-24 DIAGNOSIS — G56.21 ENTRAPMENT OF RIGHT ULNAR NERVE: ICD-10-CM

## 2020-01-24 PROBLEM — H47.20 OPTIC NERVE ATROPHY: Status: ACTIVE | Noted: 2020-01-24

## 2020-01-24 PROBLEM — E53.8 B12 DEFICIENCY: Status: RESOLVED | Noted: 2019-10-17 | Resolved: 2020-01-24

## 2020-01-24 PROBLEM — G56.20 ULNAR NERVE COMPRESSION: Status: ACTIVE | Noted: 2020-01-24

## 2020-01-24 PROCEDURE — 99204 OFFICE O/P NEW MOD 45 MIN: CPT | Performed by: FAMILY MEDICINE

## 2020-01-24 RX ORDER — CITALOPRAM 20 MG/1
20 TABLET ORAL DAILY
Qty: 30 TAB | Refills: 3 | Status: SHIPPED | OUTPATIENT
Start: 2020-01-24 | End: 2020-02-28

## 2020-01-24 ASSESSMENT — ENCOUNTER SYMPTOMS
WHEEZING: 0
DEPRESSION: 0
BLURRED VISION: 0
SHORTNESS OF BREATH: 0
ABDOMINAL PAIN: 0
PALPITATIONS: 0
DOUBLE VISION: 1
FEVER: 0
TINGLING: 1
VOMITING: 0
CHILLS: 0
MYALGIAS: 1
DIZZINESS: 0
NAUSEA: 0
SORE THROAT: 0
CONSTIPATION: 0
FOCAL WEAKNESS: 0
SENSORY CHANGE: 1
DIARRHEA: 0
HEADACHES: 1
COUGH: 0

## 2020-01-24 ASSESSMENT — PATIENT HEALTH QUESTIONNAIRE - PHQ9
5. POOR APPETITE OR OVEREATING: 1 - SEVERAL DAYS
SUM OF ALL RESPONSES TO PHQ QUESTIONS 1-9: 12
CLINICAL INTERPRETATION OF PHQ2 SCORE: 2

## 2020-01-24 NOTE — PROGRESS NOTES
"Jaqui Kumar is a 29 y.o. female here to establish care and discuss multiple complex medical issues.    HPI: Patient reports complex medical history with multiple current issues undergoing work-up and no unifying diagnosis.    Fibromyalgia, polyarthralgia, neuropathy  Following with neurology and ophthalmology. Symptoms include: bilateral lumbar radiculopathy, constant double vision, pain in all major joints, \"whole body feels on fire\".  Has undergone neurologic work-up for autoimmune causes, including MS.  Has had multiple brain MRIs, lumbar MRI, and C-spine MRI. Has been told it was likely fibromyalgia because she had multiple tender points. Hands stiff/swollen in morning and improve as days goes on.     Negative HERMILA in past.  Negative Sjogren's work-up.  No rheumatoid factor or CCP on file.    Hand pains  History of bilateral carpal tunnel.  But also had new pain to right hand and fourth and fifth fingers beginning in October. PT and ortho urgent care also suspect right ulnar nerve compression.  She also has subcutaneous lumps on the right arm that they are concerned could be involved.  Has right forearm/elbow MRI ordered.    Mood  Mother  in , but had issues with depression prior to this.  Has  that has been helpful  Was on Paxil 20 mg, would like to try something with less weight gain    Follows with [facility], Neurology, Ob/gyn, ophthalmology..      Current medicines (including changes today)  Current Outpatient Medications   Medication Sig Dispense Refill   • cyclobenzaprine (FLEXERIL) 5 MG tablet Take 1-2 Tabs by mouth 3 times a day as needed. 30 Tab 0   • lidocaine (LIDODERM) 5 % Patch Apply 1 Patch to skin as directed every 24 hours. 10 Patch 0   • acetaminophen (TYLENOL) 500 MG Tab Take 1,000 mg by mouth every 6 hours as needed for Moderate Pain.     • vitamin D, Ergocalciferol, (DRISDOL) 49112 units Cap capsule Take 50,000 Units by mouth every 7 days. On Thur  0   • " celecoxib (CELEBREX) 100 MG Cap Take 2 Caps by mouth every day. 60 Cap 1   • omeprazole (PRILOSEC) 20 MG delayed-release capsule Take 20 mg by mouth every day.  0     No current facility-administered medications for this visit.      She  has a past medical history of Anesthesia, Anxiety, Arthritis, Carpal tunnel syndrome, Depression, Migraine, and Pain (09/2017). She also has no past medical history of Clotting disorder (HCC).  She  has a past surgical history that includes other (2011); gastric sleeve laparoscopy (N/A, 9/22/2017); liver biopsy laparoscopic (N/A, 9/22/2017); abdominal exploration; eye surgery; and hernia repair.  Social History     Tobacco Use   • Smoking status: Never Smoker   • Smokeless tobacco: Never Used   Substance Use Topics   • Alcohol use: Yes     Frequency: Monthly or less     Drinks per session: 1 or 2     Binge frequency: Never   • Drug use: No     Social History     Patient does not qualify to have social determinant information on file (likely too young).   Social History Narrative   • Not on file     Family History   Problem Relation Age of Onset   • Cancer Mother    • Arthritis Mother    • Arthritis Maternal Grandmother    • Diabetes Maternal Grandmother    • Hypertension Maternal Grandmother    • Arthritis Maternal Grandfather    • Diabetes Maternal Grandfather      Family Status   Relation Name Status   • Mo  (Not Specified)   • MGMo  (Not Specified)   • MGFa  (Not Specified)       ROS  Review of Systems   Constitutional: Negative for chills and fever.   HENT: Negative for congestion and sore throat.    Eyes: Positive for double vision. Negative for blurred vision.   Respiratory: Negative for cough, shortness of breath and wheezing.    Cardiovascular: Negative for chest pain and palpitations.   Gastrointestinal: Negative for abdominal pain, constipation, diarrhea, nausea and vomiting.        Swallowing difficulty with water only but no other liquids   Genitourinary: Negative for  "dysuria and urgency.   Musculoskeletal: Positive for joint pain and myalgias.   Skin: Negative for rash.   Neurological: Positive for tingling, sensory change and headaches. Negative for dizziness and focal weakness.   Psychiatric/Behavioral: Negative for depression.   All other systems reviewed and are negative.        Objective:     Physical Exam:  /74 (BP Location: Right arm, Patient Position: Sitting, BP Cuff Size: Large adult)   Pulse 75   Temp 36.8 °C (98.2 °F)   Resp 12   Ht 1.676 m (5' 6\")   Wt 98.4 kg (217 lb)   SpO2 97%  Body mass index is 35.02 kg/m².  Constitutional: Alert. Well appearing. No distress.  Skin: Warm, dry, good turgor, no visible rashes.  There are multiple <1cm non-tender, subcutaneous nodules on the right forearm  Eye: Equal, round and reactive to light, conjunctiva clear, lids normal.  ENMT: Moist mucous membranes. Normal dentition. Oropharynx clear.  Neck: Trachea midline, no masses, no thyromegaly. No cervical or supraclavicular lymphadenopathy.  Respiratory: Normal effort. Lungs are clear to auscultation bilaterally.  Cardiovascular: Regular rate and rhythm. Normal S1/S2. No murmurs, rubs or gallops.   Abdomen: Soft, non-tender, non-distended. No masses, no hepatosplenomegaly.  MSK: Pain in fourth and fifth fingers with compression and cubital tunnel.  Positive Tinel's sign bilaterally.  Neuro: Moves all four extremities. No facial droop.  Psych: Answers questions appropriately. Normal affect and mood.    Assessment and Plan:   1. Bilateral carpal tunnel syndrome  2. Entrapment of right ulnar nerve  Chronic carpal tunnel symptoms with EMG consistent with median nerve compression at the wrist.  She was following with hand therapy and orthopedic urgent care.  They recommended referral to hand surgery.  She does have right forearm/elbow MRI pending, ordered by orthopedics.  - REFERRAL TO HAND SURGERY    3. Recurrent major depressive disorder, remission status unspecified " (HCC)  Chronic issue.  Did improve with Paxil in the past but would like something with less weight gain.  Starting Celexa today.  She does have a  who has been helpful.  Follow-up in 1 month.  - citalopram (CELEXA) 20 MG Tab; Take 1 Tab by mouth every day.  Dispense: 30 Tab; Refill: 3    4. Polyarthralgia  Multiple chronic symptoms of unknown origin including: Polyarthralgia, multiple neurologic symptoms, and difficulty swallowing water but no other liquids.  Reports that diagnosis of fibromyalgia has been suspected.  She is following with neurology and has had multiple upper endoscopies without abnormality. Has undergone neurologic work-up for infectious and autoimmune causes. Has had multiple brain MRIs, lumbar MRI, and C-spine MRI. Has not seen rheumatology.  We will check inflammatory markers, thyroid, and a few other autoimmune tests that do not appear to been done yet.  If this is fibromyalgia, may see improvement with SSRI.  Likely rheumatology referral in future.  Close follow-up.  - TSH WITH REFLEX TO FT4; Future  - RHEUMATOID ARTHRITIS FACTOR; Future  - Sed Rate; Future  - CRP QUANTITIVE (NON-CARDIAC); Future  - CCP ANTIBODY; Future    Follow up: Return in about 1 month (around 2/24/2020).         PLEASE NOTE: This note was created using voice recognition software.

## 2020-02-07 ENCOUNTER — HOSPITAL ENCOUNTER (OUTPATIENT)
Dept: LAB | Facility: MEDICAL CENTER | Age: 30
End: 2020-02-07
Attending: FAMILY MEDICINE
Payer: COMMERCIAL

## 2020-02-07 DIAGNOSIS — M25.50 POLYARTHRALGIA: ICD-10-CM

## 2020-02-07 LAB
CRP SERPL HS-MCNC: 0.09 MG/DL (ref 0–0.75)
ERYTHROCYTE [SEDIMENTATION RATE] IN BLOOD BY WESTERGREN METHOD: 10 MM/HOUR (ref 0–20)
RHEUMATOID FACT SER IA-ACNC: <10 IU/ML (ref 0–14)
TSH SERPL DL<=0.005 MIU/L-ACNC: 2.17 UIU/ML (ref 0.38–5.33)

## 2020-02-07 PROCEDURE — 86140 C-REACTIVE PROTEIN: CPT

## 2020-02-07 PROCEDURE — 84443 ASSAY THYROID STIM HORMONE: CPT

## 2020-02-07 PROCEDURE — 36415 COLL VENOUS BLD VENIPUNCTURE: CPT

## 2020-02-07 PROCEDURE — 86431 RHEUMATOID FACTOR QUANT: CPT

## 2020-02-07 PROCEDURE — 85652 RBC SED RATE AUTOMATED: CPT

## 2020-02-07 PROCEDURE — 86200 CCP ANTIBODY: CPT

## 2020-02-09 LAB — CCP IGG SERPL-ACNC: 11 UNITS (ref 0–19)

## 2020-02-28 ENCOUNTER — OFFICE VISIT (OUTPATIENT)
Dept: MEDICAL GROUP | Facility: LAB | Age: 30
End: 2020-02-28
Payer: COMMERCIAL

## 2020-02-28 VITALS
SYSTOLIC BLOOD PRESSURE: 116 MMHG | HEIGHT: 66 IN | BODY MASS INDEX: 34.23 KG/M2 | RESPIRATION RATE: 12 BRPM | HEART RATE: 82 BPM | OXYGEN SATURATION: 98 % | TEMPERATURE: 97.8 F | DIASTOLIC BLOOD PRESSURE: 80 MMHG | WEIGHT: 213 LBS

## 2020-02-28 DIAGNOSIS — M67.89 EXTENSOR TENDON DISRUPTION: ICD-10-CM

## 2020-02-28 DIAGNOSIS — M54.16 BILATERAL LUMBAR RADICULOPATHY: ICD-10-CM

## 2020-02-28 DIAGNOSIS — F33.9 RECURRENT MAJOR DEPRESSIVE DISORDER, REMISSION STATUS UNSPECIFIED (HCC): ICD-10-CM

## 2020-02-28 DIAGNOSIS — G56.21 ENTRAPMENT OF RIGHT ULNAR NERVE: ICD-10-CM

## 2020-02-28 PROCEDURE — 99214 OFFICE O/P EST MOD 30 MIN: CPT | Performed by: FAMILY MEDICINE

## 2020-02-28 RX ORDER — CITALOPRAM 40 MG/1
40 TABLET ORAL DAILY
Qty: 30 TAB | Refills: 5 | Status: SHIPPED | OUTPATIENT
Start: 2020-02-28 | End: 2020-07-17 | Stop reason: SDUPTHER

## 2020-02-28 ASSESSMENT — ENCOUNTER SYMPTOMS
CONSTIPATION: 0
ABDOMINAL PAIN: 0
DEPRESSION: 1
VOMITING: 0
FEVER: 0
FOCAL WEAKNESS: 0
COUGH: 0
PALPITATIONS: 0
CHILLS: 0
BLURRED VISION: 0
DIARRHEA: 0
HEADACHES: 0
SORE THROAT: 0
NERVOUS/ANXIOUS: 1
SHORTNESS OF BREATH: 0
DIZZINESS: 0
WHEEZING: 0
MYALGIAS: 1
HEARTBURN: 1
NAUSEA: 1

## 2020-02-28 NOTE — PROGRESS NOTES
"Subjective:     CC: Follow-up multiple conditions    HPI:   Jaqui presents today to follow-up on depression, right arm extensor tendon tear, and chronic bilateral leg numbness.    Mood  Significantly improved with Celexa, work has been stressful but this has helped. Mood \"in the middle\" but \"feels happy\". She will notice mood down when misses dose. She would like to try higher dose.  She does have some mild nausea and heartburn but this is more related to her Celebrex.  No thoughts of self-harm.     Right arm  Right common extensor tendon - 80% torn, seen on MRI.  Following with hand surgery, trying brace prior to surgery.  She would like to get surgery soon if possible as it is very difficult to do things around the house and at work without using right arm.    Bilateral leg numbness  Chronic issue.  See prior notes, has undergone extensive work-up with neurology without obvious cause.  This is currently slightly worsened but reports not much above her usual.  Follow up with neurology in April.  Follow up with pain management in March, reports they may try to put her into drug study.    Health Maintenance: Completed    Medications, past medical history, allergies, and social history have been reviewed and updated.    ROS:  Review of Systems   Constitutional: Negative for chills and fever.   HENT: Negative for congestion and sore throat.    Eyes: Negative for blurred vision.   Respiratory: Negative for cough, shortness of breath and wheezing.    Cardiovascular: Negative for chest pain and palpitations.   Gastrointestinal: Positive for heartburn and nausea. Negative for abdominal pain, constipation, diarrhea and vomiting.   Genitourinary: Negative for dysuria and urgency.   Musculoskeletal: Positive for joint pain and myalgias.   Skin: Negative for rash.   Neurological: Negative for dizziness, focal weakness and headaches.   Psychiatric/Behavioral: Positive for depression. The patient is nervous/anxious.    All other " "systems reviewed and are negative.         Objective:       Exam:  /80 (BP Location: Left arm, Patient Position: Sitting, BP Cuff Size: Adult)   Pulse 82   Temp 36.6 °C (97.8 °F)   Resp 12   Ht 1.676 m (5' 6\")   Wt 96.6 kg (213 lb)   SpO2 98%   BMI 34.38 kg/m²  Body mass index is 34.38 kg/m².    Constitutional: Alert. Well appearing. No distress.  Skin: Warm, dry, good turgor, no visible rashes.  Eye: Equal, round and reactive to light, conjunctiva clear, lids normal.  ENMT: Moist mucous membranes. Normal dentition.  Respiratory: Normal effort. Lungs are clear to auscultation bilaterally.  Cardiovascular: Regular rate and rhythm. Normal S1/S2. No murmurs, rubs or gallops.   Neuro: Moves all four extremities. No facial droop.  Psych: Answers questions appropriately. Normal affect and mood.    Assessment & Plan:     30 y.o. female with the following -     1. Recurrent major depressive disorder, remission status unspecified (HCC)  Chronic issue.  Mood significantly improved with starting Celexa 20 mg 1 month ago and she is tolerating this well.  She would like to try increased dose will increase to 40 mg daily.  Discussed return precautions.  - citalopram (CELEXA) 40 MG Tab; Take 1 Tab by mouth every day.  Dispense: 30 Tab; Refill: 5    2. Bilateral lumbar radiculopathy  Chronic issue.  Currently with continued chronic numbness to both legs. See prior notes, has undergone extensive work-up with neurology without obvious cause.  She has follow-up scheduled with neurology in April.  We did discuss that we could consider rheumatology referral in the future given her extensive unexplained symptoms.  However, we will wait for further neurology recommendations at this point.    3. Extensor tendon disruption  4. Entrapment of right ulnar nerve  Right common extensor tendon - 80% torn, seen on MRI. Following with hand surgery, trying brace prior to surgery.      Please note that this note was created using voice " recognition software.

## 2020-04-03 ENCOUNTER — TELEPHONE (OUTPATIENT)
Dept: NEUROLOGY | Facility: MEDICAL CENTER | Age: 30
End: 2020-04-03

## 2020-04-03 ENCOUNTER — OFFICE VISIT (OUTPATIENT)
Dept: NEUROLOGY | Facility: MEDICAL CENTER | Age: 30
End: 2020-04-03
Payer: COMMERCIAL

## 2020-04-03 VITALS
TEMPERATURE: 98.9 F | SYSTOLIC BLOOD PRESSURE: 110 MMHG | HEART RATE: 68 BPM | WEIGHT: 218.03 LBS | HEIGHT: 66 IN | OXYGEN SATURATION: 100 % | BODY MASS INDEX: 35.04 KG/M2 | DIASTOLIC BLOOD PRESSURE: 70 MMHG

## 2020-04-03 DIAGNOSIS — G56.03 BILATERAL CARPAL TUNNEL SYNDROME: ICD-10-CM

## 2020-04-03 DIAGNOSIS — R20.2 PARESTHESIAS: Primary | ICD-10-CM

## 2020-04-03 PROCEDURE — 99213 OFFICE O/P EST LOW 20 MIN: CPT | Performed by: PSYCHIATRY & NEUROLOGY

## 2020-04-03 RX ORDER — PREGABALIN 100 MG/1
100 CAPSULE ORAL 2 TIMES DAILY
Qty: 60 CAP | Refills: 5 | Status: SHIPPED | OUTPATIENT
Start: 2020-04-03 | End: 2020-04-10

## 2020-04-03 ASSESSMENT — ENCOUNTER SYMPTOMS
SENSORY CHANGE: 1
MYALGIAS: 1
FOCAL WEAKNESS: 0
WEAKNESS: 0
TINGLING: 1

## 2020-04-03 ASSESSMENT — FIBROSIS 4 INDEX: FIB4 SCORE: 0.51

## 2020-04-03 NOTE — TELEPHONE ENCOUNTER
I faxed the Patients Medication to the [facility] [address] [place] [address].          pregabalin (LYRICA) 100 MG Cap 60 Cap 5/5 4/3/2020 10/3/2020    Sig - Route: Take 1 Cap by mouth 2 times a day for 183 days. - Oral    Class: Print Rx Paper

## 2020-04-04 NOTE — PROGRESS NOTES
Subjective:      Jaqui Kumar is a 30 y.o. female who presents for follow-up, with a history of diffuse and fluctuating paresthesias, generalized pain, now status post EMG/NCV studies.     HPI    Jaqui states that her symptoms have continued, now also involving the left lower extremity as much as the right.  Constant issue, they never resolved completely, can worsen as the day goes on, in the lower extremities, especially with weightbearing.  There is no weakness, she is not falling, but the discomfort is noticeable.  In the upper extremities, symptoms can extend from the hands up to the forearms, similar quality, just less intense.    EMG/NCV studies of both upper extremities and a lower extremity reveals no evidence of a polyneuropathy or myopathic process, there was mild bilateral median compression at the wrists consistent with CTS.  Blood work for autoimmune parameters, paraneoplastic markers, viral markers, paraproteinemia, vitamin levels, etc., were all negative/normal.    For treatment, we did start Lyrica 50 mg, 3 times daily, she actually had a very good benefit, she thinks symptoms overall diminished by about 50%, but then got into an argument with her pharmacy about obtaining the drug.  Even with our office behind her, she has been off the drug.    Medical, surgical and family histories are reviewed, there are no new drug allergies.  She did suffer from a rather severe tendon tear in the right upper extremity at the elbow.  From my standpoint, she is on no medications, she does remain on Celexa, Lidoderm patch, Flexeril and Prilosec.    Review of Systems   Constitutional: Negative for malaise/fatigue.   Musculoskeletal: Positive for myalgias.   Neurological: Positive for tingling and sensory change. Negative for focal weakness and weakness.   All other systems reviewed and are negative.       Objective:     /70 (BP Location: Left arm, Patient Position: Sitting, BP Cuff Size: Adult)    "Pulse 68   Temp 37.2 °C (98.9 °F) (Temporal)   Ht 1.676 m (5' 6\")   Wt 98.9 kg (218 lb 0.6 oz)   SpO2 100%   BMI 35.19 kg/m²      Physical Exam    She appears in no acute distress.  Her vital signs are stable.  There is no malar rash.  The neck is supple.  Cardiac evaluation reveals a regular rhythm.  There is no evidence of rash.  There is minimal tenderness at the wrist bilaterally, Tinel and Phalen signs are absent.    Mental status and cranial exams are grossly intact.  Musculoskeletal exam reveals normal tone, there is no tremor or drift.  Strength is intact throughout.  Reflexes are present throughout, none are dropped, they are symmetric.  Both toes are downgoing.    Repetitive movements and fine motor control are symmetric in the hands, fingers and feet.  She stands easily, gait is normal in station, there is no postural instability.  There is no appendicular dystaxia.    Sensory exam is intact to temperature and vibration.     Assessment/Plan:     1. Paresthesias  For now, symptomatic relief can be proffered, I think from a neurologic standpoint, we have ruled everything out that might be a cause for her symptoms.  I still suspect this will turn out to be fibromyalgia, nothing more.  Though her EMG/NCV studies were not sensitive to  a small fiber, sensory neuropathy which is what we were suspicious of, at this time all we can do is observe to see if things progress.    In the meantime, I will continue her Lyrica, I will increase to 100 mg, twice daily.  She can follow-up with her PCP, Dr. Zamudio, moving forwards, hopefully he will be comfortable continuing the Lyrica; there is no need for additional neurologic follow-up at this time.  Lyrica can be increased further if needed.  She can follow-up in the next couple of years if her symptoms continue to progress, and if she were to begin to show signs of real weakness, sensory loss, etc.    - pregabalin (LYRICA) 100 MG Cap; Take 1 Cap by " mouth 2 times a day for 183 days.  Dispense: 60 Cap; Refill: 5    2. Bilateral carpal tunnel syndrome  This is mild, symptomatic relief and conservative treatments are all that would be required.    Time: 20-minute spent face-to-face for exam, review, discussion, and education, of this over 50% of the time spent counseling and coordinating care.

## 2020-04-28 ENCOUNTER — TELEPHONE (OUTPATIENT)
Dept: NEUROLOGY | Facility: MEDICAL CENTER | Age: 30
End: 2020-04-28

## 2020-04-28 NOTE — TELEPHONE ENCOUNTER
----- Message from Jaqui Kumar sent at 4/27/2020  4:11 PM PDT -----  Regarding: Non-Urgent Medical Question  Contact: [phone]  Okay, since you guys are the only doc that seems to answer.     So I've been on the lyrica for the two weeks and I have headaches/fog daily to the point I have to leave work. The nerve pain is the same. I'm like extra tired. It's weird. I can take a 3 hour nap and I'm still tired. Extra sleep and all.     Is this shit normal? Cause for real. I'm over it.     Jaqui

## 2020-04-28 NOTE — TELEPHONE ENCOUNTER
Dr. Turner,    Can you answer this question or does she have to go with her PCP to get answers for there Lyrica?   She states we are the only one that answers her.   Please advise.

## 2020-04-29 ENCOUNTER — TELEPHONE (OUTPATIENT)
Dept: NEUROLOGY | Facility: MEDICAL CENTER | Age: 30
End: 2020-04-29

## 2020-04-29 NOTE — TELEPHONE ENCOUNTER
When I had last seen her, she was on Lyrica 50 mg, 3 times daily, it was helping her pain symptoms and was not giving her headaches.  Ask her if the headaches started only after we had increased the dose to her present 100 mg, 3 times daily.  If this is the case, she should go back to 50 mg, 3 times daily, but she does need to follow-up with her primary care physician, I have discharged her from care since I do not believe there was a neurologic cause for her symptoms.  Take a look at my last note, I did forward my note to her PCP, Dr. Zamudio.

## 2020-04-29 NOTE — TELEPHONE ENCOUNTER
----- Message from Jaqui Kumar sent at 4/28/2020  7:02 PM PDT -----  Regarding: RE: Non-Urgent Medical Question  Contact: [phone]  Are we sure I can't have the nightmare one? I felt no pain. Just nightmares. :-)   ----- Message -----  From: Rock Ibarra, Med Ass't  Sent: 4/28/20, 8:42 AM  To: Jaqui Kumar  Subject: RE: Non-Urgent Medical Question    Hello Jaqui,    I have forwarded your message to Dr. Turner.       ----- Message -----       From:Jaqui Kumar       Sent:4/27/2020  4:11 PM PDT         To:Red Turner M.D.    Subject:Non-Urgent Medical Question    Okay, since you guys are the only doc that seems to answer.     So I've been on the lyrica for the two weeks and I have headaches/fog daily to the point I have to leave work. The nerve pain is the same. I'm like extra tired. It's weird. I can take a 3 hour nap and I'm still tired. Extra sleep and all.     Is this shit normal? Cause for real. I'm over it.     Jaqui       - - - DISCLAIMER - - -  [email or web address][This message may contain formatting that cannot be shown on this site.]

## 2020-04-30 NOTE — TELEPHONE ENCOUNTER
Spoke with pt she is still having numbness in her right leg, she stated her toes have tingling inside them. She is scheduled for May 6.

## 2020-05-06 ENCOUNTER — TELEMEDICINE (OUTPATIENT)
Dept: MEDICAL GROUP | Facility: LAB | Age: 30
End: 2020-05-06
Payer: COMMERCIAL

## 2020-05-06 VITALS — HEART RATE: 72 BPM | TEMPERATURE: 98.7 F | RESPIRATION RATE: 12 BRPM | BODY MASS INDEX: 35.19 KG/M2 | HEIGHT: 66 IN

## 2020-05-06 DIAGNOSIS — F33.9 RECURRENT MAJOR DEPRESSIVE DISORDER, REMISSION STATUS UNSPECIFIED (HCC): ICD-10-CM

## 2020-05-06 DIAGNOSIS — R20.2 RIGHT LEG PARESTHESIAS: ICD-10-CM

## 2020-05-06 DIAGNOSIS — M67.89 EXTENSOR TENDON DISRUPTION: ICD-10-CM

## 2020-05-06 PROCEDURE — 99214 OFFICE O/P EST MOD 30 MIN: CPT | Mod: 95,CR | Performed by: FAMILY MEDICINE

## 2020-05-06 NOTE — PROGRESS NOTES
"Telemedicine Visit: Established Patient     This encounter was conducted via Zoom .   Verbal consent was obtained. Patient's identity was verified.    Subjective:   CC: Follow-up right leg pain and paresthesia.    Jaqui Kumar is a 30 y.o. female presenting for evaluation and management of:    Right leg pain and paresthesias.  Chronic issue.  She underwent extensive work-up with neurology and seemed to have ruled out everything that might of been a cause for her symptoms.  Fibromyalgia remains a possible diagnosis.  Currently has pain in right hip to outer leg, \"Feels like it is on fire\", numbness in foot, can't put weight on right foot.  Was having really bad headaches with lyrica, mental fogginess as well.  Did not tolerate gabapentin.  Nortriptyline helped with pain but gave her nightmares  Pain management did epidural last week, no changes in symptoms since then They are discussing nerve ablation or spinal stimulator.  She may go into drug study at [facility].    Right common extensor tendon rupture - scheduled for surgery May 14th    Mood - good days and bad days.  Reports overall stable.  No concerns for self-harm.    ROS   Denies any recent fevers or chills. No nausea or vomiting. No chest pains or shortness of breath.     Allergies   Allergen Reactions   • Lyrica      Severe headaches       Current medicines (including changes today)  Current Outpatient Medications   Medication Sig Dispense Refill   • citalopram (CELEXA) 40 MG Tab Take 1 Tab by mouth every day. 30 Tab 5   • cyclobenzaprine (FLEXERIL) 5 MG tablet Take 1-2 Tabs by mouth 3 times a day as needed. 30 Tab 0   • lidocaine (LIDODERM) 5 % Patch Apply 1 Patch to skin as directed every 24 hours. 10 Patch 0   • acetaminophen (TYLENOL) 500 MG Tab Take 1,000 mg by mouth every 6 hours as needed for Moderate Pain.     • vitamin D, Ergocalciferol, (DRISDOL) 51863 units Cap capsule Take 50,000 Units by mouth every 7 days. On Thur  0   • omeprazole " "(PRILOSEC) 20 MG delayed-release capsule Take 20 mg by mouth every day.  0   • pregabalin (LYRICA) 100 MG Cap Take 1 Cap by mouth 2 times a day for 30 days. 60 Cap 3     No current facility-administered medications for this visit.        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 She also has no past medical history of Clotting disorder (HCC).  She  has a past surgical history that includes other (2011); gastric sleeve laparoscopy (N/A, 9/22/2017); liver biopsy laparoscopic (N/A, 9/22/2017); abdominal exploration; eye surgery; and hernia repair.       Objective:   Vitals obtained by patient:  Pulse 72 Comment: Verbal  Temp 37.1 °C (98.7 °F) Comment: Verbal  Resp 12   Ht 1.676 m (5' 6\") Comment: Verbal  BMI 35.19 kg/m²       Physical Exam:  Constitutional: Alert, no distress, well-groomed.  Skin: No rashes in visible areas.  Eye: Round. Conjunctiva clear, lids normal. No icterus.   ENMT: Lips pink without lesions, good dentition, moist mucous membranes. Phonation normal.  Neck: No masses, no thyromegaly. Moves freely without pain.  CV: Pulse as reported by patient  Respiratory: Unlabored respiratory effort, no cough or audible wheeze  Psych: Alert and oriented x3, normal affect and mood.       Assessment and Plan:   The following treatment plan was " discussed:     1. Right leg paresthesias  Chronic issue.  She underwent extensive work-up with neurology and seemed to have ruled out everything that might of been a cause for her symptoms.  Fibromyalgia remains a possible diagnosis. Pain management did epidural last week, no changes in symptoms since then. They are discussing nerve ablation or spinal stimulator.  She did not tolerate Lyrica or gabapentin.  She did think nortriptyline helped with the pain in the past, although she did get nightmares at a higher dosage.  She would like to try this again and I think this is reasonable.  She has left over nortriptyline at home and will start at 10 to 25 mg nightly and let us know if this helps.  We could also consider trying Lyrica again at 50 mg 3 times daily in the future.  She will continue to follow-up with pain management.  If pain management management does not have any further treatments to offer we could consider referral to rheumatology.    2. Extensor tendon disruption  She has surgery scheduled May 14.    3. Recurrent major depressive disorder, remission status unspecified (HCC)  Chronic issue, stable.  Continue Celexa.      Follow-up: Return if symptoms worsen or fail to improve.

## 2020-05-29 ENCOUNTER — PATIENT MESSAGE (OUTPATIENT)
Dept: MEDICAL GROUP | Facility: LAB | Age: 30
End: 2020-05-29

## 2020-05-29 DIAGNOSIS — M25.50 POLYARTHRALGIA: ICD-10-CM

## 2020-05-29 DIAGNOSIS — R20.2 RIGHT LEG PARESTHESIAS: ICD-10-CM

## 2020-07-14 ENCOUNTER — HOSPITAL ENCOUNTER (OUTPATIENT)
Dept: LAB | Facility: MEDICAL CENTER | Age: 30
End: 2020-07-14
Attending: NURSE PRACTITIONER
Payer: COMMERCIAL

## 2020-07-14 ENCOUNTER — HOSPITAL ENCOUNTER (OUTPATIENT)
Dept: LAB | Facility: MEDICAL CENTER | Age: 30
End: 2020-07-14
Attending: PHYSICIAN ASSISTANT
Payer: COMMERCIAL

## 2020-07-14 LAB
25(OH)D3 SERPL-MCNC: 23 NG/ML (ref 30–100)
ALBUMIN SERPL BCP-MCNC: 3.8 G/DL (ref 3.2–4.9)
ALBUMIN SERPL BCP-MCNC: 3.8 G/DL (ref 3.2–4.9)
ALBUMIN/GLOB SERPL: 1.3 G/DL
ALBUMIN/GLOB SERPL: 1.4 G/DL
ALP SERPL-CCNC: 59 U/L (ref 30–99)
ALP SERPL-CCNC: 60 U/L (ref 30–99)
ALT SERPL-CCNC: 7 U/L (ref 2–50)
ALT SERPL-CCNC: 9 U/L (ref 2–50)
ANION GAP SERPL CALC-SCNC: 10 MMOL/L (ref 7–16)
ANION GAP SERPL CALC-SCNC: 12 MMOL/L (ref 7–16)
AST SERPL-CCNC: 11 U/L (ref 12–45)
AST SERPL-CCNC: 9 U/L (ref 12–45)
BASOPHILS # BLD AUTO: 0.6 % (ref 0–1.8)
BASOPHILS # BLD: 0.03 K/UL (ref 0–0.12)
BILIRUB SERPL-MCNC: 0.3 MG/DL (ref 0.1–1.5)
BILIRUB SERPL-MCNC: 0.3 MG/DL (ref 0.1–1.5)
BUN SERPL-MCNC: 23 MG/DL (ref 8–22)
BUN SERPL-MCNC: 23 MG/DL (ref 8–22)
CALCIUM SERPL-MCNC: 8.8 MG/DL (ref 8.5–10.5)
CALCIUM SERPL-MCNC: 8.8 MG/DL (ref 8.5–10.5)
CHLORIDE SERPL-SCNC: 103 MMOL/L (ref 96–112)
CHLORIDE SERPL-SCNC: 105 MMOL/L (ref 96–112)
CHOLEST SERPL-MCNC: 150 MG/DL (ref 100–199)
CO2 SERPL-SCNC: 23 MMOL/L (ref 20–33)
CO2 SERPL-SCNC: 23 MMOL/L (ref 20–33)
CREAT SERPL-MCNC: 0.61 MG/DL (ref 0.5–1.4)
CREAT SERPL-MCNC: 0.61 MG/DL (ref 0.5–1.4)
EOSINOPHIL # BLD AUTO: 0.1 K/UL (ref 0–0.51)
EOSINOPHIL NFR BLD: 1.9 % (ref 0–6.9)
ERYTHROCYTE [DISTWIDTH] IN BLOOD BY AUTOMATED COUNT: 40.6 FL (ref 35.9–50)
FASTING STATUS PATIENT QL REPORTED: NORMAL
FOLATE SERPL-MCNC: 4.3 NG/ML
GLOBULIN SER CALC-MCNC: 2.8 G/DL (ref 1.9–3.5)
GLOBULIN SER CALC-MCNC: 3 G/DL (ref 1.9–3.5)
GLUCOSE SERPL-MCNC: 87 MG/DL (ref 65–99)
GLUCOSE SERPL-MCNC: 89 MG/DL (ref 65–99)
HCT VFR BLD AUTO: 37.8 % (ref 37–47)
HDLC SERPL-MCNC: 61 MG/DL
HGB BLD-MCNC: 12.7 G/DL (ref 12–16)
IMM GRANULOCYTES # BLD AUTO: 0.04 K/UL (ref 0–0.11)
IMM GRANULOCYTES NFR BLD AUTO: 0.7 % (ref 0–0.9)
IRON SATN MFR SERPL: 47 % (ref 15–55)
IRON SERPL-MCNC: 123 UG/DL (ref 40–170)
LDLC SERPL CALC-MCNC: 73 MG/DL
LYMPHOCYTES # BLD AUTO: 2.19 K/UL (ref 1–4.8)
LYMPHOCYTES NFR BLD: 41 % (ref 22–41)
MCH RBC QN AUTO: 29.7 PG (ref 27–33)
MCHC RBC AUTO-ENTMCNC: 33.6 G/DL (ref 33.6–35)
MCV RBC AUTO: 88.5 FL (ref 81.4–97.8)
MONOCYTES # BLD AUTO: 0.35 K/UL (ref 0–0.85)
MONOCYTES NFR BLD AUTO: 6.6 % (ref 0–13.4)
NEUTROPHILS # BLD AUTO: 2.63 K/UL (ref 2–7.15)
NEUTROPHILS NFR BLD: 49.2 % (ref 44–72)
NRBC # BLD AUTO: 0 K/UL
NRBC BLD-RTO: 0 /100 WBC
PLATELET # BLD AUTO: 269 K/UL (ref 164–446)
PMV BLD AUTO: 10.3 FL (ref 9–12.9)
POTASSIUM SERPL-SCNC: 3.8 MMOL/L (ref 3.6–5.5)
POTASSIUM SERPL-SCNC: 3.8 MMOL/L (ref 3.6–5.5)
PREALB SERPL-MCNC: 22.4 MG/DL (ref 18–38)
PROT SERPL-MCNC: 6.6 G/DL (ref 6–8.2)
PROT SERPL-MCNC: 6.8 G/DL (ref 6–8.2)
RBC # BLD AUTO: 4.27 M/UL (ref 4.2–5.4)
SODIUM SERPL-SCNC: 138 MMOL/L (ref 135–145)
SODIUM SERPL-SCNC: 138 MMOL/L (ref 135–145)
TIBC SERPL-MCNC: 263 UG/DL (ref 250–450)
TRANSFERRIN SERPL-MCNC: 211 MG/DL (ref 200–370)
TRIGL SERPL-MCNC: 80 MG/DL (ref 0–149)
UIBC SERPL-MCNC: 140 UG/DL (ref 110–370)
VIT B12 SERPL-MCNC: 268 PG/ML (ref 211–911)
WBC # BLD AUTO: 5.3 K/UL (ref 4.8–10.8)

## 2020-07-14 PROCEDURE — 82607 VITAMIN B-12: CPT

## 2020-07-14 PROCEDURE — 84252 ASSAY OF VITAMIN B-2: CPT

## 2020-07-14 PROCEDURE — 82746 ASSAY OF FOLIC ACID SERUM: CPT

## 2020-07-14 PROCEDURE — 82306 VITAMIN D 25 HYDROXY: CPT

## 2020-07-14 PROCEDURE — 85025 COMPLETE CBC W/AUTO DIFF WBC: CPT

## 2020-07-14 PROCEDURE — 80061 LIPID PANEL: CPT

## 2020-07-14 PROCEDURE — 83550 IRON BINDING TEST: CPT

## 2020-07-14 PROCEDURE — 80053 COMPREHEN METABOLIC PANEL: CPT

## 2020-07-14 PROCEDURE — 80053 COMPREHEN METABOLIC PANEL: CPT | Mod: 91

## 2020-07-14 PROCEDURE — 84466 ASSAY OF TRANSFERRIN: CPT

## 2020-07-14 PROCEDURE — 84207 ASSAY OF VITAMIN B-6: CPT

## 2020-07-14 PROCEDURE — 84425 ASSAY OF VITAMIN B-1: CPT

## 2020-07-14 PROCEDURE — 84134 ASSAY OF PREALBUMIN: CPT

## 2020-07-14 PROCEDURE — 83540 ASSAY OF IRON: CPT

## 2020-07-14 PROCEDURE — 36415 COLL VENOUS BLD VENIPUNCTURE: CPT

## 2020-07-17 ENCOUNTER — OFFICE VISIT (OUTPATIENT)
Dept: MEDICAL GROUP | Facility: LAB | Age: 30
End: 2020-07-17
Payer: COMMERCIAL

## 2020-07-17 VITALS
HEIGHT: 66 IN | RESPIRATION RATE: 12 BRPM | TEMPERATURE: 98.7 F | WEIGHT: 223 LBS | BODY MASS INDEX: 35.84 KG/M2 | HEART RATE: 80 BPM | SYSTOLIC BLOOD PRESSURE: 108 MMHG | OXYGEN SATURATION: 97 % | DIASTOLIC BLOOD PRESSURE: 80 MMHG

## 2020-07-17 DIAGNOSIS — F33.9 RECURRENT MAJOR DEPRESSIVE DISORDER, REMISSION STATUS UNSPECIFIED (HCC): ICD-10-CM

## 2020-07-17 DIAGNOSIS — E66.01 MORBID OBESITY (HCC): ICD-10-CM

## 2020-07-17 DIAGNOSIS — E55.9 VITAMIN D DEFICIENCY: ICD-10-CM

## 2020-07-17 DIAGNOSIS — R20.2 RIGHT LEG PARESTHESIAS: ICD-10-CM

## 2020-07-17 LAB
VIT B1 BLD-MCNC: 125 NMOL/L (ref 70–180)
VIT B6 SERPL-MCNC: 28.4 NMOL/L (ref 20–125)

## 2020-07-17 PROCEDURE — 99214 OFFICE O/P EST MOD 30 MIN: CPT | Performed by: FAMILY MEDICINE

## 2020-07-17 RX ORDER — CITALOPRAM 40 MG/1
40 TABLET ORAL DAILY
Qty: 90 TAB | Refills: 3 | Status: SHIPPED | OUTPATIENT
Start: 2020-07-17 | End: 2021-07-30

## 2020-07-17 ASSESSMENT — ENCOUNTER SYMPTOMS
SORE THROAT: 0
SHORTNESS OF BREATH: 0
WHEEZING: 0
CHILLS: 0
COUGH: 0
FEVER: 0

## 2020-07-17 ASSESSMENT — FIBROSIS 4 INDEX: FIB4 SCORE: 0.44

## 2020-07-17 NOTE — PROGRESS NOTES
"Subjective:     CC: Follow up    HPI:   Jaqui presents today follow-up on:    Right leg paresthesias  Chronic issue.  See prior note for details.  Has undergone extensive work-up with neurology without cause.  Chronic burning pain to right leg and numbness.  Has not tolerated Lyrica, gabapentin, nortriptyline.  Following with pain management and they have tried epidurals.  She was recently starting into a trial for naltrexone through pain management,  she started this 2 weeks ago.  She has noted 10 lb weight gain in last 2 weeks due to the naltrexone increasing her appetite.    Depression  Chronic issue, stable.  She continues on Celexa and notes that this helps greatly, if she misses a day she notices decreased mood.  She would like this refilled today.    Vitamin D  New issue.  Recent labs with low vitamin D.    Health Maintenance: Completed    Medications, past medical history, allergies, and social history have been reviewed and updated.    ROS:  Review of Systems   Constitutional: Negative for chills and fever.   HENT: Negative for congestion and sore throat.    Respiratory: Negative for cough, shortness of breath and wheezing.    Cardiovascular: Negative for chest pain.      Objective:       Exam:  /80 (BP Location: Left arm, Patient Position: Sitting, BP Cuff Size: Large adult)   Pulse 80   Temp 37.1 °C (98.7 °F)   Resp 12   Ht 1.676 m (5' 6\")   Wt 101.2 kg (223 lb)   SpO2 97%   BMI 35.99 kg/m²  Body mass index is 35.99 kg/m².    Constitutional: Alert. Well appearing. No distress.  Skin: Warm, dry, good turgor, no visible rashes.  Eye: Equal, round and reactive to light, conjunctiva clear, lids normal.  ENMT: Moist mucous membranes.   Respiratory: Normal effort.   Neuro: Moves all four extremities. No facial droop.  Psych: Answers questions appropriately. Normal affect and mood.      Assessment & Plan:     30 y.o. female with the following -     1. Right leg paresthesias  Chronic issue with " unrevealing work-up for possible cause.  She is following with pain management and she has been started in a clinical trial for naltrexone.    2. Recurrent major depressive disorder, remission status unspecified (HCC)  Chronic issue, stable.  Celexa has been very beneficial and this is refilled.  - citalopram (CELEXA) 40 MG Tab; Take 1 Tab by mouth every day.  Dispense: 90 Tab; Refill: 3    3. Vitamin D deficiency  New issue.  Vitamin D at 23.  She will start replacement with 1000 units daily.    4. Morbid obesity (HCC)  Chronic issue, she has noted recent weight gain secondary to the naltrexone.  Also been tough due to gym being closed with COVID.  We discussed considering home exercises and healthy snacks.    Please note that this note was created using voice recognition software.

## 2020-07-18 LAB — VIT B2 SERPL-SCNC: 5 NMOL/L (ref 5–50)

## 2020-08-14 ENCOUNTER — APPOINTMENT (OUTPATIENT)
Dept: RADIOLOGY | Facility: MEDICAL CENTER | Age: 30
End: 2020-08-14
Attending: NURSE PRACTITIONER
Payer: COMMERCIAL

## 2020-08-14 DIAGNOSIS — M54.16 LUMBAR RADICULOPATHY: ICD-10-CM

## 2020-08-14 PROCEDURE — 72148 MRI LUMBAR SPINE W/O DYE: CPT

## 2020-08-14 PROCEDURE — 72100 X-RAY EXAM L-S SPINE 2/3 VWS: CPT

## 2020-10-23 ENCOUNTER — OFFICE VISIT (OUTPATIENT)
Dept: MEDICAL GROUP | Facility: LAB | Age: 30
End: 2020-10-23
Payer: COMMERCIAL

## 2020-10-23 VITALS
DIASTOLIC BLOOD PRESSURE: 80 MMHG | BODY MASS INDEX: 33.75 KG/M2 | RESPIRATION RATE: 12 BRPM | SYSTOLIC BLOOD PRESSURE: 116 MMHG | HEIGHT: 66 IN | OXYGEN SATURATION: 98 % | WEIGHT: 210 LBS | HEART RATE: 94 BPM | TEMPERATURE: 97.6 F

## 2020-10-23 DIAGNOSIS — Z98.890 S/P DECOMPRESSION OF ULNAR NERVE: ICD-10-CM

## 2020-10-23 DIAGNOSIS — F33.9 RECURRENT MAJOR DEPRESSIVE DISORDER, REMISSION STATUS UNSPECIFIED (HCC): ICD-10-CM

## 2020-10-23 DIAGNOSIS — E66.9 OBESITY (BMI 30-39.9): ICD-10-CM

## 2020-10-23 DIAGNOSIS — R20.2 RIGHT LEG PARESTHESIAS: ICD-10-CM

## 2020-10-23 PROBLEM — G56.20 ULNAR NERVE COMPRESSION: Status: RESOLVED | Noted: 2020-01-24 | Resolved: 2020-10-23

## 2020-10-23 PROCEDURE — 99214 OFFICE O/P EST MOD 30 MIN: CPT | Performed by: FAMILY MEDICINE

## 2020-10-23 ASSESSMENT — ENCOUNTER SYMPTOMS
WHEEZING: 0
FEVER: 0
CHILLS: 0
SHORTNESS OF BREATH: 0
COUGH: 0

## 2020-10-23 ASSESSMENT — FIBROSIS 4 INDEX: FIB4 SCORE: 0.44

## 2020-10-23 NOTE — PROGRESS NOTES
"Subjective:     CC: Follow up    HPI:   Jaqui presents today to discuss:    Mood- stable in celexa    Right leg paresthesias-continues to follow with pain management.  Has not responded to multiple therapies.  She continues on naltrexone.  They discussed a nerve stimulator which she would like to hold off on this.    Ulnar nerve decompression -underwent surgery for this, continues to follow with ortho      Health Maintenance: Completed    Medications, past medical history, allergies, and social history have been reviewed and updated.    ROS:  Review of Systems   Constitutional: Negative for chills and fever.   HENT: Negative for congestion.    Respiratory: Negative for cough, shortness of breath and wheezing.         Objective:       Exam:  /80 (BP Location: Right arm, Patient Position: Sitting, BP Cuff Size: Large adult)   Pulse 94   Temp 36.4 °C (97.6 °F)   Resp 12   Ht 1.676 m (5' 6\")   Wt 95.3 kg (210 lb) Comment: Verbal, could not obtain in office  SpO2 98%   BMI 33.89 kg/m²  Body mass index is 33.89 kg/m².    Constitutional: Alert. Well appearing. No distress.  Skin: Warm, dry, good turgor, no visible rashes.  Eye: Equal, round and reactive to light, conjunctiva clear, lids normal.  ENMT: Moist mucous membranes.   Respiratory: Normal effort.  Neuro: Moves all four extremities. No facial droop.  Psych: Answers questions appropriately. Normal affect and mood.      Assessment & Plan:     30 y.o. female with the following -     1. Recurrent major depressive disorder, remission status unspecified (HCC)  Chronic issue, stable.  Continue Celexa.    2. Right leg paresthesias  Chronic issue, stable.  She continues to follow with pain management.  She is on naltrexone for this.    3. S/P decompression of ulnar nerve  Continues to follow with orthopedics.    4. Obesity (BMI 30-39.9)  Discussed continued diet and lifestyle modifications.    Please note that this note was created using voice recognition " software.

## 2020-11-04 ENCOUNTER — HOSPITAL ENCOUNTER (OUTPATIENT)
Dept: LAB | Facility: MEDICAL CENTER | Age: 30
End: 2020-11-04
Attending: PHYSICIAN ASSISTANT
Payer: COMMERCIAL

## 2020-11-04 PROCEDURE — 87591 N.GONORRHOEAE DNA AMP PROB: CPT

## 2020-11-04 PROCEDURE — 87624 HPV HI-RISK TYP POOLED RSLT: CPT

## 2020-11-04 PROCEDURE — 88175 CYTOPATH C/V AUTO FLUID REDO: CPT

## 2020-11-04 PROCEDURE — 87491 CHLMYD TRACH DNA AMP PROBE: CPT

## 2020-11-07 LAB
C TRACH DNA GENITAL QL NAA+PROBE: NEGATIVE
CYTOLOGY REG CYTOL: NORMAL
N GONORRHOEA DNA GENITAL QL NAA+PROBE: NEGATIVE
SPECIMEN SOURCE: NORMAL

## 2020-11-11 LAB
HPV HR 12 DNA CVX QL NAA+PROBE: POSITIVE
HPV16 DNA SPEC QL NAA+PROBE: NEGATIVE
HPV18 DNA SPEC QL NAA+PROBE: NEGATIVE
SPECIMEN SOURCE: ABNORMAL

## 2020-11-16 ENCOUNTER — HOSPITAL ENCOUNTER (OUTPATIENT)
Dept: LAB | Facility: MEDICAL CENTER | Age: 30
End: 2020-11-16
Attending: PHYSICIAN ASSISTANT
Payer: COMMERCIAL

## 2020-11-16 PROCEDURE — 88305 TISSUE EXAM BY PATHOLOGIST: CPT | Mod: 59

## 2020-11-17 LAB — PATHOLOGY CONSULT NOTE: NORMAL

## 2020-12-12 ENCOUNTER — HOSPITAL ENCOUNTER (OUTPATIENT)
Dept: RADIOLOGY | Facility: MEDICAL CENTER | Age: 30
End: 2020-12-12
Attending: PHYSICIAN ASSISTANT
Payer: COMMERCIAL

## 2020-12-12 ENCOUNTER — OFFICE VISIT (OUTPATIENT)
Dept: URGENT CARE | Facility: PHYSICIAN GROUP | Age: 30
End: 2020-12-12
Payer: COMMERCIAL

## 2020-12-12 VITALS
HEART RATE: 91 BPM | OXYGEN SATURATION: 97 % | HEIGHT: 66 IN | DIASTOLIC BLOOD PRESSURE: 82 MMHG | TEMPERATURE: 98 F | SYSTOLIC BLOOD PRESSURE: 120 MMHG | RESPIRATION RATE: 16 BRPM | WEIGHT: 220 LBS | BODY MASS INDEX: 35.36 KG/M2

## 2020-12-12 DIAGNOSIS — S89.91XA INJURY OF RIGHT KNEE, INITIAL ENCOUNTER: ICD-10-CM

## 2020-12-12 DIAGNOSIS — S82.001A CLOSED NONDISPLACED FRACTURE OF RIGHT PATELLA, UNSPECIFIED FRACTURE MORPHOLOGY, INITIAL ENCOUNTER: ICD-10-CM

## 2020-12-12 PROCEDURE — 73564 X-RAY EXAM KNEE 4 OR MORE: CPT | Mod: RT

## 2020-12-12 PROCEDURE — 99214 OFFICE O/P EST MOD 30 MIN: CPT | Performed by: PHYSICIAN ASSISTANT

## 2020-12-12 RX ORDER — HYDROCODONE BITARTRATE AND ACETAMINOPHEN 5; 325 MG/1; MG/1
1 TABLET ORAL EVERY 6 HOURS PRN
Qty: 20 TAB | Refills: 0 | Status: SHIPPED | OUTPATIENT
Start: 2020-12-12 | End: 2020-12-19

## 2020-12-12 ASSESSMENT — ENCOUNTER SYMPTOMS
FEVER: 0
TINGLING: 0
VOMITING: 0
SHORTNESS OF BREATH: 0
PALPITATIONS: 0
NAUSEA: 1
BLURRED VISION: 0
SORE THROAT: 0
LEG PAIN: 1
SENSORY CHANGE: 0
CHILLS: 0
JOINT SWELLING: 1

## 2020-12-12 ASSESSMENT — FIBROSIS 4 INDEX: FIB4 SCORE: 0.44

## 2020-12-12 NOTE — PROGRESS NOTES
Subjective:      Jaqui Kumar is a 30 y.o. female who presents with Leg Pain (2x days ( got tripped by a goat), right knee is swollen. )      Knee Injury  This is a new problem. The current episode started in the past 7 days (2 days ago). The problem occurs constantly. The problem has been unchanged. Associated symptoms include joint swelling (right knee) and nausea. Pertinent negatives include no chest pain, chills, fever, rash, sore throat or vomiting. The symptoms are aggravated by walking (Flexion or extension of the knee.  Palpation). She has tried ice and acetaminophen (Patient is unable to take ibuprofen due to history of gastric sleeve surgery) for the symptoms. The treatment provided no relief.   Patient states injury occurred 2 days ago when her goat ran into her legs, knocking her legs out from underneath of her.  She fell forward and landed directly on her right knee.    Review of Systems   Constitutional: Negative for chills and fever.   HENT: Negative for sore throat.    Eyes: Negative for blurred vision.   Respiratory: Negative for shortness of breath.    Cardiovascular: Negative for chest pain and palpitations.   Gastrointestinal: Positive for nausea. Negative for vomiting.   Musculoskeletal: Positive for joint pain (right knee) and joint swelling (right knee).   Skin: Negative for rash.        Abrasion of right knee   Neurological: Negative for tingling and sensory change.       PMH:  has a past medical history of Anesthesia, Anxiety, Arthritis, Carpal tunnel syndrome, Depression, Migraine, and Pain (09/2017). She also has no past medical history of Clotting disorder ([facility]).  MEDS:   Current Outpatient Medications:   •  NALTREXONE HCL PO, Take 4.5 mg by mouth every day., Disp: , Rfl:   •  citalopram (CELEXA) 40 MG Tab, Take 1 Tab by mouth every day., Disp: 90 Tab, Rfl: 3  •  cyclobenzaprine (FLEXERIL) 5 MG tablet, Take 1-2 Tabs by mouth 3 times a day as needed., Disp: 30 Tab, Rfl: 0  •   "lidocaine (LIDODERM) 5 % Patch, Apply 1 Patch to skin as directed every 24 hours., Disp: 10 Patch, Rfl: 0  •  acetaminophen (TYLENOL) 500 MG Tab, Take 1,000 mg by mouth every 6 hours as needed for Moderate Pain., Disp: , Rfl:   •  omeprazole (PRILOSEC) 20 MG delayed-release capsule, Take 20 mg by mouth every day., Disp: , Rfl: 0  •  vitamin D, Ergocalciferol, (DRISDOL) 21309 units Cap capsule, Take 50,000 Units by mouth every 7 days. On Thur, Disp: , Rfl: 0  ALLERGIES:   Allergies   Allergen Reactions   • Lyrica      Severe headaches     SURGHX:   Past Surgical History:   Procedure Laterality Date   • GASTRIC SLEEVE LAPAROSCOPY N/A 9/22/2017    Procedure: GASTRIC SLEEVE LAPAROSCOPY;  Surgeon: Darius Segura M.D.;  Location: SURGERY [facility];  Service: General   • LIVER BIOPSY LAPAROSCOPIC N/A 9/22/2017    Procedure: LIVER BIOPSY LAPAROSCOPIC;  Surgeon: Darius Segura M.D.;  Location: SURGERY [facility];  Service: General   • OTHER  2011    Ablation, back pain   • ABDOMINAL EXPLORATION     • EYE SURGERY     • HERNIA REPAIR       SOCHX:  reports that she has never smoked. She has never used smokeless tobacco. She reports current alcohol use. She reports that she does not use drugs.  FH: Family history was reviewed, no pertinent findings to report     Objective:     /82 (BP Location: Left arm, Patient Position: Sitting, BP Cuff Size: Adult)   Pulse 91   Temp 36.7 °C (98 °F) (Temporal)   Resp 16   Ht 1.676 m (5' 6\")   Wt 99.8 kg (220 lb)   SpO2 97%   BMI 35.51 kg/m²      Physical Exam  Constitutional:       Appearance: She is well-developed.   HENT:      Head: Normocephalic and atraumatic.      Right Ear: External ear normal.      Left Ear: External ear normal.   Eyes:      Conjunctiva/sclera: Conjunctivae normal.      Pupils: Pupils are equal, round, and reactive to light.   Cardiovascular:      Rate and Rhythm: Normal rate and regular rhythm.      Heart sounds: Normal heart sounds. No " murmur.   Pulmonary:      Effort: Pulmonary effort is normal.      Breath sounds: Normal breath sounds. No wheezing.   Musculoskeletal:      Right knee: She exhibits decreased range of motion, swelling, effusion, ecchymosis and bony tenderness. Tenderness found.      Comments: Right knee with significantly decreased range of motion, swelling, and mild effusion noted.  It is diffusely tender to palpation most notable on the patella.  There is a small abrasion on the anterior aspect of the knee without drainage or signs of infection.  Unable to do special tests due to how much pain and swelling there is.   Skin:     General: Skin is warm and dry.      Capillary Refill: Capillary refill takes less than 2 seconds.   Neurological:      Mental Status: She is alert and oriented to person, place, and time.   Psychiatric:         Behavior: Behavior normal.         Judgment: Judgment normal.         DX-KNEE COMPLETE 4+ RIGHT   IMPRESSION:     Nondisplaced fracture of the lateral aspect of the patella.     Mildly advanced tricompartmental degenerative changes.      *X-rays were reviewed and interpreted independently by me. I agree with the radiologist's findings     Assessment/Plan:     1. Injury of right knee, initial encounter  - DX-KNEE COMPLETE 4+ RIGHT; Future  - REFERRAL TO ORTHOPEDICS  - HYDROcodone-acetaminophen (NORCO) 5-325 MG Tab per tablet; Take 1 Tab by mouth every 6 hours as needed (moderate to severe pain) for up to 7 days.  Dispense: 20 Tab; Refill: 0    2. Closed nondisplaced fracture of right patella, unspecified fracture morphology, initial encounter  - REFERRAL TO ORTHOPEDICS  - HYDROcodone-acetaminophen (NORCO) 5-325 MG Tab per tablet; Take 1 Tab by mouth every 6 hours as needed (moderate to severe pain) for up to 7 days.  Dispense: 20 Tab; Refill: 0        -Patient was placed in a full leg posterior Ortho-Glass splint with the leg in full extension  - Apply ice multiple times per day  - Keep elevated as  much as possible  - Follow up with orthopedics for more definitive management    PDMP was reviewed prior to prescribing the narcotic pain medications.  No evidence of increased risk of use was identified.  Patient was advised not to use this medication while drinking alcohol, driving, or operating heavy machinery.  She was advised to use it sparingly.

## 2020-12-22 ENCOUNTER — HOSPITAL ENCOUNTER (OUTPATIENT)
Dept: LAB | Facility: MEDICAL CENTER | Age: 30
End: 2020-12-22
Attending: NURSE PRACTITIONER
Payer: COMMERCIAL

## 2020-12-22 LAB
ALBUMIN SERPL BCP-MCNC: 4 G/DL (ref 3.2–4.9)
ALBUMIN/GLOB SERPL: 1.3 G/DL
ALP SERPL-CCNC: 50 U/L (ref 30–99)
ALT SERPL-CCNC: 43 U/L (ref 2–50)
ANION GAP SERPL CALC-SCNC: 9 MMOL/L (ref 7–16)
AST SERPL-CCNC: 15 U/L (ref 12–45)
BILIRUB SERPL-MCNC: 0.2 MG/DL (ref 0.1–1.5)
BUN SERPL-MCNC: 19 MG/DL (ref 8–22)
CALCIUM SERPL-MCNC: 9.2 MG/DL (ref 8.5–10.5)
CHLORIDE SERPL-SCNC: 103 MMOL/L (ref 96–112)
CO2 SERPL-SCNC: 23 MMOL/L (ref 20–33)
CREAT SERPL-MCNC: 0.64 MG/DL (ref 0.5–1.4)
GLOBULIN SER CALC-MCNC: 3.1 G/DL (ref 1.9–3.5)
GLUCOSE SERPL-MCNC: 75 MG/DL (ref 65–99)
POTASSIUM SERPL-SCNC: 3.9 MMOL/L (ref 3.6–5.5)
PROT SERPL-MCNC: 7.1 G/DL (ref 6–8.2)
SODIUM SERPL-SCNC: 135 MMOL/L (ref 135–145)

## 2020-12-22 PROCEDURE — 80053 COMPREHEN METABOLIC PANEL: CPT

## 2020-12-22 PROCEDURE — 36415 COLL VENOUS BLD VENIPUNCTURE: CPT

## 2021-02-22 ENCOUNTER — PRE-ADMISSION TESTING (OUTPATIENT)
Dept: ADMISSIONS | Facility: MEDICAL CENTER | Age: 31
End: 2021-02-22
Attending: ORTHOPAEDIC SURGERY
Payer: COMMERCIAL

## 2021-02-22 RX ORDER — METRONIDAZOLE 7.5 MG/G
GEL VAGINAL
COMMUNITY
Start: 2021-02-17 | End: 2021-05-06

## 2021-02-22 RX ORDER — DICLOFENAC EPOLAMINE 0.01 G/1
SYSTEM TOPICAL
COMMUNITY
End: 2021-05-06

## 2021-02-22 NOTE — PREPROCEDURE INSTRUCTIONS
"Patient called for preadmit appointment: \"Preparing for your Procedure information,\" pain management, patient safety, covid symptoms, self isolating and fasting protocol per anesthesia sheets given to patient with verbal and written instructions in email. Patient instructed to continue prescribed medications through the day before surgery, instructed to take the following medications the day of surgery per anesthesia protocol: Tylenol if needed and take prilosec DOS. Patient states no reaction to previous anesthesia for herself but does have family issues which were added to EPIC. Covid appointment scheduled for 2/26. Patient educated to call MD's office if any symptoms of Covid appear. Patient understands education and has no other questions or concerns at this time.   "

## 2021-02-26 ENCOUNTER — PRE-ADMISSION TESTING (OUTPATIENT)
Dept: ADMISSIONS | Facility: MEDICAL CENTER | Age: 31
End: 2021-02-26
Attending: ORTHOPAEDIC SURGERY
Payer: COMMERCIAL

## 2021-02-26 DIAGNOSIS — Z01.812 PRE-OPERATIVE LABORATORY EXAMINATION: ICD-10-CM

## 2021-02-26 LAB
COVID ORDER STATUS COVID19: NORMAL
SARS-COV-2 RNA RESP QL NAA+PROBE: NOTDETECTED
SPECIMEN SOURCE: NORMAL

## 2021-02-26 PROCEDURE — U0005 INFEC AGEN DETEC AMPLI PROBE: HCPCS

## 2021-02-26 PROCEDURE — U0003 INFECTIOUS AGENT DETECTION BY NUCLEIC ACID (DNA OR RNA); SEVERE ACUTE RESPIRATORY SYNDROME CORONAVIRUS 2 (SARS-COV-2) (CORONAVIRUS DISEASE [COVID-19]), AMPLIFIED PROBE TECHNIQUE, MAKING USE OF HIGH THROUGHPUT TECHNOLOGIES AS DESCRIBED BY CMS-2020-01-R: HCPCS

## 2021-02-26 PROCEDURE — C9803 HOPD COVID-19 SPEC COLLECT: HCPCS

## 2021-03-02 ENCOUNTER — ANESTHESIA (OUTPATIENT)
Dept: SURGERY | Facility: MEDICAL CENTER | Age: 31
End: 2021-03-02
Payer: COMMERCIAL

## 2021-03-02 ENCOUNTER — HOSPITAL ENCOUNTER (OUTPATIENT)
Facility: MEDICAL CENTER | Age: 31
End: 2021-03-02
Attending: ORTHOPAEDIC SURGERY | Admitting: ORTHOPAEDIC SURGERY
Payer: COMMERCIAL

## 2021-03-02 ENCOUNTER — NURSE TRIAGE (OUTPATIENT)
Dept: HEALTH INFORMATION MANAGEMENT | Facility: OTHER | Age: 31
End: 2021-03-02

## 2021-03-02 ENCOUNTER — ANESTHESIA EVENT (OUTPATIENT)
Dept: SURGERY | Facility: MEDICAL CENTER | Age: 31
End: 2021-03-02
Payer: COMMERCIAL

## 2021-03-02 VITALS
TEMPERATURE: 99 F | SYSTOLIC BLOOD PRESSURE: 139 MMHG | BODY MASS INDEX: 39.82 KG/M2 | DIASTOLIC BLOOD PRESSURE: 86 MMHG | RESPIRATION RATE: 16 BRPM | HEIGHT: 64 IN | WEIGHT: 233.25 LBS | HEART RATE: 115 BPM | OXYGEN SATURATION: 93 %

## 2021-03-02 DIAGNOSIS — G89.18 POSTOPERATIVE PAIN: ICD-10-CM

## 2021-03-02 LAB
ERYTHROCYTE [DISTWIDTH] IN BLOOD BY AUTOMATED COUNT: 40.7 FL (ref 35.9–50)
HCG UR QL: NEGATIVE
HCT VFR BLD AUTO: 38.5 % (ref 37–47)
HGB BLD-MCNC: 12.6 G/DL (ref 12–16)
MCH RBC QN AUTO: 29.8 PG (ref 27–33)
MCHC RBC AUTO-ENTMCNC: 32.7 G/DL (ref 33.6–35)
MCV RBC AUTO: 91 FL (ref 81.4–97.8)
PLATELET # BLD AUTO: 261 K/UL (ref 164–446)
PMV BLD AUTO: 10 FL (ref 9–12.9)
RBC # BLD AUTO: 4.23 M/UL (ref 4.2–5.4)
WBC # BLD AUTO: 6 K/UL (ref 4.8–10.8)

## 2021-03-02 PROCEDURE — A4565 SLINGS: HCPCS | Performed by: ORTHOPAEDIC SURGERY

## 2021-03-02 PROCEDURE — C1713 ANCHOR/SCREW BN/BN,TIS/BN: HCPCS | Performed by: ORTHOPAEDIC SURGERY

## 2021-03-02 PROCEDURE — 700105 HCHG RX REV CODE 258: Performed by: ORTHOPAEDIC SURGERY

## 2021-03-02 PROCEDURE — 160002 HCHG RECOVERY MINUTES (STAT): Performed by: ORTHOPAEDIC SURGERY

## 2021-03-02 PROCEDURE — 160046 HCHG PACU - 1ST 60 MINS PHASE II: Performed by: ORTHOPAEDIC SURGERY

## 2021-03-02 PROCEDURE — 160028 HCHG SURGERY MINUTES - 1ST 30 MINS LEVEL 3: Performed by: ORTHOPAEDIC SURGERY

## 2021-03-02 PROCEDURE — 700111 HCHG RX REV CODE 636 W/ 250 OVERRIDE (IP): Performed by: ANESTHESIOLOGY

## 2021-03-02 PROCEDURE — 160025 RECOVERY II MINUTES (STATS): Performed by: ORTHOPAEDIC SURGERY

## 2021-03-02 PROCEDURE — 160035 HCHG PACU - 1ST 60 MINS PHASE I: Performed by: ORTHOPAEDIC SURGERY

## 2021-03-02 PROCEDURE — A9270 NON-COVERED ITEM OR SERVICE: HCPCS | Performed by: ORTHOPAEDIC SURGERY

## 2021-03-02 PROCEDURE — 160009 HCHG ANES TIME/MIN: Performed by: ORTHOPAEDIC SURGERY

## 2021-03-02 PROCEDURE — 700101 HCHG RX REV CODE 250: Performed by: ORTHOPAEDIC SURGERY

## 2021-03-02 PROCEDURE — 502576 HCHG PACK, HAND: Performed by: ORTHOPAEDIC SURGERY

## 2021-03-02 PROCEDURE — 160036 HCHG PACU - EA ADDL 30 MINS PHASE I: Performed by: ORTHOPAEDIC SURGERY

## 2021-03-02 PROCEDURE — 501838 HCHG SUTURE GENERAL: Performed by: ORTHOPAEDIC SURGERY

## 2021-03-02 PROCEDURE — 160048 HCHG OR STATISTICAL LEVEL 1-5: Performed by: ORTHOPAEDIC SURGERY

## 2021-03-02 PROCEDURE — 81025 URINE PREGNANCY TEST: CPT

## 2021-03-02 PROCEDURE — 85027 COMPLETE CBC AUTOMATED: CPT

## 2021-03-02 PROCEDURE — 700102 HCHG RX REV CODE 250 W/ 637 OVERRIDE(OP): Performed by: ANESTHESIOLOGY

## 2021-03-02 PROCEDURE — 160039 HCHG SURGERY MINUTES - EA ADDL 1 MIN LEVEL 3: Performed by: ORTHOPAEDIC SURGERY

## 2021-03-02 PROCEDURE — A9270 NON-COVERED ITEM OR SERVICE: HCPCS | Performed by: ANESTHESIOLOGY

## 2021-03-02 DEVICE — SUTURE ANCHOR TWINFIX 3.5 WITH NEEDLES SMALL JOINT: Type: IMPLANTABLE DEVICE | Site: ARM | Status: FUNCTIONAL

## 2021-03-02 RX ORDER — KETOROLAC TROMETHAMINE 30 MG/ML
INJECTION, SOLUTION INTRAMUSCULAR; INTRAVENOUS PRN
Status: DISCONTINUED | OUTPATIENT
Start: 2021-03-02 | End: 2021-03-02 | Stop reason: SURG

## 2021-03-02 RX ORDER — OXYCODONE HYDROCHLORIDE AND ACETAMINOPHEN 5; 325 MG/1; MG/1
1 TABLET ORAL
Status: COMPLETED | OUTPATIENT
Start: 2021-03-02 | End: 2021-03-02

## 2021-03-02 RX ORDER — MIDAZOLAM HYDROCHLORIDE 1 MG/ML
INJECTION INTRAMUSCULAR; INTRAVENOUS PRN
Status: DISCONTINUED | OUTPATIENT
Start: 2021-03-02 | End: 2021-03-02 | Stop reason: SURG

## 2021-03-02 RX ORDER — HYDROMORPHONE HYDROCHLORIDE 1 MG/ML
0.1 INJECTION, SOLUTION INTRAMUSCULAR; INTRAVENOUS; SUBCUTANEOUS
Status: DISCONTINUED | OUTPATIENT
Start: 2021-03-02 | End: 2021-03-02 | Stop reason: HOSPADM

## 2021-03-02 RX ORDER — HYDROMORPHONE HYDROCHLORIDE 1 MG/ML
0.4 INJECTION, SOLUTION INTRAMUSCULAR; INTRAVENOUS; SUBCUTANEOUS
Status: DISCONTINUED | OUTPATIENT
Start: 2021-03-02 | End: 2021-03-02 | Stop reason: HOSPADM

## 2021-03-02 RX ORDER — LABETALOL HYDROCHLORIDE 5 MG/ML
5 INJECTION, SOLUTION INTRAVENOUS
Status: DISCONTINUED | OUTPATIENT
Start: 2021-03-02 | End: 2021-03-02 | Stop reason: HOSPADM

## 2021-03-02 RX ORDER — ONDANSETRON 2 MG/ML
INJECTION INTRAMUSCULAR; INTRAVENOUS PRN
Status: DISCONTINUED | OUTPATIENT
Start: 2021-03-02 | End: 2021-03-02 | Stop reason: SURG

## 2021-03-02 RX ORDER — SODIUM CHLORIDE, SODIUM LACTATE, POTASSIUM CHLORIDE, CALCIUM CHLORIDE 600; 310; 30; 20 MG/100ML; MG/100ML; MG/100ML; MG/100ML
INJECTION, SOLUTION INTRAVENOUS CONTINUOUS
Status: DISCONTINUED | OUTPATIENT
Start: 2021-03-02 | End: 2021-03-02 | Stop reason: HOSPADM

## 2021-03-02 RX ORDER — HYDROMORPHONE HYDROCHLORIDE 1 MG/ML
0.2 INJECTION, SOLUTION INTRAMUSCULAR; INTRAVENOUS; SUBCUTANEOUS
Status: DISCONTINUED | OUTPATIENT
Start: 2021-03-02 | End: 2021-03-02 | Stop reason: HOSPADM

## 2021-03-02 RX ORDER — METOPROLOL TARTRATE 1 MG/ML
1 INJECTION, SOLUTION INTRAVENOUS
Status: DISCONTINUED | OUTPATIENT
Start: 2021-03-02 | End: 2021-03-02 | Stop reason: HOSPADM

## 2021-03-02 RX ORDER — HYDRALAZINE HYDROCHLORIDE 20 MG/ML
5 INJECTION INTRAMUSCULAR; INTRAVENOUS
Status: DISCONTINUED | OUTPATIENT
Start: 2021-03-02 | End: 2021-03-02 | Stop reason: HOSPADM

## 2021-03-02 RX ORDER — CEFAZOLIN SODIUM 1 G/3ML
INJECTION, POWDER, FOR SOLUTION INTRAMUSCULAR; INTRAVENOUS PRN
Status: DISCONTINUED | OUTPATIENT
Start: 2021-03-02 | End: 2021-03-02 | Stop reason: SURG

## 2021-03-02 RX ORDER — DIPHENHYDRAMINE HYDROCHLORIDE 50 MG/ML
12.5 INJECTION INTRAMUSCULAR; INTRAVENOUS
Status: DISCONTINUED | OUTPATIENT
Start: 2021-03-02 | End: 2021-03-02 | Stop reason: HOSPADM

## 2021-03-02 RX ORDER — DEXAMETHASONE SODIUM PHOSPHATE 4 MG/ML
INJECTION, SOLUTION INTRA-ARTICULAR; INTRALESIONAL; INTRAMUSCULAR; INTRAVENOUS; SOFT TISSUE PRN
Status: DISCONTINUED | OUTPATIENT
Start: 2021-03-02 | End: 2021-03-02 | Stop reason: SURG

## 2021-03-02 RX ORDER — BUPIVACAINE HYDROCHLORIDE 5 MG/ML
INJECTION, SOLUTION EPIDURAL; INTRACAUDAL
Status: DISCONTINUED | OUTPATIENT
Start: 2021-03-02 | End: 2021-03-02 | Stop reason: HOSPADM

## 2021-03-02 RX ORDER — HALOPERIDOL 5 MG/ML
1 INJECTION INTRAMUSCULAR
Status: DISCONTINUED | OUTPATIENT
Start: 2021-03-02 | End: 2021-03-02 | Stop reason: HOSPADM

## 2021-03-02 RX ORDER — HYDROCODONE BITARTRATE AND ACETAMINOPHEN 5; 325 MG/1; MG/1
1-2 TABLET ORAL EVERY 4 HOURS PRN
Qty: 30 TABLET | Refills: 0 | Status: SHIPPED | OUTPATIENT
Start: 2021-03-02 | End: 2021-03-07

## 2021-03-02 RX ORDER — OXYCODONE HYDROCHLORIDE AND ACETAMINOPHEN 5; 325 MG/1; MG/1
2 TABLET ORAL
Status: COMPLETED | OUTPATIENT
Start: 2021-03-02 | End: 2021-03-02

## 2021-03-02 RX ADMIN — MIDAZOLAM HYDROCHLORIDE 2 MG: 1 INJECTION, SOLUTION INTRAMUSCULAR; INTRAVENOUS at 07:25

## 2021-03-02 RX ADMIN — CEFAZOLIN 2 G: 1 INJECTION, POWDER, FOR SOLUTION INTRAVENOUS at 07:32

## 2021-03-02 RX ADMIN — FENTANYL CITRATE 50 MCG: 50 INJECTION, SOLUTION INTRAMUSCULAR; INTRAVENOUS at 09:35

## 2021-03-02 RX ADMIN — FENTANYL CITRATE 50 MCG: 50 INJECTION, SOLUTION INTRAMUSCULAR; INTRAVENOUS at 07:45

## 2021-03-02 RX ADMIN — POVIDONE IODINE 15 ML: 100 SOLUTION TOPICAL at 07:15

## 2021-03-02 RX ADMIN — FENTANYL CITRATE 25 MCG: 50 INJECTION, SOLUTION INTRAMUSCULAR; INTRAVENOUS at 08:06

## 2021-03-02 RX ADMIN — FENTANYL CITRATE 25 MCG: 50 INJECTION, SOLUTION INTRAMUSCULAR; INTRAVENOUS at 07:59

## 2021-03-02 RX ADMIN — FENTANYL CITRATE 100 MCG: 50 INJECTION, SOLUTION INTRAMUSCULAR; INTRAVENOUS at 07:36

## 2021-03-02 RX ADMIN — LIDOCAINE HYDROCHLORIDE 0.5 ML: 10 INJECTION, SOLUTION INFILTRATION; PERINEURAL at 07:15

## 2021-03-02 RX ADMIN — PROPOFOL 200 MG: 10 INJECTION, EMULSION INTRAVENOUS at 07:36

## 2021-03-02 RX ADMIN — FENTANYL CITRATE 50 MCG: 50 INJECTION, SOLUTION INTRAMUSCULAR; INTRAVENOUS at 09:18

## 2021-03-02 RX ADMIN — DEXAMETHASONE SODIUM PHOSPHATE 8 MG: 4 INJECTION, SOLUTION INTRAMUSCULAR; INTRAVENOUS at 07:31

## 2021-03-02 RX ADMIN — FENTANYL CITRATE 50 MCG: 50 INJECTION, SOLUTION INTRAMUSCULAR; INTRAVENOUS at 09:10

## 2021-03-02 RX ADMIN — KETOROLAC TROMETHAMINE 30 MG: 30 INJECTION, SOLUTION INTRAMUSCULAR at 08:45

## 2021-03-02 RX ADMIN — OXYCODONE HYDROCHLORIDE AND ACETAMINOPHEN 2 TABLET: 5; 325 TABLET ORAL at 09:25

## 2021-03-02 RX ADMIN — ONDANSETRON 4 MG: 2 INJECTION INTRAMUSCULAR; INTRAVENOUS at 07:54

## 2021-03-02 RX ADMIN — FENTANYL CITRATE 25 MCG: 50 INJECTION, SOLUTION INTRAMUSCULAR; INTRAVENOUS at 08:28

## 2021-03-02 RX ADMIN — SODIUM CHLORIDE, POTASSIUM CHLORIDE, SODIUM LACTATE AND CALCIUM CHLORIDE: 600; 310; 30; 20 INJECTION, SOLUTION INTRAVENOUS at 07:15

## 2021-03-02 ASSESSMENT — PAIN SCALES - GENERAL: PAIN_LEVEL: 0

## 2021-03-02 ASSESSMENT — FIBROSIS 4 INDEX
FIB4 SCORE: 0.28
FIB4 SCORE: 0.28

## 2021-03-02 ASSESSMENT — PAIN DESCRIPTION - PAIN TYPE
TYPE: ACUTE PAIN;SURGICAL PAIN
TYPE: ACUTE PAIN;SURGICAL PAIN

## 2021-03-02 NOTE — DISCHARGE INSTRUCTIONS
ACTIVITY: Rest and take it easy for the first 24 hours.  A responsible adult is recommended to remain with you during that time.  It is normal to feel sleepy.  We encourage you to not do anything that requires balance, judgment or coordination.    MILD FLU-LIKE SYMPTOMS ARE NORMAL. YOU MAY EXPERIENCE GENERALIZED MUSCLE ACHES, THROAT IRRITATION, HEADACHE AND/OR SOME NAUSEA.    FOR 24 HOURS DO NOT:  Drive, operate machinery or run household appliances.  Drink beer or alcoholic beverages.   Make important decisions or sign legal documents.    SPECIAL INSTRUCTIONS:   Non-weight bearing  Keep dressing clean and dry   Elevate extremity   Keep dressing on until next appointment   Encourage moving all fingers    DIET: To avoid nausea, slowly advance diet as tolerated, avoiding spicy or greasy foods for the first day. Add more substantial food to your diet according to your physician's instructions. INCREASE FLUIDS AND FIBER TO AVOID CONSTIPATION.    SURGICAL DRESSING/BATHING: May shower beginning tomorrow with dressings covered     FOLLOW-UP APPOINTMENT:  A follow-up appointment should be arranged with your doctor; call to schedule.    You should CALL YOUR PHYSICIAN if you develop:  Fever greater than 101 degrees F.  Pain not relieved by medication, or persistent nausea or vomiting.  Excessive bleeding (blood soaking through dressing) or unexpected drainage from the wound.  Extreme redness or swelling around the incision site, drainage of pus or foul smelling drainage.  Inability to urinate or empty your bladder within 8 hours.  Problems with breathing or chest pain.    You should call 911 if you develop problems with breathing or chest pain.  If you are unable to contact your doctor or surgical center, you should go to the nearest emergency room or urgent care center.      Dr Murphy #: [phone]    If any questions arise, call your doctor.  If your doctor is not available, please feel free to call the Surgical Center at  [phone]. The Center is open Monday through Friday from 7AM to 7PM. You can also call the HEALTH HOTLINE open 24 hours/day, 7 days/week and speak to a nurse at [phone], or toll free at [phone].    A registered nurse may call you a few days after your surgery to see how you are doing after your procedure.    MEDICATIONS: Resume taking daily medication.  Take prescribed pain medication with food.  If no medication is prescribed, you may take non-aspirin pain medication if needed. PAIN MEDICATION CAN BE VERY CONSTIPATING. Take a stool softener or laxative such as senokot, pericolace, or milk of magnesia if needed.    Last pain medication given at 09:25 am (oxyCODONE-acetaminophen (PERCOCET) 5-325 MG per tablet 2 tablet)    If your physician has prescribed pain medication that includes Acetaminophen (Tylenol), do not take additional Acetaminophen (Tylenol) while taking the prescribed medication.    Depression / Suicide Risk    As you are discharged from this [facility] facility, it is important to learn how to keep safe from harming yourself.    Recognize the warning signs:  · Abrupt changes in personality, positive or negative- including increase in energy   · Giving away possessions  · Change in eating patterns- significant weight changes-  positive or negative  · Change in sleeping patterns- unable to sleep or sleeping all the time   · Unwillingness or inability to communicate  · Depression  · Unusual sadness, discouragement and loneliness  · Talk of wanting to die  · Neglect of personal appearance   · Rebelliousness- reckless behavior  · Withdrawal from people/activities they love  · Confusion- inability to concentrate     If you or a loved one observes any of these behaviors or has concerns about self-harm, here's what you can do:  · Talk about it- your feelings and reasons for harming yourself  · Remove any means that you might use to hurt yourself (examples: pills, rope, extension cords,  firearm)  · Get professional help from the community (Mental Health, Substance Abuse, psychological counseling)  · Do not be alone:Call your Safe Contact- someone whom you trust who will be there for you.  · Call your local CRISIS HOTLINE 460-9187 or 761-738-9254  · Call your local Children's Mobile Crisis Response Team [place] (323) 696-9514 or www.[employer]  · Call the toll free National Suicide Prevention Hotlines   · National Suicide Prevention Lifeline 018-181-ZPUH (3692)  · National Hope Line Network 800-SUICIDE (891-6805)

## 2021-03-02 NOTE — ANESTHESIA PROCEDURE NOTES
Airway    Date/Time: 3/2/2021 7:39 AM  Performed by: Mayco Lara D.O.  Authorized by: Mayco Lara D.O.     Location:  OR  Urgency:  Elective  Indications for Airway Management:  Anesthesia      Spontaneous Ventilation: absent    Sedation Level:  Deep  Preoxygenated: Yes    Mask Difficulty Assessment:  1 - vent by mask  Final Airway Type:  Supraglottic airway  Final Supraglottic Airway:  Standard LMA    SGA Size:  4  Number of Attempts at Approach:  1

## 2021-03-02 NOTE — OR NURSING
0854 To PACU from OR via lisandro s/p carpal tunnel release and revision of a radial tunnel release to the right side. Pt sleeping, respirations spontaneous and non-labored via OPA. Surgical dressings to right arm, sling to RUE. Fingers to the affected extremity are pink and warm, brisk cap refill observed.     0905 Pt rouses spontaneously, OPA removed.     0909 Pt grimacing, moaning, restless, reports pain to her right arm. Pt denies nausea. Pt medicated for pain with IV pain medication.     0924 No changes. Pt medicated for pain with oral and IV pain medications.    0939 Pt reports 8/10 pain, IV pain medication in use.     0950 Pt's father updated.     0954 Pt reports 4/10 tolerable pain. Pt denies nausea. Report to discharge ERIC Hawkins.

## 2021-03-02 NOTE — OR NURSING
1001: Patient arrives in phase II with tolerable pain at 4/10, Dressing on Right Wrist. Patient's father en route, and should arrive at approx. 1020 +/-    1050: D/Jt to care of family post uneventful stay in PACU 2.

## 2021-03-02 NOTE — TELEPHONE ENCOUNTER
Pt taking norco after arm surgery today. Pt wondering if she is able to take tylenol in between norco doses. Advised that there is tylenol in the norco that she is taking, so be mindful not to exceed recommended dose of tylenol.    Reason for Disposition  • Caller has medication question, adult has minor symptoms, caller declines triage, and triager answers question    Additional Information  • Negative: Drug overdose and triager unable to answer question  • Negative: Caller requesting information unrelated to medicine  • Negative: Caller requesting a prescription for Strep throat and has a positive culture result  • Negative: Rash while taking a medication or within 3 days of stopping it  • Negative: Immunization reaction suspected  • Negative: Asthma and having symptoms of asthma (cough, wheezing, etc.)  • Negative: Breastfeeding questions about mother's medicines and diet  • Negative: MORE THAN A DOUBLE DOSE of a prescription or over-the-counter (OTC) drug  • Negative: DOUBLE DOSE (an extra dose or lesser amount) of over-the-counter (OTC) drug and any symptoms (e.g., dizziness, nausea, pain, sleepiness)  • Negative: DOUBLE DOSE (an extra dose or lesser amount) of prescription drug and any symptoms (e.g., dizziness, nausea, pain, sleepiness)  • Negative: Took another person's prescription drug  • Negative: DOUBLE DOSE (an extra dose or lesser amount) of prescription drug and NO symptoms (Exception: a double dose of antibiotics)  • Negative: Diabetes drug error or overdose (e.g., took wrong type of insulin or took extra dose)  • Negative: Caller has medication question about med not prescribed by PCP and triager unable to answer question (e.g., compatibility with other med, storage)  • Negative: Request for URGENT new prescription or refill of 'essential' medication (i.e., likelihood of harm to patient if not taken) and triager unable to fill per department policy  • Negative: Prescription not at pharmacy and was  "prescribed today by PCP  • Negative: Pharmacy calling with prescription questions and triager unable to answer question  • Negative: Caller has URGENT medication question about med that PCP prescribed and triager unable to answer question  • Negative: Caller has NON-URGENT medication question about med that PCP prescribed and triager unable to answer question  • Negative: Caller requesting a NON-URGENT new prescription or refill and triager unable to refill per department policy  • Negative: DOUBLE DOSE (an extra dose or lesser amount) of over-the-counter (OTC) drug and NO symptoms  • Negative: DOUBLE DOSE (an extra dose or lesser amount) of antibiotic drug and NO symptoms  • Negative: Caller has medication question only, adult not sick, and triager answers question    Answer Assessment - Initial Assessment Questions  1. SYMPTOMS: \"Do you have any symptoms?\"      pain  2. SEVERITY: If symptoms are present, ask \"Are they mild, moderate or severe?\"      moderate    Protocols used: MEDICATION QUESTION CALL-A-OH      "

## 2021-03-02 NOTE — ANESTHESIA PREPROCEDURE EVALUATION
Relevant Problems   No relevant active problems       Physical Exam    Airway   Mallampati: II  TM distance: >3 FB  Neck ROM: full       Cardiovascular - normal exam  Rhythm: regular  Rate: normal  (-) murmur     Dental - normal exam           Pulmonary - normal exam  Breath sounds clear to auscultation     Abdominal    Neurological - normal exam                 Anesthesia Plan    ASA 2       Plan - general       Airway plan will be LMA          Induction: intravenous    Postoperative Plan: Postoperative administration of opioids is intended.    Pertinent diagnostic labs and testing reviewed    Informed Consent:    Anesthetic plan and risks discussed with patient.    Use of blood products discussed with: patient whom consented to blood products.

## 2021-03-02 NOTE — ANESTHESIA POSTPROCEDURE EVALUATION
Patient: Jaqui Kumar    Procedure Summary     Date: 03/02/21 Room / Location:  OR  / SURGERY [facility]    Anesthesia Start: 0725 Anesthesia Stop: 0904    Procedure: RELEASE, CARPAL TUNNEL - REVISION AND REVISION MODIFIED NIRSCHEL RADIAL TUNNEL RELEASE (Right Hand) Diagnosis: (CARPAL TUNNEL SYNDROME RIGHT, ULNAR NERVE ENTRAPMENT RIGHT, RADIAL NERVE TUNNEL SYNDROME)    Surgeons: Todd Murphy M.D. Responsible Provider: Mayco Lara D.O.    Anesthesia Type: general ASA Status: 2          Final Anesthesia Type: general  Last vitals  BP   Blood Pressure: 113/60    Temp   36.3 °C (97.3 °F)    Pulse   82   Resp   20    SpO2   100 %      Anesthesia Post Evaluation    Patient location during evaluation: PACU  Patient participation: complete - patient participated  Level of consciousness: awake and alert  Pain score: 0    Airway patency: patent  Anesthetic complications: no  Cardiovascular status: hemodynamically stable  Respiratory status: acceptable  Hydration status: euvolemic    PONV: none          No complications documented.     Nurse Pain Score: 6 (NPRS)

## 2021-03-02 NOTE — OR SURGEON
Immediate Post OP Note    PreOp Diagnosis: R radial tunnel , R lat epicondylitis , R cts    PostOp Diagnosis: same    Procedure(s):  RELEASE, CARPAL TUNNEL - REVISION AND REVISION MODIFIED NIRSCHEL RADIAL TUNNEL RELEASE - Wound Class: Clean    Surgeon(s):  Todd Murphy M.D.    Anesthesiologist/Type of Anesthesia:  Anesthesiologist: Mayco Lara D.O./General    Surgical Staff:  Circulator: Shayla Tavares R.N.  Scrub Person: Silviano Ortega    Specimens removed if any:  * No specimens in log *    Estimated Blood Loss: min    Findings: adequate releases    Complications: none        3/2/2021 7:01 AM Todd Murphy M.D.

## 2021-03-03 NOTE — OP REPORT
DATE OF SERVICE:  03/02/2021     PREOPERATIVE DIAGNOSES:  1.  Recurrent right lateral epicondylitis.  2.  Radial tunnel syndrome.  3.  Recurrent right carpal tunnel syndrome.     POSTOPERATIVE DIAGNOSES:  1.  Recurrent right lateral epicondylitis.  2.  Radial tunnel syndrome.  3.  Recurrent right carpal tunnel syndrome.     PROCEDURES:  1.  Right revision modified Nirschl repair to bone.  2.  Posterior interosseous nerve decompression.  3.  Revision of right open carpal tunnel release.  4.  Debridement flexors, right wrist.     SURGEON:  Todd Murphy MD     ANESTHESIA:  General.     ESTIMATED BLOOD LOSS:  Minimal.     DRAINS:  None.     COMPLICATIONS:  None.     INDICATIONS:  The patient is a female who has had multiple surgeries before,   had ongoing symptoms, failed conservative treatment.  We tried injections,   which did seem to really help alleviate her symptoms ____.  The plan was to   proceed in the areas where we gave her injections that gave her significant   relief.  I explained the risks, benefits, complications and alternatives to   surgery, ongoing pain, neurovascular injury, infection, need for further   surgery.  All questions were answered.  No guarantees were given.  Signed   consent was obtained.     DESCRIPTION OF PROCEDURE:  The patient was taken to the operating room, laid   supine on the table.  All bony prominences well padded.  Good general was   obtained without difficulty.  Right upper extremity was prepped and draped in   the usual sterile fashion using a ChloraPrep.  Limb was exsanguinated with   elevation, tourniquet raised, total tourniquet time was about an hour.  We   started with the carpal tunnel.  I made an incision over the transverse carpal   ligament along the radial border of the 4th digit.  I extended proximally in   the palm.  I identified the median nerve proximally.  I then freed it from the   surrounding soft tissue scar tracing distally.  It was found to be intact.     There was quite a bit of synovitis in the canal.  Subsequently, she underwent   an extensive debridement of the flexor tendons to prevent recurrence.  Once   everything was mobilized, the wounds were copiously irrigated.  I closed the   wound with nylon in mattress fashion.  I then went proximally, made an   incision along the EDC interval extended to the lateral epicondyle.  I   initially started with the radial nerve decompression and subsequently tracked   the interval down to the supinator, identified the posterior interosseous   nerve that exited the supinator care to protect the distal branches to the EDC   tendon.  I subsequently then split the supinator muscle belly while working   proximally elevating up all the way to the arcade of [place], found to be a   nice release along its entirety of the nerve.  Once I had completely freed the   nerve, I then at the elbow elevated and excised the ECRB kind of necrotic   tendon off its insertion using an osteotome.  I brought it down to kind of   cancellous bone. I placed an anchor from [employer] into the lateral   epicondyle.  Once I had good bleeding bone, I excised the necrotic tissue.  I   had mobilized the ECRL and brachioradialis tendons.  I then sutured it down   directly to the cancellous bone.  This was reinforced with 0 Vicryl in a   figure-of-eight fashion.  The wounds were copiously irrigated, closed the   muscle interval with Vicryl, subcutaneous with Vicryl, skin with nylon.  Local   without epinephrine was injected.  Sterile dressing of Xeroform, 4 x 4's,   soft roll and a resting splint was applied.  All needle, sponge counts were   correct.  The patient tolerated the procedure well and was transferred to   recovery room in stable condition.        ______________________________  MD LEELA ZAMUDIO/GO/TIFFANY    DD:  03/03/2021 06:49  DT:  03/03/2021 07:42    Job#:  [id number]

## 2021-04-17 ENCOUNTER — PATIENT MESSAGE (OUTPATIENT)
Dept: MEDICAL GROUP | Facility: LAB | Age: 31
End: 2021-04-17

## 2021-04-17 DIAGNOSIS — Z11.3 SCREENING FOR STD (SEXUALLY TRANSMITTED DISEASE): ICD-10-CM

## 2021-04-17 DIAGNOSIS — R61 NIGHT SWEATS: ICD-10-CM

## 2021-04-23 ENCOUNTER — HOSPITAL ENCOUNTER (OUTPATIENT)
Dept: LAB | Facility: MEDICAL CENTER | Age: 31
End: 2021-04-23
Attending: FAMILY MEDICINE
Payer: COMMERCIAL

## 2021-04-23 ENCOUNTER — APPOINTMENT (OUTPATIENT)
Dept: MEDICAL GROUP | Facility: LAB | Age: 31
End: 2021-04-23
Payer: COMMERCIAL

## 2021-04-23 DIAGNOSIS — Z11.3 SCREENING FOR STD (SEXUALLY TRANSMITTED DISEASE): ICD-10-CM

## 2021-04-23 DIAGNOSIS — R61 NIGHT SWEATS: ICD-10-CM

## 2021-04-23 LAB — HIV 1+2 AB+HIV1 P24 AG SERPL QL IA: NORMAL

## 2021-04-23 PROCEDURE — 86780 TREPONEMA PALLIDUM: CPT

## 2021-04-23 PROCEDURE — 36415 COLL VENOUS BLD VENIPUNCTURE: CPT

## 2021-04-23 PROCEDURE — 87491 CHLMYD TRACH DNA AMP PROBE: CPT

## 2021-04-23 PROCEDURE — 86803 HEPATITIS C AB TEST: CPT

## 2021-04-23 PROCEDURE — 84443 ASSAY THYROID STIM HORMONE: CPT

## 2021-04-23 PROCEDURE — 87389 HIV-1 AG W/HIV-1&-2 AB AG IA: CPT

## 2021-04-23 PROCEDURE — 87591 N.GONORRHOEAE DNA AMP PROB: CPT

## 2021-04-24 LAB
C TRACH DNA SPEC QL NAA+PROBE: NEGATIVE
HCV AB SER QL: NORMAL
N GONORRHOEA DNA SPEC QL NAA+PROBE: NEGATIVE
SPECIMEN SOURCE: NORMAL
TREPONEMA PALLIDUM IGG+IGM AB [PRESENCE] IN SERUM OR PLASMA BY IMMUNOASSAY: NORMAL
TSH SERPL DL<=0.005 MIU/L-ACNC: 2.86 UIU/ML (ref 0.38–5.33)

## 2021-05-06 ENCOUNTER — OFFICE VISIT (OUTPATIENT)
Dept: MEDICAL GROUP | Facility: LAB | Age: 31
End: 2021-05-06
Payer: COMMERCIAL

## 2021-05-06 VITALS
BODY MASS INDEX: 37.12 KG/M2 | TEMPERATURE: 97.5 F | OXYGEN SATURATION: 94 % | RESPIRATION RATE: 12 BRPM | HEIGHT: 66 IN | SYSTOLIC BLOOD PRESSURE: 96 MMHG | WEIGHT: 231 LBS | DIASTOLIC BLOOD PRESSURE: 64 MMHG | HEART RATE: 86 BPM

## 2021-05-06 DIAGNOSIS — R20.2 RIGHT LEG PARESTHESIAS: ICD-10-CM

## 2021-05-06 DIAGNOSIS — F33.1 MODERATE EPISODE OF RECURRENT MAJOR DEPRESSIVE DISORDER (HCC): ICD-10-CM

## 2021-05-06 DIAGNOSIS — F43.10 PTSD (POST-TRAUMATIC STRESS DISORDER): ICD-10-CM

## 2021-05-06 DIAGNOSIS — Z98.890 S/P DECOMPRESSION OF ULNAR NERVE: ICD-10-CM

## 2021-05-06 PROCEDURE — 99213 OFFICE O/P EST LOW 20 MIN: CPT | Performed by: FAMILY MEDICINE

## 2021-05-06 RX ORDER — TRAMADOL HYDROCHLORIDE 50 MG/1
50 TABLET ORAL
COMMUNITY
Start: 2021-04-20 | End: 2021-07-30

## 2021-05-06 ASSESSMENT — PATIENT HEALTH QUESTIONNAIRE - PHQ9
7. TROUBLE CONCENTRATING ON THINGS, SUCH AS READING THE NEWSPAPER OR WATCHING TELEVISION: MORE THAN HALF THE DAYS
1. LITTLE INTEREST OR PLEASURE IN DOING THINGS: MORE THAN HALF THE DAYS
4. FEELING TIRED OR HAVING LITTLE ENERGY: NEARLY EVERY DAY
3. TROUBLE FALLING OR STAYING ASLEEP OR SLEEPING TOO MUCH: NEARLY EVERY DAY
2. FEELING DOWN, DEPRESSED, IRRITABLE, OR HOPELESS: MORE THAN HALF THE DAYS
5. POOR APPETITE OR OVEREATING: MORE THAN HALF THE DAYS
9. THOUGHTS THAT YOU WOULD BE BETTER OFF DEAD, OR OF HURTING YOURSELF: SEVERAL DAYS
8. MOVING OR SPEAKING SO SLOWLY THAT OTHER PEOPLE COULD HAVE NOTICED. OR THE OPPOSITE, BEING SO FIGETY OR RESTLESS THAT YOU HAVE BEEN MOVING AROUND A LOT MORE THAN USUAL: NOT AT ALL
SUM OF ALL RESPONSES TO PHQ9 QUESTIONS 1 AND 2: 4
SUM OF ALL RESPONSES TO PHQ QUESTIONS 1-9: 17
6. FEELING BAD ABOUT YOURSELF - OR THAT YOU ARE A FAILURE OR HAVE LET YOURSELF OR YOUR FAMILY DOWN: MORE THAN HALF THE DAYS

## 2021-05-06 ASSESSMENT — FIBROSIS 4 INDEX: FIB4 SCORE: 0.29

## 2021-05-06 NOTE — PROGRESS NOTES
Subjective:     CC: Follow up    HPI:   Jaqui presents today to follow up on:    Ulnar nerve decompression  Has not seen an improvement in symptoms postop.  Now with numbness to her whole right hand and some limited function.  She continues to follow with orthopedics and is doing PT twice weekly.  Using brace.  MNow whole right hand numb  Doing PT twice weekly    Mood, PTSD  Continues to follow with therapist, has been diagnosed with PTSD.  They are planning to try EMDR.  She does think Celexa has been helpful.  Lots of recent life stress and changes with poor outcomes with medical procedures, boyfriend broke up with her.    She does report there was an instance where she considered cutting in the past.  She reached out to someone did not do this.  She reports she currently feels safe and has no plan for self-harm.    Depression Screening    Little interest or pleasure in doing things?   More than half the days  Feeling down, depressed , or hopeless?  More than half the days  Trouble falling or staying asleep, or sleeping too much?   Nearly every day  Feeling tired or having little energy?   Nearly every day  Poor appetite or overeating?   More than half the days  Feeling bad about yourself - or that you are a failure or have let yourself or your family down?  More than half the days  Trouble concentrating on things, such as reading the newspaper or watching television?  More than half the days  Moving or speaking so slowly that other people could have noticed.  Or the opposite - being so fidgety or restless that you have been moving around a lot more than usual?   Not at all  Thoughts that you would be better off dead, or of hurting yourself?   Several days  Patient Health Questionnaire Score:  (!) 17      If depressive symptoms identified deferred to follow up visit unless specifically addressed in assesment and plan.    Interpretation of PHQ-9 Total Score   Score Severity   1-4 No Depression   5-9 Mild Depression  "  10-14 Moderate Depression   15-19 Moderately Severe Depression   20-27 Severe Depression      Health Maintenance: Completed    Medications, past medical history, allergies, and social history have been reviewed and updated.    ROS:  See HPI      Objective:       Exam:  BP (!) 96/64 (BP Location: Left arm, Patient Position: Sitting, BP Cuff Size: Large adult)   Pulse 86   Temp 36.4 °C (97.5 °F)   Resp 12   Ht 1.676 m (5' 6\")   Wt 105 kg (231 lb)   SpO2 94%   BMI 37.28 kg/m²  Body mass index is 37.28 kg/m².    Constitutional: Alert. Well appearing. No distress.  Skin: Warm, dry, good turgor, no visible rashes.  Eye: Equal, round and reactive to light, conjunctiva clear, lids normal.  ENMT: Moist mucous membranes.  Respiratory: Normal effort.   Neuro: Moves all four extremities. No facial droop.  Psych: Answers questions appropriately. Normal affect and mood.    Assessment & Plan:     31 y.o. female with the following -     1. S/P decompression of ulnar nerve  2. Right leg paresthesias  Worsening symptoms after ulnar nerve decompression.  She continues to follow with more cortisone physical therapy.  She continues to follow with pain management for chronic right leg pain and paresthesias.    3. Moderate episode of recurrent major depressive disorder (HCC)  4. PTSD (post-traumatic stress disorder)  Chronic depression, recently worsened secondary to life events.  Recently diagnosed with PTSD through her therapist and they are trying to qualify her for EMDR.  Reports incidence of consideration of self-harm with cutting, she did reach out to somebody about this.  She currently feels safe and denies any plan for self-harm.  Continue Celexa for now, we will send referral to establish with psychiatry.  Continue with therapist.  Discussed reasons to seek care and safety plan in place.  Follow-up in 1 month.  - REFERRAL TO PSYCHIATRY        Please note that this note was created using voice recognition software.      "

## 2021-05-27 ENCOUNTER — HOSPITAL ENCOUNTER (OUTPATIENT)
Dept: LAB | Facility: MEDICAL CENTER | Age: 31
End: 2021-05-27
Attending: PHYSICIAN ASSISTANT
Payer: COMMERCIAL

## 2021-05-27 PROCEDURE — 87624 HPV HI-RISK TYP POOLED RSLT: CPT

## 2021-05-27 PROCEDURE — 88175 CYTOPATH C/V AUTO FLUID REDO: CPT

## 2021-05-28 LAB
CYTOLOGY REG CYTOL: ABNORMAL
HPV HR 12 DNA CVX QL NAA+PROBE: POSITIVE
HPV16 DNA SPEC QL NAA+PROBE: NEGATIVE
HPV18 DNA SPEC QL NAA+PROBE: NEGATIVE
SPECIMEN SOURCE: ABNORMAL

## 2021-06-09 NOTE — OR NURSING
Left message for patient to call office back back regarding his biopsy results.    Sent Dr. Waterman email regarding BMI of 42.

## 2021-07-09 PROBLEM — G90.511 COMPLEX REGIONAL PAIN SYNDROME TYPE 1 OF RIGHT UPPER EXTREMITY: Status: ACTIVE | Noted: 2021-07-09

## 2021-07-09 PROBLEM — M77.11 LATERAL EPICONDYLITIS OF BOTH ELBOWS: Status: ACTIVE | Noted: 2021-07-09

## 2021-07-09 PROBLEM — M77.12 LATERAL EPICONDYLITIS OF BOTH ELBOWS: Status: ACTIVE | Noted: 2021-07-09

## 2021-07-30 ENCOUNTER — OFFICE VISIT (OUTPATIENT)
Dept: BEHAVIORAL HEALTH | Facility: CLINIC | Age: 31
End: 2021-07-30
Payer: COMMERCIAL

## 2021-07-30 DIAGNOSIS — F43.12 CHRONIC POST-TRAUMATIC STRESS DISORDER (PTSD): ICD-10-CM

## 2021-07-30 DIAGNOSIS — F33.2 SEVERE EPISODE OF RECURRENT MAJOR DEPRESSIVE DISORDER, WITHOUT PSYCHOTIC FEATURES (HCC): ICD-10-CM

## 2021-07-30 DIAGNOSIS — G90.511 COMPLEX REGIONAL PAIN SYNDROME TYPE 1 OF RIGHT UPPER EXTREMITY: ICD-10-CM

## 2021-07-30 DIAGNOSIS — M79.7 FIBROMYALGIA: ICD-10-CM

## 2021-07-30 PROCEDURE — 99204 OFFICE O/P NEW MOD 45 MIN: CPT | Mod: GC | Performed by: PSYCHIATRY & NEUROLOGY

## 2021-07-30 RX ORDER — TOPIRAMATE 25 MG/1
25 TABLET ORAL DAILY
COMMUNITY
Start: 2021-06-18 | End: 2021-09-09

## 2021-07-30 RX ORDER — DULOXETIN HYDROCHLORIDE 30 MG/1
CAPSULE, DELAYED RELEASE ORAL
Qty: 49 CAPSULE | Refills: 0 | Status: SHIPPED | OUTPATIENT
Start: 2021-07-30 | End: 2021-08-16

## 2021-07-30 ASSESSMENT — ANXIETY QUESTIONNAIRES
7. FEELING AFRAID AS IF SOMETHING AWFUL MIGHT HAPPEN: NOT AT ALL
GAD7 TOTAL SCORE: 13
1. FEELING NERVOUS, ANXIOUS, OR ON EDGE: NEARLY EVERY DAY
2. NOT BEING ABLE TO STOP OR CONTROL WORRYING: MORE THAN HALF THE DAYS
4. TROUBLE RELAXING: NEARLY EVERY DAY
5. BEING SO RESTLESS THAT IT IS HARD TO SIT STILL: SEVERAL DAYS
6. BECOMING EASILY ANNOYED OR IRRITABLE: MORE THAN HALF THE DAYS
IF YOU CHECKED OFF ANY PROBLEMS ON THIS QUESTIONNAIRE, HOW DIFFICULT HAVE THESE PROBLEMS MADE IT FOR YOU TO DO YOUR WORK, TAKE CARE OF THINGS AT HOME, OR GET ALONG WITH OTHER PEOPLE: SOMEWHAT DIFFICULT
3. WORRYING TOO MUCH ABOUT DIFFERENT THINGS: MORE THAN HALF THE DAYS

## 2021-07-30 ASSESSMENT — PATIENT HEALTH QUESTIONNAIRE - PHQ9
SUM OF ALL RESPONSES TO PHQ QUESTIONS 1-9: 19
5. POOR APPETITE OR OVEREATING: 2 - MORE THAN HALF THE DAYS
CLINICAL INTERPRETATION OF PHQ2 SCORE: 4
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
## 2021-07-30 NOTE — PATIENT INSTRUCTIONS
-Stop topamax  -Stop Celexa as follows: decrease dose to 20mg for 1 week, then discontinue  -Start duloxetine as follows: 30mg daily for 1 week then 60mg daily thereafter  -Continue weekly psychotherapy  -Return to Psychiatry in 4 weeks

## 2021-08-16 ENCOUNTER — TELEPHONE (OUTPATIENT)
Dept: BEHAVIORAL HEALTH | Facility: CLINIC | Age: 31
End: 2021-08-16

## 2021-08-16 DIAGNOSIS — F33.2 SEVERE EPISODE OF RECURRENT MAJOR DEPRESSIVE DISORDER, WITHOUT PSYCHOTIC FEATURES (HCC): ICD-10-CM

## 2021-08-16 RX ORDER — NALTREXONE HYDROCHLORIDE 50 MG/1
4.5 TABLET, FILM COATED ORAL DAILY
COMMUNITY
End: 2021-11-11

## 2021-08-16 RX ORDER — DULOXETIN HYDROCHLORIDE 30 MG/1
30 CAPSULE, DELAYED RELEASE ORAL DAILY
Qty: 30 CAPSULE | Refills: 0 | Status: SHIPPED | OUTPATIENT
Start: 2021-08-16 | End: 2021-08-20

## 2021-08-16 NOTE — PROGRESS NOTES
"Spoke with patient via telephone regarding side effects of duloxetine since increasing dose to 60mg. She reports that she is having bad night sweats. She reports that she is very itchy, located all over, no rash. Advised to take some benadryl. She is having some GI side effects of loose stool.     She started low dose naltrexone on Friday 8/13/2021 in the evening, 4.5mg. Once she took both the same day she reports feeling \"knocked out.\" She is agreeable to decreasing duloxetine back down to 30mg and following up later this week.   "

## 2021-08-19 NOTE — PROGRESS NOTES
Left voicemail for patient after [employer]; she reports ongoing night sweats, itching. Advised her to discontinue duloxetine and gave option to start prozac prior to next appointment on 8/30. Will wait for confirmation from patient before sending Rx.

## 2021-08-20 DIAGNOSIS — F33.2 SEVERE EPISODE OF RECURRENT MAJOR DEPRESSIVE DISORDER, WITHOUT PSYCHOTIC FEATURES (HCC): ICD-10-CM

## 2021-08-20 RX ORDER — FLUOXETINE HYDROCHLORIDE 20 MG/1
20 CAPSULE ORAL DAILY
Qty: 30 CAPSULE | Refills: 0 | Status: SHIPPED | OUTPATIENT
Start: 2021-08-20 | End: 2021-08-30 | Stop reason: SDUPTHER

## 2021-08-30 ENCOUNTER — TELEMEDICINE (OUTPATIENT)
Dept: BEHAVIORAL HEALTH | Facility: CLINIC | Age: 31
End: 2021-08-30
Payer: COMMERCIAL

## 2021-08-30 DIAGNOSIS — F43.12 CHRONIC POST-TRAUMATIC STRESS DISORDER (PTSD): ICD-10-CM

## 2021-08-30 DIAGNOSIS — F33.2 SEVERE EPISODE OF RECURRENT MAJOR DEPRESSIVE DISORDER, WITHOUT PSYCHOTIC FEATURES (HCC): ICD-10-CM

## 2021-08-30 PROCEDURE — 99214 OFFICE O/P EST MOD 30 MIN: CPT | Mod: GC,95 | Performed by: PSYCHIATRY & NEUROLOGY

## 2021-08-30 RX ORDER — TIZANIDINE 4 MG/1
4 TABLET ORAL DAILY
COMMUNITY
Start: 2021-08-27 | End: 2021-09-09

## 2021-08-30 RX ORDER — FLUOXETINE HYDROCHLORIDE 20 MG/1
20 CAPSULE ORAL DAILY
Qty: 30 CAPSULE | Refills: 2 | Status: SHIPPED | OUTPATIENT
Start: 2021-08-30 | End: 2021-11-28
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
## 2021-09-09 ENCOUNTER — OFFICE VISIT (OUTPATIENT)
Dept: MEDICAL GROUP | Facility: LAB | Age: 31
End: 2021-09-09
Payer: COMMERCIAL

## 2021-09-09 VITALS
RESPIRATION RATE: 12 BRPM | TEMPERATURE: 98.2 F | WEIGHT: 223 LBS | BODY MASS INDEX: 35 KG/M2 | HEART RATE: 76 BPM | HEIGHT: 67 IN | SYSTOLIC BLOOD PRESSURE: 110 MMHG | OXYGEN SATURATION: 99 % | DIASTOLIC BLOOD PRESSURE: 66 MMHG

## 2021-09-09 DIAGNOSIS — Z23 NEED FOR VACCINATION: ICD-10-CM

## 2021-09-09 DIAGNOSIS — L30.9 DERMATITIS: ICD-10-CM

## 2021-09-09 DIAGNOSIS — F33.2 SEVERE EPISODE OF RECURRENT MAJOR DEPRESSIVE DISORDER, WITHOUT PSYCHOTIC FEATURES (HCC): ICD-10-CM

## 2021-09-09 PROCEDURE — 90471 IMMUNIZATION ADMIN: CPT | Performed by: FAMILY MEDICINE

## 2021-09-09 PROCEDURE — 90715 TDAP VACCINE 7 YRS/> IM: CPT | Performed by: FAMILY MEDICINE

## 2021-09-09 PROCEDURE — 99214 OFFICE O/P EST MOD 30 MIN: CPT | Mod: 25 | Performed by: FAMILY MEDICINE

## 2021-09-09 RX ORDER — TIZANIDINE 4 MG/1
4 TABLET ORAL EVERY 6 HOURS PRN
COMMUNITY
End: 2022-02-18

## 2021-09-09 RX ORDER — TRIAMCINOLONE ACETONIDE 1 MG/G
1 CREAM TOPICAL 2 TIMES DAILY
Qty: 28.4 G | Refills: 0 | Status: SHIPPED | OUTPATIENT
Start: 2021-09-09 | End: 2022-06-22 | Stop reason: SDUPTHER

## 2021-09-09 ASSESSMENT — FIBROSIS 4 INDEX: FIB4 SCORE: 0.29

## 2021-09-09 NOTE — PROGRESS NOTES
"Subjective:     CC: Rash    HPI:   Jaqui is a 31-year-old female with a complex medical history that includes CRPS of right upper extremity, fibromyalgia, optic nerve atrophy and depression who presents today with rash to right wrist.  She has had itchy, red rash to her right wrist for a few weeks.  She is wondering if and possibly was triggered by irritation from her carpal tunnel splint.  She has tried Neosporin and almost cream on it without improvement.    Health Maintenance: Completed    Medications, past medical history, allergies, and social history have been reviewed and updated.    ROS:  See HPI    Objective:       Exam:  /66 (BP Location: Right arm, Patient Position: Sitting, BP Cuff Size: Large adult)   Pulse 76   Temp 36.8 °C (98.2 °F)   Resp 12   Ht 1.702 m (5' 7\")   Wt 101 kg (223 lb)   SpO2 99%   BMI 34.93 kg/m²  Body mass index is 34.93 kg/m².    Constitutional: Alert. Well appearing. No distress.  Skin: Warm, dry.  To the right wrist there is a erythematous, papular rash with scattered excoriations and dry skin.  Eye: Equal, round and reactive to light, conjunctiva clear, lids normal.  ENMT: Moist mucous membranes.   Respiratory: Normal effort.  Neuro: Moves all four extremities. No facial droop.  Psych: Answers questions appropriately. Normal affect and mood.      Assessment & Plan:     31 y.o. female with the following -     1. Dermatitis  Erythematous, papular rash to right wrist.  Possible contact dermatitis secondary to her splint but may also just be secondary to dry/irritated skin.  She will trial Kenalog cream twice daily, can try higher potency if not improving.  - triamcinolone acetonide (KENALOG) 0.1 % Cream; Apply 1 Application topically 2 times a day.  Dispense: 28.4 g; Refill: 0    2. Severe episode of recurrent major depressive disorder, without psychotic features (HCC)  She is following with psychiatry and this is improving with Prozac.    3. Need for vaccination  - Tdap " Vaccine =>6YO IM      Please note that this note was created using voice recognition software.

## 2021-10-13 DIAGNOSIS — F33.2 SEVERE EPISODE OF RECURRENT MAJOR DEPRESSIVE DISORDER, WITHOUT PSYCHOTIC FEATURES (HCC): ICD-10-CM

## 2021-10-21 ENCOUNTER — HOSPITAL ENCOUNTER (OUTPATIENT)
Dept: LAB | Facility: MEDICAL CENTER | Age: 31
End: 2021-10-21
Attending: NURSE PRACTITIONER
Payer: COMMERCIAL

## 2021-10-21 LAB
ALBUMIN SERPL BCP-MCNC: 4.5 G/DL (ref 3.2–4.9)
ALBUMIN/GLOB SERPL: 1.4 G/DL
ALP SERPL-CCNC: 64 U/L (ref 30–99)
ALT SERPL-CCNC: 7 U/L (ref 2–50)
ANION GAP SERPL CALC-SCNC: 12 MMOL/L (ref 7–16)
AST SERPL-CCNC: 8 U/L (ref 12–45)
BILIRUB SERPL-MCNC: 0.2 MG/DL (ref 0.1–1.5)
BUN SERPL-MCNC: 15 MG/DL (ref 8–22)
CALCIUM SERPL-MCNC: 9.8 MG/DL (ref 8.5–10.5)
CHLORIDE SERPL-SCNC: 101 MMOL/L (ref 96–112)
CO2 SERPL-SCNC: 24 MMOL/L (ref 20–33)
CREAT SERPL-MCNC: 0.84 MG/DL (ref 0.5–1.4)
FASTING STATUS PATIENT QL REPORTED: NORMAL
GLOBULIN SER CALC-MCNC: 3.2 G/DL (ref 1.9–3.5)
GLUCOSE SERPL-MCNC: 82 MG/DL (ref 65–99)
POTASSIUM SERPL-SCNC: 4.2 MMOL/L (ref 3.6–5.5)
PROT SERPL-MCNC: 7.7 G/DL (ref 6–8.2)
SODIUM SERPL-SCNC: 137 MMOL/L (ref 135–145)

## 2021-10-21 PROCEDURE — 80053 COMPREHEN METABOLIC PANEL: CPT

## 2021-10-21 PROCEDURE — 36415 COLL VENOUS BLD VENIPUNCTURE: CPT

## 2021-11-11 ENCOUNTER — TELEMEDICINE (OUTPATIENT)
Dept: MEDICAL GROUP | Facility: LAB | Age: 31
End: 2021-11-11
Payer: COMMERCIAL

## 2021-11-11 VITALS — TEMPERATURE: 97.5 F | WEIGHT: 223 LBS | HEIGHT: 66 IN | BODY MASS INDEX: 35.84 KG/M2

## 2021-11-11 DIAGNOSIS — M79.7 FIBROMYALGIA: ICD-10-CM

## 2021-11-11 DIAGNOSIS — G90.511 COMPLEX REGIONAL PAIN SYNDROME TYPE 1 OF RIGHT UPPER EXTREMITY: ICD-10-CM

## 2021-11-11 PROBLEM — F43.12 CHRONIC POST-TRAUMATIC STRESS DISORDER (PTSD): Status: RESOLVED | Noted: 2021-07-30 | Resolved: 2021-11-11

## 2021-11-11 PROCEDURE — 99213 OFFICE O/P EST LOW 20 MIN: CPT | Mod: 95 | Performed by: FAMILY MEDICINE

## 2021-11-11 ASSESSMENT — FIBROSIS 4 INDEX: FIB4 SCORE: 0.38

## 2021-11-11 NOTE — PROGRESS NOTES
Virtual Visit: Established Patient   This visit was conducted via Zoom using secure and encrypted videoconferencing technology.   The patient was in a private location in the state of [place].    The patient's identity was confirmed and verbal consent was obtained for this virtual visit.    Subjective:   CC:   Chief Complaint   Patient presents with   • Paperwork     Jaqui Kumar is a 31 y.o. female presenting discuss paperwork for work.  She needs disability paperwork filled out for her job.  When her fibromyalgia symptoms flare she needs to take Aleve and is used up her FMLA.  He is following with pain management for fibromyalgia as well as right leg paresthesias and complex regional pain syndrome, has had little improvement with a variety of multimodal treatments.    ROS   See HPI    Current medicines (including changes today)  Current Outpatient Medications   Medication Sig Dispense Refill   • tizanidine (ZANAFLEX) 4 MG Tab Take 4 mg by mouth every 6 hours as needed.     • triamcinolone acetonide (KENALOG) 0.1 % Cream Apply 1 Application topically 2 times a day. 28.4 g 0   • FLUoxetine (PROZAC) 20 MG Cap Take 1 Capsule by mouth every day for 90 days. 30 Capsule 2   • lidocaine (LIDODERM) 5 % Patch Apply 1 Patch to skin as directed every 24 hours. 10 Patch 0   • acetaminophen (TYLENOL) 500 MG Tab Take 500-1,000 mg by mouth every 6 hours as needed for Moderate Pain.     • omeprazole (PRILOSEC) 20 MG delayed-release capsule Take 20 mg by mouth every day.  0   • naltrexone (DEPADE) 50 MG Tab Take 4.5 mg by mouth every day. From compounding pharmacy (Patient not taking: Reported on 11/11/2021)       No current facility-administered medications for this visit.       Patient Active Problem List    Diagnosis Date Noted   • Severe episode of recurrent major depressive disorder, without psychotic features (HCC) 08/30/2021   • Chronic post-traumatic stress disorder (PTSD) 07/30/2021   • Fibromyalgia 07/30/2021   •  "Lateral epicondylitis of both elbows 07/09/2021   • Complex regional pain syndrome type 1 of right upper extremity 07/09/2021   • Right leg paresthesias 05/06/2020   • Recurrent major depressive disorder (HCC) 05/06/2020   • Extensor tendon disruption 02/28/2020   • Optic nerve atrophy 01/24/2020   • Bilateral carpal tunnel syndrome 01/24/2020   • Bilateral lumbar radiculopathy 08/06/2019   • Obesity (BMI 30-39.9) 09/22/2017        Objective:   Temp 36.4 °C (97.5 °F)   Ht 1.676 m (5' 6\")   Wt 101 kg (223 lb)   BMI 35.99 kg/m²     Physical Exam:  Constitutional: Alert, no distress, well-groomed.  Skin: No rashes in visible areas.  Eye: Round. Conjunctiva clear, lids normal. No icterus.   ENMT: Lips pink without lesions, good dentition, moist mucous membranes. Phonation normal.  Neck: No masses, no thyromegaly. Moves freely without pain.  Respiratory: Unlabored respiratory effort, no cough or audible wheeze  Psych: Alert and oriented x3, normal affect and mood.     Assessment and Plan:   The following treatment plan was discussed:     1. Fibromyalgia    2. Complex regional pain syndrome type 1 of right upper extremity    Paperwork completed for work today.  She has tried a variety of different treatments with pain management with little or no improvement.  She may consider referral to specialist outside of the area and if needed we can send this.    Follow-up: Return if symptoms worsen or fail to improve.         "

## 2021-11-18 ENCOUNTER — HOSPITAL ENCOUNTER (OUTPATIENT)
Dept: LAB | Facility: MEDICAL CENTER | Age: 31
End: 2021-11-18
Attending: PHYSICIAN ASSISTANT
Payer: COMMERCIAL

## 2021-11-18 PROCEDURE — 87624 HPV HI-RISK TYP POOLED RSLT: CPT

## 2021-11-18 PROCEDURE — 88175 CYTOPATH C/V AUTO FLUID REDO: CPT

## 2021-12-06 ENCOUNTER — TELEMEDICINE (OUTPATIENT)
Dept: BEHAVIORAL HEALTH | Facility: CLINIC | Age: 31
End: 2021-12-06
Payer: COMMERCIAL

## 2021-12-06 DIAGNOSIS — F43.12 CHRONIC POST-TRAUMATIC STRESS DISORDER (PTSD): ICD-10-CM

## 2021-12-06 DIAGNOSIS — F33.2 SEVERE EPISODE OF RECURRENT MAJOR DEPRESSIVE DISORDER, WITHOUT PSYCHOTIC FEATURES (HCC): ICD-10-CM

## 2021-12-06 DIAGNOSIS — G90.511 COMPLEX REGIONAL PAIN SYNDROME TYPE 1 OF RIGHT UPPER EXTREMITY: ICD-10-CM

## 2021-12-06 PROCEDURE — 99214 OFFICE O/P EST MOD 30 MIN: CPT | Mod: 95,GC | Performed by: PSYCHIATRY & NEUROLOGY

## 2021-12-06 RX ORDER — FLUOXETINE HYDROCHLORIDE 20 MG/1
20 CAPSULE ORAL EVERY MORNING
Qty: 90 CAPSULE | Refills: 1 | Status: SHIPPED | OUTPATIENT
Start: 2021-12-06 | End: 2022-02-18 | Stop reason: SDUPTHER
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
## 2021-12-13 ENCOUNTER — TELEMEDICINE (OUTPATIENT)
Dept: BEHAVIORAL HEALTH | Facility: CLINIC | Age: 31
End: 2021-12-13
Payer: COMMERCIAL

## 2021-12-13 DIAGNOSIS — F33.2 SEVERE EPISODE OF RECURRENT MAJOR DEPRESSIVE DISORDER, WITHOUT PSYCHOTIC FEATURES (HCC): ICD-10-CM

## 2021-12-13 PROCEDURE — 90791 PSYCH DIAGNOSTIC EVALUATION: CPT | Performed by: MARRIAGE & FAMILY THERAPIST

## 2021-12-13 NOTE — PROGRESS NOTES
[facility]   Psychotherapy Progress Note        Name: Jaqui Kumar  MRN: [id number]  : 1990  Age: 31 y.o.  Date of assessment: 2021  PCP: Joon Zamudio M.D.  Persons in attendance: {[facility] ATTENDEES:56494428}  Total session time: *** minutes      Topics addressed in psychotherapy include: ***    Objective Observations:   Participation:{[facility] PARTICIPATION MEASURES:22023676}   Grooming:{[facility] GRO[facility]}   Cognition:{[facility] ORIENTATION:10691678}   Eye Contact:{[facility] EYE CONTACT:54184544}   Mood:{[facility] MOOD:82266483}   Affect:{[facility] AFFECT:64903689}   Thought Process:{[facility] THOUGHT PROCESS:31090821}   Speech:{[facility] SPEECH:44039753}    Current Risk:   Suicide: {Desc; low/moderate/high:442710}   Homicide: {Desc; low/moderate/high:657792}   Self-Harm: {Desc; low/moderate/high:150654}   Relapse: {Desc; low/moderate/high:543782}   Safety Plan Reviewed: {Response; yes/no/na:93857}    Care Plan Updated: {Yes/No/WC:026471}    Does patient express agreement with the above plan? {Yes/No/WC:903486}     Diagnosis:  No diagnosis found.    Referral appointment(s) scheduled? {Yes/No/WC:964114}       MILKA Landeros

## 2022-01-12 ENCOUNTER — PATIENT MESSAGE (OUTPATIENT)
Dept: MEDICAL GROUP | Facility: LAB | Age: 32
End: 2022-01-12

## 2022-01-13 NOTE — PATIENT COMMUNICATION
[facility] paperwork faxed today to [phone] along with office notes and lab results.

## 2022-01-13 NOTE — PROGRESS NOTES
She has a diagnosis of both fibromyalgia with right leg paresthesias as well as complex regional pain syndrome of the right upper extremity.  She has followed with multiple specialists including pain management and orthopedics.  Did not see improvement with of right upper extremity with ulnar nerve decompression.    She underwent extensive work-up with neurology for right leg pain and paresthesias.  She has tried multiple medications including Lyrica, gabapentin, nortriptyline, naltrexone.    We will resend referral to [facility].

## 2022-01-17 ENCOUNTER — TELEMEDICINE (OUTPATIENT)
Dept: BEHAVIORAL HEALTH | Facility: CLINIC | Age: 32
End: 2022-01-17
Payer: COMMERCIAL

## 2022-01-17 DIAGNOSIS — F33.2 SEVERE EPISODE OF RECURRENT MAJOR DEPRESSIVE DISORDER, WITHOUT PSYCHOTIC FEATURES (HCC): ICD-10-CM

## 2022-01-17 PROCEDURE — 90837 PSYTX W PT 60 MINUTES: CPT | Mod: 95 | Performed by: MARRIAGE & FAMILY THERAPIST
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
## 2022-01-24 ENCOUNTER — HOSPITAL ENCOUNTER (EMERGENCY)
Facility: MEDICAL CENTER | Age: 32
End: 2022-01-24
Payer: COMMERCIAL

## 2022-01-24 VITALS
OXYGEN SATURATION: 98 % | TEMPERATURE: 97.3 F | RESPIRATION RATE: 18 BRPM | HEART RATE: 96 BPM | WEIGHT: 222.66 LBS | SYSTOLIC BLOOD PRESSURE: 144 MMHG | BODY MASS INDEX: 35.94 KG/M2 | DIASTOLIC BLOOD PRESSURE: 99 MMHG

## 2022-01-24 PROCEDURE — 302449 STATCHG TRIAGE ONLY (STATISTIC)

## 2022-01-24 ASSESSMENT — FIBROSIS 4 INDEX: FIB4 SCORE: 0.38
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
## 2022-02-01 ENCOUNTER — PATIENT MESSAGE (OUTPATIENT)
Dept: MEDICAL GROUP | Facility: LAB | Age: 32
End: 2022-02-01

## 2022-02-01 DIAGNOSIS — M79.7 FIBROMYALGIA: ICD-10-CM

## 2022-02-01 DIAGNOSIS — G90.511 COMPLEX REGIONAL PAIN SYNDROME TYPE 1 OF RIGHT UPPER EXTREMITY: ICD-10-CM

## 2022-02-07 ENCOUNTER — OFFICE VISIT (OUTPATIENT)
Dept: BEHAVIORAL HEALTH | Facility: CLINIC | Age: 32
End: 2022-02-07
Payer: COMMERCIAL

## 2022-02-07 DIAGNOSIS — F33.2 SEVERE EPISODE OF RECURRENT MAJOR DEPRESSIVE DISORDER, WITHOUT PSYCHOTIC FEATURES (HCC): ICD-10-CM

## 2022-02-07 DIAGNOSIS — F43.12 CHRONIC POST-TRAUMATIC STRESS DISORDER (PTSD): ICD-10-CM

## 2022-02-07 PROCEDURE — 90837 PSYTX W PT 60 MINUTES: CPT | Performed by: MARRIAGE & FAMILY THERAPIST
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
## 2022-02-18 ENCOUNTER — TELEMEDICINE (OUTPATIENT)
Dept: BEHAVIORAL HEALTH | Facility: CLINIC | Age: 32
End: 2022-02-18
Payer: COMMERCIAL

## 2022-02-18 DIAGNOSIS — F33.42 RECURRENT MAJOR DEPRESSIVE DISORDER, IN FULL REMISSION (HCC): ICD-10-CM

## 2022-02-18 DIAGNOSIS — F33.2 SEVERE EPISODE OF RECURRENT MAJOR DEPRESSIVE DISORDER, WITHOUT PSYCHOTIC FEATURES (HCC): ICD-10-CM

## 2022-02-18 PROCEDURE — 99213 OFFICE O/P EST LOW 20 MIN: CPT | Mod: 95,GC | Performed by: PSYCHIATRY & NEUROLOGY

## 2022-02-18 RX ORDER — BACLOFEN 5 MG/1
5 TABLET ORAL 2 TIMES DAILY PRN
COMMUNITY
Start: 2022-01-31 | End: 2022-04-21

## 2022-02-18 RX ORDER — FLUOXETINE HYDROCHLORIDE 20 MG/1
20 CAPSULE ORAL EVERY MORNING
Qty: 90 CAPSULE | Refills: 1 | Status: SHIPPED | OUTPATIENT
Start: 2022-02-18 | End: 2022-09-14 | Stop reason: SDUPTHER

## 2022-02-18 RX ORDER — SEGESTERONE ACETATE AND ETHINYL ESTRADIOL 103; 17.4 MG/1; MG/1
RING VAGINAL
COMMUNITY
Start: 2022-01-10 | End: 2023-02-07

## 2022-02-18 RX ORDER — BACLOFEN 10 MG/1
10 TABLET ORAL
COMMUNITY
Start: 2022-02-14 | End: 2022-04-21 | Stop reason: SDUPTHER

## 2022-02-18 ASSESSMENT — PATIENT HEALTH QUESTIONNAIRE - PHQ9
CLINICAL INTERPRETATION OF PHQ2 SCORE: 0
5. POOR APPETITE OR OVEREATING: 0 - NOT AT ALL
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
## 2022-03-05 NOTE — ANESTHESIA TIME REPORT
Anesthesia Start and Stop Event Times     Date Time Event    3/2/2021 0654 Ready for Procedure     0725 Anesthesia Start     0904 Anesthesia Stop        Responsible Staff  03/02/21    Name Role Begin End    Mayco Lara D.O. Anesth 0725 0904        Preop Diagnosis (Free Text):  Pre-op Diagnosis     CARPAL TUNNEL SYNDROME RIGHT, ULNAR NERVE ENTRAPMENT RIGHT, RADIAL NERVE TUNNEL SYNDROME        Preop Diagnosis (Codes):    Post op Diagnosis  Carpal tunnel syndrome, right      Premium Reason  Non-Premium    Comments:                                                                       
palpitations

## 2022-04-01 ENCOUNTER — TELEMEDICINE (OUTPATIENT)
Dept: BEHAVIORAL HEALTH | Facility: CLINIC | Age: 32
End: 2022-04-01
Payer: COMMERCIAL

## 2022-04-01 DIAGNOSIS — F33.42 RECURRENT MAJOR DEPRESSIVE DISORDER, IN FULL REMISSION (HCC): ICD-10-CM

## 2022-04-01 PROCEDURE — 90837 PSYTX W PT 60 MINUTES: CPT | Mod: GT | Performed by: MARRIAGE & FAMILY THERAPIST

## 2022-04-01 NOTE — PROGRESS NOTES
[facility]   Therapy Progress Note      This visit was conducted via Zoom using secure and encrypted videoconferencing technology.  The patient was in a private location in the [facility].  The patient's identity was confirmed and verbal consent was obtained for this virtual visit.  Place of Service: POS 02 - Virtual visits from a location other than the patient's Home    Name: Jaqui Kumar  MRN: [id number]  : 1990  Age: 32 y.o.  Date of assessment: 2022  PCP: Joon Zamudio M.D.  Persons in attendance: Patient  Total session time: 55 minutes    Objective Observations:   Participation:Active verbal participation, Attentive and Engaged   Grooming:Casual   Cognition:Alert and Fully Oriented   Eye Contact:Good   Mood:Anxious   Affect:Flexible and Full range   Thought Process:Logical and Goal-directed   Speech:Rate within normal limits and Volume within normal limits    Current Risk:   Suicide: low   Homicide: low   Self-Harm: low   Relapse: low   Safety Plan Reviewed: not applicable    Topics addressed in psychotherapy include: Met with the patient via zoom for an individual counseling session. The patient discussed over exerting herself while working on [employer].  Worked with patient on identifying areas where she agrees to things that are not in her best interest.  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  Worked with the patient on identifying her feelings of wanting to be needed and liked as part of her trauma response.  Worked with the patient on identifying ways that she can care for herself and put herself first.    This dictation has been created using voice recognition software and/or scribes. The accuracy of the dictation is limited by the abilities of the software and the expertise of the scribes. I expect there may be some errors of grammar and possibly content. I made every attempt to manually correct the errors within my dictation. However, errors  related to voice recognition software and/or scribes may still exist and should be interpreted within the appropriate context.    Care Plan Updated: No    Does patient express agreement with the above plan? Yes     Diagnosis:  1. Recurrent major depressive disorder, in full remission (HCC)        Referral appointment(s) scheduled? No       LENA Landeros.

## 2022-04-18 ENCOUNTER — OFFICE VISIT (OUTPATIENT)
Dept: BEHAVIORAL HEALTH | Facility: CLINIC | Age: 32
End: 2022-04-18
Payer: COMMERCIAL

## 2022-04-18 DIAGNOSIS — F33.42 RECURRENT MAJOR DEPRESSIVE DISORDER, IN FULL REMISSION (HCC): ICD-10-CM

## 2022-04-18 PROCEDURE — 90837 PSYTX W PT 60 MINUTES: CPT | Performed by: MARRIAGE & FAMILY THERAPIST

## 2022-04-18 NOTE — PROGRESS NOTES
[facility]   Therapy Progress Note    This session took place on 2022 at 011:00 AM, due to paperwork the note is being entered on this date 2022.    Name: Jaqui Kumar  MRN: [id number]  : 1990  Age: 32 y.o.  Date of assessment: 2022  PCP: Joon Zamudio M.D.  Persons in attendance: Patient  Total session time: 55 minutes      Topics addressed in psychotherapy include: Met with the patient in office for an individual counseling session.    The patient appear to be seeking significant validation that she was not “crazy”.  She reports interpersonal relationship conflicts with individuals at work and in the [employer].  The patient reports that she wears loud and flashy clothing however is always modest.  Worked with the patient to identify levels of emotion.  Discussed emotional mind and rational mind and wise mind.  Worked with patient in accepting her levels of emotion and that not everyone shares those same outlooks.  Validated the patient’s feelings throughout the session.    This dictation has been created using voice recognition software and/or scribes. The accuracy of the dictation is limited by the abilities of the software and the expertise of the scribes. I expect there may be some errors of grammar and possibly content. I made every attempt to manually correct the errors within my dictation. However, errors related to voice recognition software and/or scribes may still exist and should be interpreted within the appropriate context.    Objective Observations:   Participation:Active verbal participation, Attentive and Engaged   Grooming:Casual   Cognition:Alert and Fully Oriented   Eye Contact:Good   Mood:Euthymic   Affect:Flexible, Full range and Congruent with content   Thought Process:Logical and Goal-directed   Speech:Rate within normal limits and Volume within normal limits    Current Risk:   Suicide: low   Homicide: low   Self-Harm:  low   Relapse: low   Safety Plan Reviewed: not applicable    Care Plan Updated: No    Does patient express agreement with the above plan? Yes     Diagnosis:  1. Recurrent major depressive disorder, in full remission (HCC)        Referral appointment(s) scheduled? MILKA Holland

## 2022-04-21 ENCOUNTER — TELEMEDICINE (OUTPATIENT)
Dept: MEDICAL GROUP | Facility: LAB | Age: 32
End: 2022-04-21
Payer: COMMERCIAL

## 2022-04-21 VITALS — HEIGHT: 66 IN | WEIGHT: 223 LBS | BODY MASS INDEX: 35.84 KG/M2

## 2022-04-21 DIAGNOSIS — K21.9 GASTROESOPHAGEAL REFLUX DISEASE, UNSPECIFIED WHETHER ESOPHAGITIS PRESENT: ICD-10-CM

## 2022-04-21 DIAGNOSIS — R19.5 LOOSE STOOLS: ICD-10-CM

## 2022-04-21 DIAGNOSIS — G90.511 COMPLEX REGIONAL PAIN SYNDROME TYPE 1 OF RIGHT UPPER EXTREMITY: ICD-10-CM

## 2022-04-21 DIAGNOSIS — M79.7 FIBROMYALGIA: ICD-10-CM

## 2022-04-21 PROCEDURE — 99214 OFFICE O/P EST MOD 30 MIN: CPT | Mod: 95 | Performed by: FAMILY MEDICINE

## 2022-04-21 RX ORDER — BACLOFEN 10 MG/1
10 TABLET ORAL
Qty: 90 TABLET | Refills: 3 | Status: SHIPPED | OUTPATIENT
Start: 2022-04-21 | End: 2022-10-12

## 2022-04-21 ASSESSMENT — FIBROSIS 4 INDEX: FIB4 SCORE: 0.39

## 2022-04-21 NOTE — PROGRESS NOTES
Virtual Visit: Established Patient   This visit was conducted via Zoom using secure and encrypted videoconferencing technology.   The patient was in a private location outside of their home in the state of [place].    The patient's identity was confirmed and verbal consent was obtained for this virtual visit.    Subjective:   CC:   Chief Complaint   Patient presents with   • Follow-Up     Carolina Kumar is a 32 y.o. female complex medical history including fibromyalgia, CRPS, paresthesias, optic nerve atrophy and depression who presents for follow-up.      She has had extensive work-ups in the past with multiple specialties including neurology, pain management, and orthopedics.  Multiple interventions including epidural and medications (TCAs, gabapentin, Lyrica, antidepressants, Topamax) without lasting improvement.    Recently had visit with multiple providers at [facility].  Most recommendations from [facility] centered on trauma therapy, pain psychology, and approaching from mental health side.  She does continue to follow with a psychiatrist and therapist here.    She has had some loose stools for about 2 months, did start a magnesium supplement at night for help with muscle aches.    Reflux symptoms are worsening, often these are triggered by diet or if she misses her PPI.  No trouble swallowing, melena, blood in stool.    ROS   See HPI    Current medicines (including changes today)  Current Outpatient Medications   Medication Sig Dispense Refill   • baclofen (LIORESAL) 5 MG Tab Take 5 mg by mouth 2 times a day as needed. PAIN     • baclofen (LIORESAL) 10 MG Tab Take 10 mg by mouth at bedtime as needed.     • ANNOVERA 0.013-0.15 MG/24HR RING      • FLUoxetine (PROZAC) 20 MG Cap Take 1 Capsule by mouth every morning. 90 Capsule 1   • triamcinolone acetonide (KENALOG) 0.1 % Cream Apply 1 Application topically 2 times a day. 28.4 g 0   • lidocaine (LIDODERM) 5 % Patch Apply  "1 Patch to skin as directed every 24 hours. 10 Patch 0   • acetaminophen (TYLENOL) 500 MG Tab Take 500-1,000 mg by mouth every 6 hours as needed for Moderate Pain.     • omeprazole (PRILOSEC) 20 MG delayed-release capsule Take 20 mg by mouth every day.  0     No current facility-administered medications for this visit.       Patient Active Problem List    Diagnosis Date Noted   • Severe episode of recurrent major depressive disorder, without psychotic features (HCC) 08/30/2021   • Fibromyalgia 07/30/2021   • Lateral epicondylitis of both elbows 07/09/2021   • Complex regional pain syndrome type 1 of right upper extremity 07/09/2021   • Right leg paresthesias 05/06/2020   • Recurrent major depressive disorder (HCC) 05/06/2020   • Extensor tendon disruption 02/28/2020   • Optic nerve atrophy 01/24/2020   • Bilateral carpal tunnel syndrome 01/24/2020   • Bilateral lumbar radiculopathy 08/06/2019   • Obesity (BMI 30-39.9) 09/22/2017        Objective:   Ht 1.676 m (5' 6\") Comment: Verbal  Wt 101 kg (223 lb) Comment: Verbal  BMI 35.99 kg/m²     Physical Exam:  Constitutional: Alert, no distress, well-groomed.  Skin: No rashes in visible areas.  Eye: Round. Conjunctiva clear, lids normal. No icterus.   ENMT: Lips pink without lesions, good dentition, moist mucous membranes. Phonation normal.  Neck: No masses, no thyromegaly. Moves freely without pain.  Respiratory: Unlabored respiratory effort, no cough or audible wheeze  Psych: Alert and oriented x3, normal affect and mood.     Assessment and Plan:   The following treatment plan was discussed:     1. Gastroesophageal reflux disease, unspecified whether esophagitis present  Increase Prilosec to 40 mg daily for 1 month, continue to work on dietary changes.  Discussed seeking care for red flag symptoms.    2. Complex regional pain syndrome type 1 of right upper extremity  3. Fibromyalgia  She has had extensive work-ups in the past with multiple specialties including " neurology, pain management, and orthopedics.  Multiple interventions including epidural and medications (TCAs, gabapentin, Lyrica, antidepressants, Topamax) without lasting improvement.  She will discuss recent recommendations from [place] regarding therapy and mental health approach with her mental health providers.  She is established with neurology her and pans to schedule a follow-up visit.  - baclofen (LIORESAL) 10 MG Tab; Take 1 Tablet by mouth at bedtime as needed.  Dispense: 90 Tablet; Refill: 3    4. Loose stools  Likely secondary to magnesium supplement.  Discussed stopping or lowering dose.    Follow-up: Return if symptoms worsen or fail to improve.

## 2022-05-27 ENCOUNTER — TELEMEDICINE (OUTPATIENT)
Dept: BEHAVIORAL HEALTH | Facility: CLINIC | Age: 32
End: 2022-05-27
Payer: COMMERCIAL

## 2022-05-27 DIAGNOSIS — F33.1 MODERATE EPISODE OF RECURRENT MAJOR DEPRESSIVE DISORDER (HCC): ICD-10-CM

## 2022-05-27 PROCEDURE — 90837 PSYTX W PT 60 MINUTES: CPT | Mod: 95 | Performed by: MARRIAGE & FAMILY THERAPIST
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
## 2022-06-13 ENCOUNTER — TELEMEDICINE (OUTPATIENT)
Dept: BEHAVIORAL HEALTH | Facility: CLINIC | Age: 32
End: 2022-06-13
Payer: COMMERCIAL

## 2022-06-13 DIAGNOSIS — F33.2 SEVERE EPISODE OF RECURRENT MAJOR DEPRESSIVE DISORDER, WITHOUT PSYCHOTIC FEATURES (HCC): ICD-10-CM

## 2022-06-13 PROBLEM — F32.1 CURRENT MODERATE EPISODE OF MAJOR DEPRESSIVE DISORDER (HCC): Status: ACTIVE | Noted: 2020-05-06

## 2022-06-13 PROCEDURE — 90837 PSYTX W PT 60 MINUTES: CPT | Mod: 95 | Performed by: MARRIAGE & FAMILY THERAPIST
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
## 2022-06-22 ENCOUNTER — PATIENT MESSAGE (OUTPATIENT)
Dept: MEDICAL GROUP | Facility: LAB | Age: 32
End: 2022-06-22

## 2022-06-22 DIAGNOSIS — L30.9 DERMATITIS: ICD-10-CM

## 2022-06-22 RX ORDER — TRIAMCINOLONE ACETONIDE 1 MG/G
1 CREAM TOPICAL 2 TIMES DAILY
Qty: 28.4 G | Refills: 1 | Status: SHIPPED | OUTPATIENT
Start: 2022-06-22

## 2022-06-22 NOTE — TELEPHONE ENCOUNTER
Received request via: Patient    Was the patient seen in the last year in this department? Yes  LOV 04/21/2022 - Telemedicine  Does the patient have an active prescription (recently filled or refills available) for medication(s) requested? No     Can we refill the itchy cream stuff??

## 2022-06-22 NOTE — LETTER
August 19, 2022    To Whom It May Concern:         This is confirmation that Jaqui Kumar requires a more supportive chair due to chronic medical issues including multiple chronic pains.         If you have any questions please do not hesitate to call me at the phone number listed below.    Sincerely,          Joon Zamudio M.D.  [phone]                  
3

## 2022-06-27 ENCOUNTER — TELEMEDICINE (OUTPATIENT)
Dept: BEHAVIORAL HEALTH | Facility: CLINIC | Age: 32
End: 2022-06-27
Payer: COMMERCIAL

## 2022-06-27 DIAGNOSIS — F33.1 MODERATE EPISODE OF RECURRENT MAJOR DEPRESSIVE DISORDER (HCC): ICD-10-CM

## 2022-06-27 PROCEDURE — 90837 PSYTX W PT 60 MINUTES: CPT | Mod: 95 | Performed by: MARRIAGE & FAMILY THERAPIST
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
## 2022-07-28 ENCOUNTER — OFFICE VISIT (OUTPATIENT)
Dept: URGENT CARE | Facility: CLINIC | Age: 32
End: 2022-07-28
Payer: COMMERCIAL

## 2022-07-28 ENCOUNTER — TELEPHONE (OUTPATIENT)
Dept: MEDICAL GROUP | Facility: LAB | Age: 32
End: 2022-07-28

## 2022-07-28 VITALS
BODY MASS INDEX: 36.96 KG/M2 | RESPIRATION RATE: 12 BRPM | WEIGHT: 230 LBS | TEMPERATURE: 98.5 F | HEIGHT: 66 IN | DIASTOLIC BLOOD PRESSURE: 72 MMHG | HEART RATE: 76 BPM | SYSTOLIC BLOOD PRESSURE: 126 MMHG | OXYGEN SATURATION: 97 %

## 2022-07-28 DIAGNOSIS — T39.395A ADVERSE EFFECT OF NON-STEROIDAL ANTI-INFLAMMATORY DRUG (NSAID), INITIAL ENCOUNTER: ICD-10-CM

## 2022-07-28 PROCEDURE — 99213 OFFICE O/P EST LOW 20 MIN: CPT | Performed by: FAMILY MEDICINE

## 2022-07-28 ASSESSMENT — FIBROSIS 4 INDEX: FIB4 SCORE: 0.39

## 2022-07-28 NOTE — TELEPHONE ENCOUNTER
Pt advised per PCP to stop medication and be seen. We did not have any openings. Pt is going to [facility]. When I spoke to the pt she said she was in a lot of pain

## 2022-07-28 NOTE — TELEPHONE ENCOUNTER
1. Caller Name: Jaqui Kumar                        Call Back Number: [phone] (home)         How would the patient prefer to be contacted with a response:     Pt was given [facility] from [facility] and she feels she is having a reaction. She does not feel good and sweating. She should not be taken Ansaid due to having a sleeve procedure. Please advise

## 2022-07-28 NOTE — PROGRESS NOTES
"  Subjective:      32 y.o. female presents to urgent care for adverse reaction.  She took meloxicam 15 mg around 9:00 this morning with food, and has since been feeling unwell.  This is her first time taking meloxicam.  She states she is alternating between hot and cold, constant abdominal cramping, and dry mouth.  No issues with breathing.  She endorses numbness to the roof of her mouth, but otherwise no numbness, tingling, or swelling to mouth/lips.  She has a history of gastric sleeve performed in 2017.  No prior history of GI bleeds or cardiovascular disease.    She denies any other questions or concerns at this time.    Current problem list, medication, and past medical/surgical history were reviewed in Epic.    ROS  See HPI     Objective:      /72   Pulse 76   Temp 36.9 °C (98.5 °F) (Temporal)   Resp 12   Ht 1.676 m (5' 6\")   Wt 104 kg (230 lb)   SpO2 97%   BMI 37.12 kg/m²     Physical Exam  Constitutional:       General: She is not in acute distress.     Appearance: She is not diaphoretic.   HENT:      Mouth/Throat:      Lips: Pink.      Mouth: Mucous membranes are moist.      Tongue: Tongue does not deviate from midline.      Palate: No lesions.      Pharynx: Uvula midline.   Cardiovascular:      Rate and Rhythm: Normal rate and regular rhythm.      Heart sounds: Normal heart sounds.   Pulmonary:      Effort: Pulmonary effort is normal. No respiratory distress.      Breath sounds: Normal breath sounds.   Abdominal:      General: Bowel sounds are normal.      Palpations: Abdomen is soft.      Tenderness: There is no abdominal tenderness.   Skin:     Findings: No rash.   Neurological:      Mental Status: She is alert.   Psychiatric:         Mood and Affect: Affect normal.         Judgment: Judgment normal.       Assessment/Plan:     1. Adverse effect of non-steroidal anti-inflammatory drug (NSAID), initial encounter  Patient appears to be having adverse reaction to meloxicam.  She was instructed " to stop use of this medication.  No signs of anaphylaxis, I do not believe she would benefit from an antihistamine or steroids at this time.  She is given strict ED precautions should she develop swelling or numbness/tingling to her mouth, with difficulty speaking or swallowing.  Half-life of Mobic is 20 hours.    Instructed to return to Urgent Care or nearest Emergency Department if symptoms fail to improve, for any change in condition, further concerns, or new concerning symptoms. Patient states understanding of the plan of care and discharge instructions.    Sherlyn Gabriel M.D.

## 2022-07-28 NOTE — LETTER
July 28, 2022    To Whom It May Concern:         This is confirmation that Jaqui Stiles Debrasamuels attended her scheduled appointment with Sherlyn Gabriel M.D. on 7/28/22.  She may return to work tomorrow without any restrictions.         If you have any questions please do not hesitate to call me at the phone number listed below.    Sincerely,          Sherlyn Gabriel M.D.  [phone]

## 2022-07-29 ENCOUNTER — TELEMEDICINE (OUTPATIENT)
Dept: BEHAVIORAL HEALTH | Facility: CLINIC | Age: 32
End: 2022-07-29
Payer: COMMERCIAL

## 2022-07-29 DIAGNOSIS — F33.1 MODERATE EPISODE OF RECURRENT MAJOR DEPRESSIVE DISORDER (HCC): ICD-10-CM

## 2022-07-29 PROCEDURE — 90837 PSYTX W PT 60 MINUTES: CPT | Mod: 95 | Performed by: MARRIAGE & FAMILY THERAPIST

## 2022-07-29 NOTE — PROGRESS NOTES
[facility]   Therapy Progress Note      This visit was conducted via Zoom using secure and encrypted videoconferencing technology.  The patient was in a private location in the [facility].  The patient's identity was confirmed and verbal consent was obtained for this virtual visit.  Place of Service:  POS 02 - Virtual visits from a location other than the patient's Home    Name: Jaqui Kumar  MRN: [id number]  : 1990  Age: 32 y.o.  Date of assessment: 2022  PCP: Joon Zamudio M.D.  Persons in attendance: Patient  Total session time: 55 minutes    Objective Observations:   Participation:Active verbal participation and distracted   Grooming:Casual   Cognition:Alert and Fully Oriented   Eye Contact:Good   Mood:Anxious   Affect:Flexible and Full range   Thought Process:Logical and Goal-directed   Speech:Rate within normal limits and Volume within normal limits    Current Risk:   Suicide: low   Homicide: low   Self-Harm: low   Relapse: low   Safety Plan Reviewed: not applicable    Topics addressed in psychotherapy include: Met the patient via zoom for an individual counseling session.  The patient discussed her long history of self-harm.  It appears that discussing this issue was new for her.  Worked with patient understand those self-harm in behaviors as well as providing coping mechanisms to limit current self-harm.  Worked with patient on breathing and relaxation skills in addition to providing supportive psychotherapy.      This dictation has been created using voice recognition software and/or scribes. The accuracy of the dictation is limited by the abilities of the software and the expertise of the scribes. I expect there may be some errors of grammar and possibly content. I made every attempt to manually correct the errors within my dictation. However, errors related to voice recognition software and/or scribes may still exist and should be interpreted within the  appropriate context.    Care Plan Updated: No    Does patient express agreement with the above plan? Yes     Diagnosis:  No diagnosis found.    Referral appointment(s) scheduled? No       LENA Landeros.

## 2022-08-01 PROBLEM — F33.2 SEVERE EPISODE OF RECURRENT MAJOR DEPRESSIVE DISORDER, WITHOUT PSYCHOTIC FEATURES (HCC): Status: RESOLVED | Noted: 2021-08-30 | Resolved: 2022-08-01

## 2022-08-30 ENCOUNTER — TELEMEDICINE (OUTPATIENT)
Dept: BEHAVIORAL HEALTH | Facility: CLINIC | Age: 32
End: 2022-08-30
Payer: COMMERCIAL

## 2022-08-30 DIAGNOSIS — F33.1 MODERATE EPISODE OF RECURRENT MAJOR DEPRESSIVE DISORDER (HCC): ICD-10-CM

## 2022-08-30 PROCEDURE — 90837 PSYTX W PT 60 MINUTES: CPT | Mod: 95 | Performed by: MARRIAGE & FAMILY THERAPIST
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
## 2022-09-12 ENCOUNTER — TELEMEDICINE (OUTPATIENT)
Dept: BEHAVIORAL HEALTH | Facility: CLINIC | Age: 32
End: 2022-09-12
Payer: COMMERCIAL

## 2022-09-12 DIAGNOSIS — F33.1 MODERATE EPISODE OF RECURRENT MAJOR DEPRESSIVE DISORDER (HCC): ICD-10-CM

## 2022-09-12 PROCEDURE — 90837 PSYTX W PT 60 MINUTES: CPT | Mod: 95 | Performed by: MARRIAGE & FAMILY THERAPIST

## 2022-09-12 NOTE — PROGRESS NOTES
"[facility]   Therapy Progress Note      This visit was conducted via Zoom using secure and encrypted videoconferencing technology.  The patient was in a private location in the [facility].  The patient's identity was confirmed and verbal consent was obtained for this virtual visit.  Place of Service: POS 10 -The patient is at Home during their visit        Name: Jaqui Kumar  MRN: [id number]  : 1990  Age: 32 y.o.  Date of assessment: 2022  PCP: Joon Zamudio M.D.  Persons in attendance: Patient  Total session time: 55 minutes    Objective Observations:   Participation:Active verbal participation, Attentive, and Engaged   Grooming:Casual   Cognition:Alert and Fully Oriented   Eye Contact:Limited   Mood:Anxious   Affect:Flexible and Full range   Thought Process:Logical and Goal-directed   Speech:Rate within normal limits and Volume within normal limits    Current Risk:   Suicide: low   Homicide: low   Self-Harm: low   Relapse: low   Safety Plan Reviewed: not applicable    Topics addressed in psychotherapy include: Met with the patient via zoom for an individual counseling session.  The patient reported that she has migraine headache today. She stated that people at work keep startling her. Patient discussed times not wanting to deal with people. Patient discussed a friends struggles with mental health as it related to her own mental health. Patient appeared to have difficulty in remaining on topic. Discussed \"When will you expect the very best for yourself\".  Worked with the patient on decision journaling.     Care Plan Updated: No    Does patient express agreement with the above plan? Yes     Diagnosis:  1. Moderate episode of recurrent major depressive disorder (HCC)        Referral appointment(s) scheduled? No       MILKA Landeros    "

## 2022-09-14 ENCOUNTER — PATIENT MESSAGE (OUTPATIENT)
Dept: MEDICAL GROUP | Facility: LAB | Age: 32
End: 2022-09-14
Payer: COMMERCIAL

## 2022-09-14 DIAGNOSIS — F33.2 SEVERE EPISODE OF RECURRENT MAJOR DEPRESSIVE DISORDER, WITHOUT PSYCHOTIC FEATURES (HCC): ICD-10-CM

## 2022-09-14 RX ORDER — FLUOXETINE HYDROCHLORIDE 20 MG/1
20 CAPSULE ORAL EVERY MORNING
Qty: 90 CAPSULE | Refills: 3 | Status: SHIPPED | OUTPATIENT
Start: 2022-09-14 | End: 2022-10-12

## 2022-09-15 ENCOUNTER — HOSPITAL ENCOUNTER (OUTPATIENT)
Dept: LAB | Facility: MEDICAL CENTER | Age: 32
End: 2022-09-15
Attending: PHYSICIAN ASSISTANT
Payer: COMMERCIAL

## 2022-09-15 PROCEDURE — 88175 CYTOPATH C/V AUTO FLUID REDO: CPT

## 2022-09-15 PROCEDURE — 87624 HPV HI-RISK TYP POOLED RSLT: CPT

## 2022-09-15 PROCEDURE — 87591 N.GONORRHOEAE DNA AMP PROB: CPT

## 2022-09-15 PROCEDURE — 87491 CHLMYD TRACH DNA AMP PROBE: CPT

## 2022-09-18 LAB
C TRACH DNA GENITAL QL NAA+PROBE: NEGATIVE
CYTOLOGY REG CYTOL: ABNORMAL
HPV HR 12 DNA CVX QL NAA+PROBE: POSITIVE
HPV16 DNA SPEC QL NAA+PROBE: NEGATIVE
HPV18 DNA SPEC QL NAA+PROBE: NEGATIVE
N GONORRHOEA DNA GENITAL QL NAA+PROBE: NEGATIVE
SPECIMEN SOURCE: ABNORMAL
SPECIMEN SOURCE: ABNORMAL

## 2022-09-22 ENCOUNTER — TELEMEDICINE (OUTPATIENT)
Dept: BEHAVIORAL HEALTH | Facility: CLINIC | Age: 32
End: 2022-09-22
Payer: COMMERCIAL

## 2022-09-22 DIAGNOSIS — F33.1 MODERATE EPISODE OF RECURRENT MAJOR DEPRESSIVE DISORDER (HCC): ICD-10-CM

## 2022-09-22 PROCEDURE — 90837 PSYTX W PT 60 MINUTES: CPT | Mod: 95 | Performed by: MARRIAGE & FAMILY THERAPIST

## 2022-09-22 NOTE — PROGRESS NOTES
[facility]   Therapy Progress Note      This visit was conducted via Zoom using secure and encrypted videoconferencing technology.  The patient was in a private location in the [facility].  The patient's identity was confirmed and verbal consent was obtained for this virtual visit.  Place of Service:    POS 02 - Virtual visits from a location other than the patient's Home    Name: Jaqui Kumar  MRN: [id number]  : 1990  Age: 32 y.o.  Date of assessment: 2022  PCP: Joon Zamudio M.D.  Persons in attendance: Patient  Total session time: 55 minutes    Objective Observations:   Participation:Active verbal participation, Attentive, and Engaged   Grooming:Casual   Cognition:Alert and Fully Oriented   Eye Contact:Good   Mood:Anxious and Angry   Affect:Flexible, Full range, Sad, and Angry   Thought Process:Logical and Goal-directed   Speech:Rate within normal limits and Volume within normal limits    Current Risk:   Suicide: low   Homicide: low   Self-Harm: low   Relapse: low   Safety Plan Reviewed: not applicable    Topics addressed in psychotherapy include: Patient reported that she found out recently that she has HPV from her last and only relationship. Patient reports that her mother  from cancer. Discussed patients trauma history and trauma responses. Patient discussed, in derogatory terms, her feelings about him. Discussed differing life views and attitudes.     Patient is concerned about [facility] changes in insurance.     Care Plan Updated: No    Does patient express agreement with the above plan? Yes     Diagnosis:  1. Moderate episode of recurrent major depressive disorder (HCC)        Referral appointment(s) scheduled? No       MILKA Landeros
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
## 2022-10-12 ENCOUNTER — TELEMEDICINE (OUTPATIENT)
Dept: BEHAVIORAL HEALTH | Facility: CLINIC | Age: 32
End: 2022-10-12
Payer: COMMERCIAL

## 2022-10-12 ENCOUNTER — OFFICE VISIT (OUTPATIENT)
Dept: MEDICAL GROUP | Facility: LAB | Age: 32
End: 2022-10-12
Payer: COMMERCIAL

## 2022-10-12 ENCOUNTER — HOSPITAL ENCOUNTER (OUTPATIENT)
Dept: LAB | Facility: MEDICAL CENTER | Age: 32
End: 2022-10-12
Attending: FAMILY MEDICINE
Payer: COMMERCIAL

## 2022-10-12 VITALS
RESPIRATION RATE: 14 BRPM | SYSTOLIC BLOOD PRESSURE: 118 MMHG | HEIGHT: 66 IN | HEART RATE: 78 BPM | OXYGEN SATURATION: 97 % | DIASTOLIC BLOOD PRESSURE: 70 MMHG | BODY MASS INDEX: 41.56 KG/M2 | TEMPERATURE: 98.8 F | WEIGHT: 258.6 LBS

## 2022-10-12 DIAGNOSIS — R55 VASOVAGAL SYNCOPE: ICD-10-CM

## 2022-10-12 DIAGNOSIS — F43.12 CHRONIC POST-TRAUMATIC STRESS DISORDER (PTSD): ICD-10-CM

## 2022-10-12 DIAGNOSIS — F33.1 MODERATE EPISODE OF RECURRENT MAJOR DEPRESSIVE DISORDER (HCC): ICD-10-CM

## 2022-10-12 LAB
ALBUMIN SERPL BCP-MCNC: 4 G/DL (ref 3.2–4.9)
ALBUMIN/GLOB SERPL: 1.2 G/DL
ALP SERPL-CCNC: 61 U/L (ref 30–99)
ALT SERPL-CCNC: 8 U/L (ref 2–50)
ANION GAP SERPL CALC-SCNC: 12 MMOL/L (ref 7–16)
AST SERPL-CCNC: 11 U/L (ref 12–45)
BASOPHILS # BLD AUTO: 0.5 % (ref 0–1.8)
BASOPHILS # BLD: 0.04 K/UL (ref 0–0.12)
BILIRUB SERPL-MCNC: <0.2 MG/DL (ref 0.1–1.5)
BUN SERPL-MCNC: 15 MG/DL (ref 8–22)
CALCIUM SERPL-MCNC: 8.9 MG/DL (ref 8.5–10.5)
CHLORIDE SERPL-SCNC: 105 MMOL/L (ref 96–112)
CO2 SERPL-SCNC: 21 MMOL/L (ref 20–33)
CREAT SERPL-MCNC: 0.7 MG/DL (ref 0.5–1.4)
EOSINOPHIL # BLD AUTO: 0.23 K/UL (ref 0–0.51)
EOSINOPHIL NFR BLD: 2.7 % (ref 0–6.9)
ERYTHROCYTE [DISTWIDTH] IN BLOOD BY AUTOMATED COUNT: 39.7 FL (ref 35.9–50)
FASTING STATUS PATIENT QL REPORTED: NORMAL
GFR SERPLBLD CREATININE-BSD FMLA CKD-EPI: 116 ML/MIN/1.73 M 2
GLOBULIN SER CALC-MCNC: 3.4 G/DL (ref 1.9–3.5)
GLUCOSE SERPL-MCNC: 85 MG/DL (ref 65–99)
HCT VFR BLD AUTO: 38.4 % (ref 37–47)
HGB BLD-MCNC: 13 G/DL (ref 12–16)
IMM GRANULOCYTES # BLD AUTO: 0.03 K/UL (ref 0–0.11)
IMM GRANULOCYTES NFR BLD AUTO: 0.4 % (ref 0–0.9)
LYMPHOCYTES # BLD AUTO: 3.14 K/UL (ref 1–4.8)
LYMPHOCYTES NFR BLD: 36.9 % (ref 22–41)
MCH RBC QN AUTO: 29.1 PG (ref 27–33)
MCHC RBC AUTO-ENTMCNC: 33.9 G/DL (ref 33.6–35)
MCV RBC AUTO: 86.1 FL (ref 81.4–97.8)
MONOCYTES # BLD AUTO: 0.5 K/UL (ref 0–0.85)
MONOCYTES NFR BLD AUTO: 5.9 % (ref 0–13.4)
NEUTROPHILS # BLD AUTO: 4.56 K/UL (ref 2–7.15)
NEUTROPHILS NFR BLD: 53.6 % (ref 44–72)
NRBC # BLD AUTO: 0 K/UL
NRBC BLD-RTO: 0 /100 WBC
PLATELET # BLD AUTO: 335 K/UL (ref 164–446)
PMV BLD AUTO: 10.4 FL (ref 9–12.9)
POTASSIUM SERPL-SCNC: 4 MMOL/L (ref 3.6–5.5)
PROT SERPL-MCNC: 7.4 G/DL (ref 6–8.2)
RBC # BLD AUTO: 4.46 M/UL (ref 4.2–5.4)
SODIUM SERPL-SCNC: 138 MMOL/L (ref 135–145)
TSH SERPL DL<=0.005 MIU/L-ACNC: 2.89 UIU/ML (ref 0.38–5.33)
WBC # BLD AUTO: 8.5 K/UL (ref 4.8–10.8)

## 2022-10-12 PROCEDURE — 36415 COLL VENOUS BLD VENIPUNCTURE: CPT

## 2022-10-12 PROCEDURE — 99214 OFFICE O/P EST MOD 30 MIN: CPT | Mod: 95,GC | Performed by: PSYCHIATRY & NEUROLOGY

## 2022-10-12 PROCEDURE — 84443 ASSAY THYROID STIM HORMONE: CPT

## 2022-10-12 PROCEDURE — 85025 COMPLETE CBC W/AUTO DIFF WBC: CPT

## 2022-10-12 PROCEDURE — 93000 ELECTROCARDIOGRAM COMPLETE: CPT | Performed by: FAMILY MEDICINE

## 2022-10-12 PROCEDURE — 99214 OFFICE O/P EST MOD 30 MIN: CPT | Mod: 25 | Performed by: FAMILY MEDICINE

## 2022-10-12 PROCEDURE — 80053 COMPREHEN METABOLIC PANEL: CPT

## 2022-10-12 RX ORDER — PRAZOSIN HYDROCHLORIDE 1 MG/1
CAPSULE ORAL
Qty: 90 CAPSULE | Refills: 1 | Status: SHIPPED | OUTPATIENT
Start: 2022-10-12 | End: 2022-12-06 | Stop reason: SDUPTHER

## 2022-10-12 RX ORDER — FLUOXETINE 20 MG/1
30 TABLET, FILM COATED ORAL DAILY
Qty: 90 TABLET | Refills: 1 | Status: SHIPPED | OUTPATIENT
Start: 2022-10-12 | End: 2022-12-06 | Stop reason: SDUPTHER

## 2022-10-12 RX ORDER — BACLOFEN 5 MG/1
TABLET ORAL
COMMUNITY
Start: 2022-09-26 | End: 2022-10-12

## 2022-10-12 ASSESSMENT — FIBROSIS 4 INDEX: FIB4 SCORE: 0.39
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
## 2022-10-12 NOTE — PROGRESS NOTES
"Subjective:     CC: Fainting    HPI:   Jaqui presents today with episode of syncope yesterday at work.    Was urinating and felt heart was pounding. Got back to desk, turned white, sweaty/clammy then passed out. Otherwise felt well prior.  Felt fine shortly thereafter.  Some increased headaches recently but nothing new with her chronic conditions.  No new medications.  Eating and drinking well.  No chest pain or shortness of breath.  Heart was pounding at the time but no additional instances of palpitations.    Happened 2x in past ~ 2018.    Medications, past medical history, allergies, and social history have been reviewed and updated.      Objective:       Exam:  /70 (BP Location: Left arm, Patient Position: Sitting, BP Cuff Size: Large adult)   Pulse 78   Temp 37.1 °C (98.8 °F)   Resp 14   Ht 1.676 m (5' 6\")   Wt 117 kg (258 lb 9.6 oz)   SpO2 97%   BMI 41.74 kg/m²  Body mass index is 41.74 kg/m².    Constitutional: Alert. Well appearing. No distress.  Skin: Warm, dry, good turgor, no visible rashes.  Eye: Equal, round and reactive to light, conjunctiva clear, lids normal.  ENMT: Moist mucous membranes. Normal dentition.  Respiratory: Normal effort. Lungs are clear to auscultation bilaterally.  Cardiovascular: Regular rate and rhythm. Normal S1/S2. No murmurs, rubs or gallops.   Neuro: Moves all four extremities. No facial droop.  Psych: Answers questions appropriately. Normal affect and mood.    EKG interpretation by me: NSR. HR is 70 . Axis is normal. No ST or QRS morphologic changes. No T-wave inversions.  Borderline short DE interval.  Possible LVH.  Quality of EKG is good.  When compared to EKG 8/20/2019 sinus bradycardia is no longer present.    Assessment & Plan:     32 y.o. female with the following -     1. Vasovagal syncope  Presents with episode of syncope yesterday insistent with likely vasovagal syncope.  No additional neurologic or cardiovascular symptoms.  EKG is normal besides possible " LVH and appears unchanged from prior.  We will check labs as below.  Discussed last time measures to help with recurrence.  No further work-up if labs normal, discussed that if this recurs we could consider echo given possible LVH on EKG.  - EKG - Clinic Performed  - CBC WITH DIFFERENTIAL; Future  - Comp Metabolic Panel; Future  - TSH WITH REFLEX TO FT4; Future      Please note that this note was created using voice recognition software.

## 2022-10-13 ENCOUNTER — TELEMEDICINE (OUTPATIENT)
Dept: BEHAVIORAL HEALTH | Facility: CLINIC | Age: 32
End: 2022-10-13
Payer: COMMERCIAL

## 2022-10-13 DIAGNOSIS — F33.1 MODERATE EPISODE OF RECURRENT MAJOR DEPRESSIVE DISORDER (HCC): ICD-10-CM

## 2022-10-13 PROCEDURE — 90837 PSYTX W PT 60 MINUTES: CPT | Mod: 95 | Performed by: MARRIAGE & FAMILY THERAPIST

## 2022-10-13 NOTE — PROGRESS NOTES
[facility]   Therapy Progress Note      This visit was conducted via Zoom using secure and encrypted videoconferencing technology.  The [facility]  The patient's identity was confirmed and verbal consent was obtained for this virtual visit.  Place of Service: POS 02 - Virtual visits from a location other than the patient's Home    Name: Jaqui Kumar  MRN: [id number]  : 1990  Age: 32 y.o.  Date of assessment: 10/13/2022  PCP: Joon Zamudio M.D.  Persons in attendance: Patient  Total session time: 55  minutes    Objective Observations:   Participation:Active verbal participation, Attentive, and Engaged   Grooming:Casual   Cognition:Alert and Fully Oriented   Eye Contact:Good   Mood:Euthymic   Affect:Flexible and Full range   Thought Process:Logical and Goal-directed   Speech:Rate within normal limits and Volume within normal limits    Current Risk:   Suicide: low   Homicide: low   Self-Harm: low   Relapse: low   Safety Plan Reviewed: not applicable    Topics addressed in psychotherapy include: Patient reported that she recently passed out at work. Discussed  safety concerns and precautions. Starting Prazosin this weekend. Discussed her relationships at work and what she feels that she can and can not discuss. Worked on discussing emotions and setting boundaries. Discussed benefits of being self, protecting self.     Care Plan Updated: No    Does patient express agreement with the above plan? Yes     Diagnosis:  1. Moderate episode of recurrent major depressive disorder (HCC)        Referral appointment(s) scheduled? No       MILKA Landeros

## 2022-10-24 ENCOUNTER — HOSPITAL ENCOUNTER (OUTPATIENT)
Facility: MEDICAL CENTER | Age: 32
End: 2022-10-24
Attending: PHYSICIAN ASSISTANT
Payer: COMMERCIAL

## 2022-10-24 PROCEDURE — 88305 TISSUE EXAM BY PATHOLOGIST: CPT | Mod: 59

## 2022-10-25 LAB — PATHOLOGY CONSULT NOTE: NORMAL

## 2022-10-31 ENCOUNTER — TELEMEDICINE (OUTPATIENT)
Dept: BEHAVIORAL HEALTH | Facility: CLINIC | Age: 32
End: 2022-10-31
Payer: COMMERCIAL

## 2022-10-31 DIAGNOSIS — F33.1 MODERATE EPISODE OF RECURRENT MAJOR DEPRESSIVE DISORDER (HCC): ICD-10-CM

## 2022-10-31 PROCEDURE — 90837 PSYTX W PT 60 MINUTES: CPT | Mod: 95 | Performed by: MARRIAGE & FAMILY THERAPIST
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
## 2022-11-21 ENCOUNTER — TELEMEDICINE (OUTPATIENT)
Dept: BEHAVIORAL HEALTH | Facility: CLINIC | Age: 32
End: 2022-11-21
Payer: COMMERCIAL

## 2022-11-21 DIAGNOSIS — F33.1 MODERATE EPISODE OF RECURRENT MAJOR DEPRESSIVE DISORDER (HCC): ICD-10-CM

## 2022-11-21 PROCEDURE — 90837 PSYTX W PT 60 MINUTES: CPT | Mod: 95 | Performed by: MARRIAGE & FAMILY THERAPIST
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
## 2022-11-22 ENCOUNTER — DOCUMENTATION (OUTPATIENT)
Dept: BEHAVIORAL HEALTH | Facility: CLINIC | Age: 32
End: 2022-11-22
Payer: COMMERCIAL

## 2022-12-06 ENCOUNTER — TELEMEDICINE (OUTPATIENT)
Dept: BEHAVIORAL HEALTH | Facility: CLINIC | Age: 32
End: 2022-12-06
Payer: COMMERCIAL

## 2022-12-06 DIAGNOSIS — F43.12 CHRONIC POST-TRAUMATIC STRESS DISORDER (PTSD): ICD-10-CM

## 2022-12-06 DIAGNOSIS — F33.1 MODERATE EPISODE OF RECURRENT MAJOR DEPRESSIVE DISORDER (HCC): ICD-10-CM

## 2022-12-06 PROCEDURE — 99214 OFFICE O/P EST MOD 30 MIN: CPT | Mod: 95,GC | Performed by: PSYCHIATRY & NEUROLOGY

## 2022-12-06 RX ORDER — PRAZOSIN HYDROCHLORIDE 1 MG/1
3 CAPSULE ORAL NIGHTLY
Qty: 90 CAPSULE | Refills: 2 | Status: SHIPPED | OUTPATIENT
Start: 2022-12-06 | End: 2023-02-07 | Stop reason: SDUPTHER

## 2022-12-06 RX ORDER — FLUOXETINE 20 MG/1
30 TABLET, FILM COATED ORAL DAILY
Qty: 90 TABLET | Refills: 1 | Status: SHIPPED | OUTPATIENT
Start: 2022-12-06 | End: 2023-02-07 | Stop reason: SDUPTHER
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
## 2022-12-15 ENCOUNTER — TELEMEDICINE (OUTPATIENT)
Dept: MEDICAL GROUP | Facility: LAB | Age: 32
End: 2022-12-15
Payer: COMMERCIAL

## 2022-12-15 DIAGNOSIS — G90.511 COMPLEX REGIONAL PAIN SYNDROME TYPE 1 OF RIGHT UPPER EXTREMITY: ICD-10-CM

## 2022-12-15 DIAGNOSIS — M54.41 ACUTE BILATERAL LOW BACK PAIN WITH BILATERAL SCIATICA: ICD-10-CM

## 2022-12-15 DIAGNOSIS — R20.2 RIGHT LEG PARESTHESIAS: ICD-10-CM

## 2022-12-15 DIAGNOSIS — M54.42 ACUTE BILATERAL LOW BACK PAIN WITH BILATERAL SCIATICA: ICD-10-CM

## 2022-12-15 DIAGNOSIS — M79.7 FIBROMYALGIA: ICD-10-CM

## 2022-12-15 DIAGNOSIS — M54.16 BILATERAL LUMBAR RADICULOPATHY: ICD-10-CM

## 2022-12-15 PROCEDURE — 99214 OFFICE O/P EST MOD 30 MIN: CPT | Mod: 95 | Performed by: FAMILY MEDICINE

## 2022-12-15 RX ORDER — METHYLPREDNISOLONE 4 MG/1
TABLET ORAL
Qty: 21 TABLET | Refills: 0 | Status: SHIPPED | OUTPATIENT
Start: 2022-12-15 | End: 2023-09-29

## 2022-12-15 NOTE — PROGRESS NOTES
Virtual Visit: Established Patient   This visit was conducted via Zoom using secure and encrypted videoconferencing technology.   The patient was in a private location outside of their home in the state of [place].    The patient's identity was confirmed and verbal consent was obtained for this virtual visit.    Subjective:   CC: Pain management    Jaqui Kumar is a 32 y.o. female with a complex medical history including fibromyalgia, CRPS, paresthesias, optic nerve atrophy, depression, and chronic bilateral sciatica    She currently follows with pain management and psychiatry.  Main issue today is acute flare of chronic bilateral sciatica.  Her current pain management team is recommending spinal cord implant she is hesitant to consider this.  She has had minimal improvement with multiple epidurals, multiple different medications including gabapentin and Lyrica.  She is wondering if other less invasive treatment options are available and has concerns regarding her ability to continue riding horses after the procedure.  She would like referral for a second opinion.  She is also in extreme acute pain and wondering if there are any other treatment options acutely.    Reports mood has been well controlled.  Denies thoughts of self-harm.  Her current pain management and [facility] recommend neuropsychiatric testing for more evaluation of her chronic pain given her history of multiple concussions and head trauma.    ROS   See HPI    Current medicines (including changes today)  Current Outpatient Medications   Medication Sig Dispense Refill    fluoxetine (PROZAC) 20 MG tablet Take 1.5 Tablets by mouth every day. 90 Tablet 1    prazosin (MINIPRESS) 1 MG Cap Take 3 Capsules by mouth every evening for 90 days. 90 Capsule 2    triamcinolone acetonide (KENALOG) 0.1 % Cream Apply 1 Application topically 2 times a day. 28.4 g 1    ANNOVERA 0.013-0.15 MG/24HR RING       lidocaine (LIDODERM)  5 % Patch Apply 1 Patch to skin as directed every 24 hours. 10 Patch 0    acetaminophen (TYLENOL) 500 MG Tab Take 500-1,000 mg by mouth every 6 hours as needed for Moderate Pain.      omeprazole (PRILOSEC) 20 MG delayed-release capsule Take 20 mg by mouth every day.  0     No current facility-administered medications for this visit.       Patient Active Problem List    Diagnosis Date Noted    Fibromyalgia 07/30/2021    Lateral epicondylitis of both elbows 07/09/2021    Complex regional pain syndrome type 1 of right upper extremity 07/09/2021    Right leg paresthesias 05/06/2020    Current moderate episode of major depressive disorder (HCC) 05/06/2020    Extensor tendon disruption 02/28/2020    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    Physical Exam:  Constitutional: Alert, no distress, well-groomed.  Skin: No rashes in visible areas.  Eye: Round. Conjunctiva clear, lids normal. No icterus.   ENMT: Lips pink without lesions, good dentition, moist mucous membranes. Phonation normal.  Neck: No masses, no thyromegaly. Moves freely without pain.  Respiratory: Unlabored respiratory effort, no cough or audible wheeze  Psych: Alert and oriented x3, normal affect and mood.     Assessment and Plan:   The following treatment plan was discussed:     1. Acute bilateral low back pain with bilateral sciatica  Severe acute worsening of chronic bilateral lumbar radiculopathy.  Not responding to multiple other treatments or medications.  Will try Medrol Dosepak.  Further evaluation for chronic issues as below.  - methylPREDNISolone (MEDROL DOSEPAK) 4 MG Tablet Therapy Pack; As directed on the packaging label.  Dispense: 21 Tablet; Refill: 0  - Referral to Pain Management    2. Bilateral lumbar radiculopathy  3. Right leg paresthesias  Complex history.   Current pain management team is recommending spinal cord implant and she is hesitant to consider this.  She has had minimal improvement with multiple epidurals, multiple different medications including gabapentin and Lyrica.  She would like referral for second opinion and this is placed.  - Referral to Pain Management    4. Fibromyalgia  5. Complex regional pain syndrome type 1 of right upper extremity  She previously had visit with [facility].  Both [facility] and her current pain management are recommending neuropsychiatric evaluation due to her complex pain symptoms and history of head trauma.  Referral is placed.  - Referral to Psychology    My total time spent caring for the patient on the day of the encounter was >30 minutes.   This does not include time spent on separately billable procedures/tests.      Follow-up: No follow-ups on file.

## 2023-02-07 ENCOUNTER — TELEMEDICINE (OUTPATIENT)
Dept: BEHAVIORAL HEALTH | Facility: CLINIC | Age: 33
End: 2023-02-07
Payer: COMMERCIAL

## 2023-02-07 ENCOUNTER — PATIENT MESSAGE (OUTPATIENT)
Dept: MEDICAL GROUP | Facility: LAB | Age: 33
End: 2023-02-07
Payer: COMMERCIAL

## 2023-02-07 DIAGNOSIS — F33.1 MODERATE EPISODE OF RECURRENT MAJOR DEPRESSIVE DISORDER (HCC): ICD-10-CM

## 2023-02-07 DIAGNOSIS — F43.12 CHRONIC POST-TRAUMATIC STRESS DISORDER (PTSD): ICD-10-CM

## 2023-02-07 DIAGNOSIS — M54.16 BILATERAL LUMBAR RADICULOPATHY: ICD-10-CM

## 2023-02-07 PROCEDURE — 99214 OFFICE O/P EST MOD 30 MIN: CPT | Mod: 95,GC | Performed by: PSYCHIATRY & NEUROLOGY

## 2023-02-07 RX ORDER — METHOCARBAMOL 750 MG/1
750 TABLET, FILM COATED ORAL 2 TIMES DAILY
Qty: 180 TABLET | Refills: 0 | Status: SHIPPED | OUTPATIENT
Start: 2023-02-07 | End: 2023-05-08

## 2023-02-07 RX ORDER — FLUOXETINE 20 MG/1
30 TABLET, FILM COATED ORAL DAILY
Qty: 90 TABLET | Refills: 1 | Status: SHIPPED | OUTPATIENT
Start: 2023-02-07 | End: 2023-03-31 | Stop reason: SDUPTHER

## 2023-02-07 RX ORDER — PRAZOSIN HYDROCHLORIDE 1 MG/1
3 CAPSULE ORAL NIGHTLY
Qty: 90 CAPSULE | Refills: 2 | Status: SHIPPED | OUTPATIENT
Start: 2023-02-07 | End: 2023-03-31 | Stop reason: SDUPTHER
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
## 2023-02-14 ENCOUNTER — TELEMEDICINE (OUTPATIENT)
Dept: BEHAVIORAL HEALTH | Facility: CLINIC | Age: 33
End: 2023-02-14
Payer: COMMERCIAL

## 2023-02-14 DIAGNOSIS — F33.1 MODERATE EPISODE OF RECURRENT MAJOR DEPRESSIVE DISORDER (HCC): ICD-10-CM

## 2023-02-14 PROCEDURE — 90837 PSYTX W PT 60 MINUTES: CPT | Mod: 95 | Performed by: MARRIAGE & FAMILY THERAPIST

## 2023-02-14 ASSESSMENT — PATIENT HEALTH QUESTIONNAIRE - PHQ9
CLINICAL INTERPRETATION OF PHQ2 SCORE: 1
SUM OF ALL RESPONSES TO PHQ QUESTIONS 1-9: 6
5. POOR APPETITE OR OVEREATING: 0 - NOT AT ALL
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
## 2023-02-21 ENCOUNTER — TELEMEDICINE (OUTPATIENT)
Dept: BEHAVIORAL HEALTH | Facility: CLINIC | Age: 33
End: 2023-02-21
Payer: COMMERCIAL

## 2023-02-21 DIAGNOSIS — F33.1 MODERATE EPISODE OF RECURRENT MAJOR DEPRESSIVE DISORDER (HCC): ICD-10-CM

## 2023-02-21 PROCEDURE — 90837 PSYTX W PT 60 MINUTES: CPT | Mod: 95 | Performed by: MARRIAGE & FAMILY THERAPIST

## 2023-02-21 NOTE — PROGRESS NOTES
"[facility]   Therapy Progress Note      This visit was conducted via Zoom using secure and encrypted videoconferencing technology.  The patient was in a private location in the [facility].  The patient's identity was confirmed and verbal consent was obtained for this virtual visit.  Place of Service: POS 10 -The patient is at Home during their visit           Name: Jaqui Kumar  MRN: [id number]  : 1990  Age: 33 y.o.  Date of assessment: 2023  PCP: Joon Zamudio M.D.  Persons in attendance: Patient  Total session time: 55 minutes    Objective Observations:   Participation:Active verbal participation, Attentive, and Engaged   Grooming:Casual   Cognition:Alert and Fully Oriented   Eye Contact:Good   Mood:Euthymic   Affect:Flexible and Full range   Thought Process:Logical and Goal-directed   Speech:Rate within normal limits and Volume within normal limits    Current Risk:   Suicide: low   Homicide: low   Self-Harm: low   Relapse: low   Safety Plan Reviewed: not applicable    Topics addressed in psychotherapy include: Discussed her fathers relationship with his new girlfriend. Discussed how to get her father to understand her level of trauma. The patient discussed that she feels her coping skills \"suck\".   Discussed setting boundaries and allowing her boss to reinforce them.     Care Plan Updated: No    Does patient express agreement with the above plan? Yes     Diagnosis:  1. Moderate episode of recurrent major depressive disorder (HCC)        Referral appointment(s) scheduled? No       MILKA Landeros    "

## 2023-03-02 ENCOUNTER — PATIENT MESSAGE (OUTPATIENT)
Dept: MEDICAL GROUP | Facility: LAB | Age: 33
End: 2023-03-02

## 2023-03-02 DIAGNOSIS — K21.9 GASTROESOPHAGEAL REFLUX DISEASE, UNSPECIFIED WHETHER ESOPHAGITIS PRESENT: ICD-10-CM

## 2023-03-06 ENCOUNTER — TELEMEDICINE (OUTPATIENT)
Dept: BEHAVIORAL HEALTH | Facility: CLINIC | Age: 33
End: 2023-03-06
Payer: COMMERCIAL

## 2023-03-06 DIAGNOSIS — F33.1 MODERATE EPISODE OF RECURRENT MAJOR DEPRESSIVE DISORDER (HCC): ICD-10-CM

## 2023-03-06 DIAGNOSIS — F43.12 CHRONIC POST-TRAUMATIC STRESS DISORDER (PTSD): ICD-10-CM

## 2023-03-06 PROCEDURE — 90837 PSYTX W PT 60 MINUTES: CPT | Mod: 95 | Performed by: MARRIAGE & FAMILY THERAPIST
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
## 2023-03-30 ENCOUNTER — TELEMEDICINE (OUTPATIENT)
Dept: BEHAVIORAL HEALTH | Facility: CLINIC | Age: 33
End: 2023-03-30
Payer: COMMERCIAL

## 2023-03-30 DIAGNOSIS — F33.1 MODERATE EPISODE OF RECURRENT MAJOR DEPRESSIVE DISORDER (HCC): ICD-10-CM

## 2023-03-30 DIAGNOSIS — F43.12 CHRONIC POST-TRAUMATIC STRESS DISORDER (PTSD): ICD-10-CM

## 2023-03-30 PROCEDURE — 90837 PSYTX W PT 60 MINUTES: CPT | Mod: 95 | Performed by: MARRIAGE & FAMILY THERAPIST
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
## 2023-03-31 ENCOUNTER — TELEMEDICINE (OUTPATIENT)
Dept: BEHAVIORAL HEALTH | Facility: CLINIC | Age: 33
End: 2023-03-31
Payer: COMMERCIAL

## 2023-03-31 DIAGNOSIS — F33.1 MODERATE EPISODE OF RECURRENT MAJOR DEPRESSIVE DISORDER (HCC): ICD-10-CM

## 2023-03-31 DIAGNOSIS — F43.12 CHRONIC POST-TRAUMATIC STRESS DISORDER (PTSD): ICD-10-CM

## 2023-03-31 PROCEDURE — 99999 PR NO CHARGE: CPT | Mod: 95 | Performed by: PSYCHIATRY & NEUROLOGY

## 2023-03-31 RX ORDER — FLUOXETINE 20 MG/1
30 TABLET, FILM COATED ORAL DAILY
Qty: 90 TABLET | Refills: 1 | Status: SHIPPED | OUTPATIENT
Start: 2023-03-31 | End: 2023-08-08 | Stop reason: SDUPTHER

## 2023-03-31 RX ORDER — PRAZOSIN HYDROCHLORIDE 1 MG/1
3 CAPSULE ORAL NIGHTLY
Qty: 90 CAPSULE | Refills: 2 | Status: SHIPPED | OUTPATIENT
Start: 2023-03-31 | End: 2023-06-29
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
## 2023-04-12 ENCOUNTER — TELEMEDICINE (OUTPATIENT)
Dept: MEDICAL GROUP | Facility: LAB | Age: 33
End: 2023-04-12
Payer: COMMERCIAL

## 2023-04-12 VITALS — BODY MASS INDEX: 41.74 KG/M2 | HEIGHT: 66 IN

## 2023-04-12 DIAGNOSIS — R10.13 EPIGASTRIC PAIN: ICD-10-CM

## 2023-04-12 DIAGNOSIS — Z90.3 HISTORY OF SLEEVE GASTRECTOMY: ICD-10-CM

## 2023-04-12 DIAGNOSIS — K21.9 GASTROESOPHAGEAL REFLUX DISEASE, UNSPECIFIED WHETHER ESOPHAGITIS PRESENT: ICD-10-CM

## 2023-04-12 PROCEDURE — 99214 OFFICE O/P EST MOD 30 MIN: CPT | Mod: 95 | Performed by: FAMILY MEDICINE

## 2023-04-12 NOTE — PROGRESS NOTES
"Virtual Visit: Established Patient   This visit was conducted via Zoom using secure and encrypted videoconferencing technology.   The patient was in their home in the state of [place].    The patient's identity was confirmed and verbal consent was obtained for this virtual visit.     Subjective:   CC:   Chief Complaint   Patient presents with    Follow-Up     Trouble eating vegetables, drinking water       Jaqui Kumar is a 33 y.o. female with a complex medical history including sleeve gastrectomy in 2017 who presents with pain with eating and trouble swallowing.    Symptoms have been present for some time but she has not been pursuing treatment secondary to other health issues as well as mental health stressors.  Drinking water feels like \"swallowing rocks\" and causes stomach pain or bloating  Eating/food will often cause severe abdominal pain and nausea.  Chronic and severe reflux. No food getting stuck when swallowing.  Rare vomiting but she does have chronic diarrhea.  No melena or blood in stool.  Omeprazole does help and she is taking this 2-3 times daily.    Has been following with bariatric - Dr. Segura.  Reports no specific recommendations made. Had prior cholecystecomy    ROS   See HPI    Current medicines (including changes today)  Current Outpatient Medications   Medication Sig Dispense Refill    fluoxetine (PROZAC) 20 MG tablet Take 1.5 Tablets by mouth every day. 90 Tablet 1    prazosin (MINIPRESS) 1 MG Cap Take 3 Capsules by mouth every evening for 90 days. 90 Capsule 2    methocarbamol (ROBAXIN) 750 MG Tab Take 1 Tablet by mouth 2 times a day for 90 days. 180 Tablet 0    methylPREDNISolone (MEDROL DOSEPAK) 4 MG Tablet Therapy Pack As directed on the packaging label. 21 Tablet 0    triamcinolone acetonide (KENALOG) 0.1 % Cream Apply 1 Application topically 2 times a day. 28.4 g 1    lidocaine (LIDODERM) 5 % Patch Apply 1 Patch to skin as directed every 24 hours. 10 Patch 0    acetaminophen " "(TYLENOL) 500 MG Tab Take 500-1,000 mg by mouth every 6 hours as needed for Moderate Pain.      omeprazole (PRILOSEC) 20 MG delayed-release capsule Take 20 mg by mouth every day.  0     No current facility-administered medications for this visit.       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  Skin: No rashes in visible areas.  Eye: Round. Conjunctiva clear, lids normal. No icterus.   ENMT: Lips pink without lesions, good dentition, moist mucous membranes. Phonation normal.  Neck: No masses, no thyromegaly. Moves freely without pain.  Respiratory: Unlabored respiratory effort, no cough or audible wheeze  Psych: Alert and oriented x3, normal affect and mood.     Assessment and Plan:   The following treatment plan was discussed:     1. History of sleeve gastrectomy  2. Gastroesophageal reflux disease, unspecified whether esophagitis present  3. Epigastric pain    History of sleeve gastrectomy in 2017.  Currently with persistent severe symptoms including reflux, pain with eating, pain with swallowing, bloating, chronic diarrhea.  Differential includes stricture, peptic ulcer, GERD.  Continue Prilosec twice daily.  Referral placed to gastroenterology to discuss further work-up including possible EGD.    - Referral to Gastroenterology    Follow-up: No " follow-ups on file.

## 2023-04-27 ENCOUNTER — APPOINTMENT (OUTPATIENT)
Dept: BEHAVIORAL HEALTH | Facility: CLINIC | Age: 33
End: 2023-04-27
Payer: COMMERCIAL

## 2023-05-02 ENCOUNTER — TELEMEDICINE (OUTPATIENT)
Dept: BEHAVIORAL HEALTH | Facility: CLINIC | Age: 33
End: 2023-05-02
Payer: COMMERCIAL

## 2023-05-02 DIAGNOSIS — F33.1 MODERATE EPISODE OF RECURRENT MAJOR DEPRESSIVE DISORDER (HCC): ICD-10-CM

## 2023-05-02 DIAGNOSIS — F43.12 CHRONIC POST-TRAUMATIC STRESS DISORDER (PTSD): ICD-10-CM

## 2023-05-02 PROCEDURE — 90837 PSYTX W PT 60 MINUTES: CPT | Mod: 95 | Performed by: MARRIAGE & FAMILY THERAPIST

## 2023-05-02 NOTE — PROGRESS NOTES
[facility]   Therapy Progress Note      This visit was conducted via Zoom using secure and encrypted videoconferencing technology.  The patient was in a private location in the [facility].  The patient's identity was confirmed and verbal consent was obtained for this virtual visit.  Place of Service: POS 10 -The patient is at Home during their visit           Name: Jaqui Kumar  MRN: [id number]  : 1990  Age: 33 y.o.  Date of assessment: 2023  PCP: Joon Zamudio M.D.  Persons in attendance: Patient  Total session time: 55 minutes    Objective Observations:   Participation:Active verbal participation, Attentive, and Engaged   Grooming:Casual   Cognition:Alert and Fully Oriented   Eye Contact:Good   Mood:Anxious   Affect:Flexible and Full range   Thought Process:Logical and Goal-directed   Speech:Rate within normal limits and Volume within normal limits    Current Risk:   Suicide: low   Homicide: low   Self-Harm: low   Relapse: low   Safety Plan Reviewed: not applicable    Topics addressed in psychotherapy include: Patient reported that her father asked why she hasn't accepted his girlfriend on facebook. Discussed her relationship with her  mother. Discussed if dad's dating was unfaithful to her mother. Discussed her mothers death and family relations.   Patient discussed that therapy is working as she does not spend energy on things that dont matter. Discussed a recent horse show as something that matters.  Worked with helping the patient find out more about herself. Suggested she look up Dalia Metzger PhD personality test.    Care Plan Updated: No    Does patient express agreement with the above plan? Yes     Diagnosis:  1. Moderate episode of recurrent major depressive disorder (HCC)    2. Chronic post-traumatic stress disorder (PTSD)        Referral appointment(s) scheduled? No       MILKA Landeros

## 2023-05-15 ENCOUNTER — TELEMEDICINE (OUTPATIENT)
Dept: BEHAVIORAL HEALTH | Facility: CLINIC | Age: 33
End: 2023-05-15
Payer: COMMERCIAL

## 2023-05-15 DIAGNOSIS — F33.1 MODERATE EPISODE OF RECURRENT MAJOR DEPRESSIVE DISORDER (HCC): ICD-10-CM

## 2023-05-15 DIAGNOSIS — F43.12 CHRONIC POST-TRAUMATIC STRESS DISORDER (PTSD): ICD-10-CM

## 2023-05-15 PROCEDURE — 1125F AMNT PAIN NOTED PAIN PRSNT: CPT | Mod: 95 | Performed by: MARRIAGE & FAMILY THERAPIST

## 2023-05-15 PROCEDURE — 90837 PSYTX W PT 60 MINUTES: CPT | Mod: 95 | Performed by: MARRIAGE & FAMILY THERAPIST
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
## 2023-06-02 ENCOUNTER — APPOINTMENT (OUTPATIENT)
Dept: RADIOLOGY | Facility: MEDICAL CENTER | Age: 33
End: 2023-06-02
Attending: NURSE PRACTITIONER
Payer: COMMERCIAL

## 2023-06-02 DIAGNOSIS — M54.50 LOW BACK PAIN, UNSPECIFIED BACK PAIN LATERALITY, UNSPECIFIED CHRONICITY, UNSPECIFIED WHETHER SCIATICA PRESENT: ICD-10-CM

## 2023-06-02 PROCEDURE — 72148 MRI LUMBAR SPINE W/O DYE: CPT

## 2023-06-02 PROCEDURE — 72146 MRI CHEST SPINE W/O DYE: CPT

## 2023-06-06 ENCOUNTER — TELEMEDICINE (OUTPATIENT)
Dept: BEHAVIORAL HEALTH | Facility: CLINIC | Age: 33
End: 2023-06-06
Payer: COMMERCIAL

## 2023-06-06 DIAGNOSIS — F33.1 MODERATE EPISODE OF RECURRENT MAJOR DEPRESSIVE DISORDER (HCC): ICD-10-CM

## 2023-06-06 DIAGNOSIS — F43.12 CHRONIC POST-TRAUMATIC STRESS DISORDER (PTSD): ICD-10-CM

## 2023-06-06 PROCEDURE — 90837 PSYTX W PT 60 MINUTES: CPT | Mod: 95 | Performed by: MARRIAGE & FAMILY THERAPIST

## 2023-06-06 NOTE — PROGRESS NOTES
"[facility]   Therapy Progress Note      This visit was conducted via Zoom using secure and encrypted videoconferencing technology.  The patient was in a private location in the [facility].  The patient's identity was confirmed and verbal consent was obtained for this virtual visit.  Place of Service: POS 10 -The patient is at Home during their visit           Name: Jaqui Kumar  MRN: [id number]  : 1990  Age: 33 y.o.  Date of assessment: 2023  PCP: Joon Zamudio M.D.  Persons in attendance: Patient  Total session time: 55 minutes    Objective Observations:   Participation:Active verbal participation, Attentive, and Engaged   Grooming:Casual   Cognition:Alert and Fully Oriented   Eye Contact:Good   Mood:Anxious   Affect:Flexible and Full range   Thought Process:Logical and Goal-directed   Speech:Rate within normal limits and Volume within normal limits    Current Risk:   Suicide: low   Homicide: low   Self-Harm: low   Relapse: low   Safety Plan Reviewed: not applicable    Topics addressed in psychotherapy include: Patient reports that she had her MRI and there are extensive results. Discussed boundaries with another person with the [facility] team. Discussed other boundaries in life. The patient discussed the probability of needing spinal surgery. Discussed if \"Would you want to know when you're going to die?\". Worked with the patient in maintaining perspective.    Care Plan Updated: No    Does patient express agreement with the above plan? Yes     Diagnosis:  1. Moderate episode of recurrent major depressive disorder (HCC)    2. Chronic post-traumatic stress disorder (PTSD)        Referral appointment(s) scheduled? No       MILKA Landeros    "

## 2023-07-18 ENCOUNTER — APPOINTMENT (OUTPATIENT)
Dept: BEHAVIORAL HEALTH | Facility: CLINIC | Age: 33
End: 2023-07-18
Payer: COMMERCIAL

## 2023-08-08 ENCOUNTER — TELEMEDICINE (OUTPATIENT)
Dept: MEDICAL GROUP | Facility: LAB | Age: 33
End: 2023-08-08
Payer: COMMERCIAL

## 2023-08-08 ENCOUNTER — TELEMEDICINE (OUTPATIENT)
Dept: BEHAVIORAL HEALTH | Facility: CLINIC | Age: 33
End: 2023-08-08
Payer: COMMERCIAL

## 2023-08-08 VITALS — HEIGHT: 66 IN | WEIGHT: 270 LBS | BODY MASS INDEX: 43.39 KG/M2

## 2023-08-08 DIAGNOSIS — F41.1 GENERALIZED ANXIETY DISORDER: ICD-10-CM

## 2023-08-08 DIAGNOSIS — F43.12 CHRONIC POST-TRAUMATIC STRESS DISORDER (PTSD): ICD-10-CM

## 2023-08-08 DIAGNOSIS — Z98.84 HISTORY OF GASTRIC BYPASS: ICD-10-CM

## 2023-08-08 DIAGNOSIS — R42 LIGHTHEADEDNESS: ICD-10-CM

## 2023-08-08 DIAGNOSIS — E66.01 CLASS 3 SEVERE OBESITY DUE TO EXCESS CALORIES WITH SERIOUS COMORBIDITY AND BODY MASS INDEX (BMI) OF 40.0 TO 44.9 IN ADULT (HCC): ICD-10-CM

## 2023-08-08 DIAGNOSIS — F33.1 MAJOR DEPRESSIVE DISORDER, RECURRENT EPISODE, MODERATE (HCC): ICD-10-CM

## 2023-08-08 DIAGNOSIS — F33.1 MODERATE EPISODE OF RECURRENT MAJOR DEPRESSIVE DISORDER (HCC): ICD-10-CM

## 2023-08-08 PROCEDURE — 99214 OFFICE O/P EST MOD 30 MIN: CPT | Mod: 95 | Performed by: FAMILY MEDICINE

## 2023-08-08 PROCEDURE — 99214 OFFICE O/P EST MOD 30 MIN: CPT | Mod: 95 | Performed by: PSYCHIATRY & NEUROLOGY

## 2023-08-08 RX ORDER — PRAZOSIN HYDROCHLORIDE 1 MG/1
1 CAPSULE ORAL 3 TIMES DAILY
Qty: 90 CAPSULE | Refills: 0 | Status: SHIPPED | OUTPATIENT
Start: 2023-08-08 | End: 2023-09-07

## 2023-08-08 RX ORDER — OXYCODONE HYDROCHLORIDE AND ACETAMINOPHEN 5; 325 MG/1; MG/1
TABLET ORAL
COMMUNITY
End: 2023-08-08

## 2023-08-08 RX ORDER — FLUOXETINE 20 MG/1
30 TABLET, FILM COATED ORAL DAILY
Qty: 45 TABLET | Refills: 0 | Status: SHIPPED | OUTPATIENT
Start: 2023-08-08 | End: 2023-09-07

## 2023-08-08 RX ORDER — CITALOPRAM 20 MG/1
TABLET ORAL
COMMUNITY
End: 2023-08-08

## 2023-08-08 RX ORDER — DICLOFENAC EPOLAMINE 0.01 G/1
SYSTEM TOPICAL
COMMUNITY
End: 2023-10-04

## 2023-08-08 RX ORDER — METHOCARBAMOL 750 MG/1
750 TABLET, FILM COATED ORAL 3 TIMES DAILY PRN
COMMUNITY
Start: 2023-07-21

## 2023-08-08 RX ORDER — CITALOPRAM 40 MG/1
1 TABLET ORAL
COMMUNITY
End: 2023-08-08

## 2023-08-08 RX ORDER — PREGABALIN 100 MG/1
CAPSULE ORAL
COMMUNITY
End: 2023-08-08

## 2023-08-08 RX ORDER — LIDOCAINE 36 MG/1
PATCH TOPICAL
COMMUNITY
End: 2023-08-08

## 2023-08-08 RX ORDER — CYCLOBENZAPRINE HCL 10 MG
TABLET ORAL
COMMUNITY
End: 2023-08-08

## 2023-08-08 RX ORDER — ALPRAZOLAM 0.25 MG/1
0.25 TABLET ORAL DAILY
Qty: 30 TABLET | Refills: 0 | Status: SHIPPED | OUTPATIENT
Start: 2023-08-08 | End: 2023-09-07

## 2023-08-08 RX ORDER — CELECOXIB 100 MG/1
1 CAPSULE ORAL
COMMUNITY
End: 2023-08-08

## 2023-08-08 ASSESSMENT — FIBROSIS 4 INDEX: FIB4 SCORE: 0.41
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
## 2023-08-08 NOTE — PROGRESS NOTES
Virtual Visit: Established Patient   This visit was conducted via Zoom using secure and encrypted videoconferencing technology.   The patient was in their home in the [facility].    The patient's identity was confirmed and verbal consent was obtained for this virtual visit.    Subjective:   CC:   Chief Complaint   Patient presents with    Weight Check     Discuss different options      Jaqui Kumar is a 33 y.o. female with complex medical history including sleeve gastrectomy in 2017, depression, and CRPS presents with continued issues with weight gain as well as GI symptoms.    Last discussed 4 months ago where she was having continued issues with chronic stomach pain, severe reflux, feeling of food getting stuck.  She pursued further evaluation with bariatric surgery but limited options secondary to change in insurance and insurance limitations.  She has also had a prior cholecystectomy.  She does continue on omeprazole twice daily.  Denies melena or blood in stool.  Will often feel like she has to vomit but has hard time actually vomiting.  She also continues to gain weight despite the history of sleeve gastrectomy.    She also notes today that she has been having repeated episodes where she feels like she needs to faint and she will have to lay down for the symptoms to pass.  This has been happening intermittently for some time and her friends recently encouraged her to discuss with her physician as she thought it was relatively normal.    ROS   See HPI    Current medicines (including changes today)  Current Outpatient Medications   Medication Sig Dispense Refill    fluoxetine (PROZAC) 20 MG tablet Take 1.5 Tablets by mouth every day for 30 days. 45 Tablet 0    prazosin (MINIPRESS) 1 MG Cap Take 1 Capsule by mouth 3 times a day for 30 days. 90 Capsule 0    ALPRAZolam (XANAX) 0.25 MG Tab Take 1 Tablet by mouth every day for 30 days. 30 Tablet 0    diclofenac epolamine 1.3 % Patch       diclofenac  "sodium (VOLTAREN) 1 % Gel Apply 1 Application topically 2 times a day.      methocarbamol (ROBAXIN) 750 MG Tab Take 750 mg by mouth 3 times a day as needed.      triamcinolone acetonide (KENALOG) 0.1 % Cream Apply 1 Application topically 2 times a day. 28.4 g 1    lidocaine (LIDODERM) 5 % Patch Apply 1 Patch to skin as directed every 24 hours. 10 Patch 0    acetaminophen (TYLENOL) 500 MG Tab Take 500-1,000 mg by mouth every 6 hours as needed for Moderate Pain.      omeprazole (PRILOSEC) 20 MG delayed-release capsule Take 20 mg by mouth every day.  0    methylPREDNISolone (MEDROL DOSEPAK) 4 MG Tablet Therapy Pack As directed on the packaging label. 21 Tablet 0     No current facility-administered medications for this visit.       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  Skin: No rashes in visible areas.  Eye: Round. Conjunctiva clear, lids normal. No icterus.   ENMT: Lips pink without lesions, good dentition, moist mucous membranes. Phonation normal.  Neck: No masses, no thyromegaly. Moves freely without pain.  Respiratory: Unlabored respiratory effort, no cough or audible wheeze  Psych: Alert " and oriented x3, normal affect and mood.     Assessment and Plan:   The following treatment plan was discussed:     1. Lightheadedness  Recurrent episodic lightheadedness/presyncopal feeling.  Limited evaluation secondary to virtual visit.  Stop prazosin.  Check basic labs as below.  Schedule in person visit for further evaluation.  Discussed ER precautions.  - CBC WITH DIFFERENTIAL; Future  - Comp Metabolic Panel; Future  - TSH WITH REFLEX TO FT4; Future    2. Class 3 severe obesity due to excess calories with serious comorbidity and body mass index (BMI) of 40.0 to 44.9 in adult (HCC)  Continues to gain weight despite history of sleeve gastrectomy along with chronic GI symptoms as below.  Briefly discussed possible medication options today, she does think that Mounjaro would possibly be approved by insurance.  Given lightheadedness above or holding on starting new medication prior to further workup.  Can discuss Mounjaro along with other options at next follow-up.    3. History of gastric bypass  Continued severe reflux, pain with swallowing, dysphagia.  Had recommended follow-up with GI at last visit but she had trouble scheduling.  Will send new referral to gastroenterology and strongly encourage she schedule visit.  Continue omeprazole twice daily for now.  - Referral to Gastroenterology    Other orders  - diclofenac epolamine 1.3 % Patch  - diclofenac sodium (VOLTAREN) 1 % Gel; Apply 1 Application topically 2 times a day.  - methocarbamol (ROBAXIN) 750 MG Tab; Take 750 mg by mouth 3 times a day as needed.

## 2023-08-17 ENCOUNTER — OFFICE VISIT (OUTPATIENT)
Dept: MEDICAL GROUP | Facility: LAB | Age: 33
End: 2023-08-17
Payer: COMMERCIAL

## 2023-08-17 ENCOUNTER — HOSPITAL ENCOUNTER (OUTPATIENT)
Dept: LAB | Facility: MEDICAL CENTER | Age: 33
End: 2023-08-17
Attending: FAMILY MEDICINE
Payer: COMMERCIAL

## 2023-08-17 VITALS
OXYGEN SATURATION: 99 % | WEIGHT: 268 LBS | HEART RATE: 86 BPM | DIASTOLIC BLOOD PRESSURE: 100 MMHG | SYSTOLIC BLOOD PRESSURE: 130 MMHG | RESPIRATION RATE: 16 BRPM | TEMPERATURE: 98.2 F | BODY MASS INDEX: 43.07 KG/M2 | HEIGHT: 66 IN

## 2023-08-17 DIAGNOSIS — E66.01 CLASS 3 SEVERE OBESITY DUE TO EXCESS CALORIES WITH SERIOUS COMORBIDITY AND BODY MASS INDEX (BMI) OF 40.0 TO 44.9 IN ADULT (HCC): ICD-10-CM

## 2023-08-17 DIAGNOSIS — F51.5 NIGHTMARES: ICD-10-CM

## 2023-08-17 DIAGNOSIS — R42 LIGHTHEADEDNESS: ICD-10-CM

## 2023-08-17 LAB
ALBUMIN SERPL BCP-MCNC: 4.2 G/DL (ref 3.2–4.9)
ALBUMIN/GLOB SERPL: 1.3 G/DL
ALP SERPL-CCNC: 77 U/L (ref 30–99)
ALT SERPL-CCNC: 8 U/L (ref 2–50)
ANION GAP SERPL CALC-SCNC: 11 MMOL/L (ref 7–16)
AST SERPL-CCNC: 15 U/L (ref 12–45)
BASOPHILS # BLD AUTO: 0.3 % (ref 0–1.8)
BASOPHILS # BLD: 0.03 K/UL (ref 0–0.12)
BILIRUB SERPL-MCNC: 0.2 MG/DL (ref 0.1–1.5)
BUN SERPL-MCNC: 16 MG/DL (ref 8–22)
CALCIUM ALBUM COR SERPL-MCNC: 8.9 MG/DL (ref 8.5–10.5)
CALCIUM SERPL-MCNC: 9.1 MG/DL (ref 8.5–10.5)
CHLORIDE SERPL-SCNC: 101 MMOL/L (ref 96–112)
CO2 SERPL-SCNC: 22 MMOL/L (ref 20–33)
CREAT SERPL-MCNC: 0.86 MG/DL (ref 0.5–1.4)
EOSINOPHIL # BLD AUTO: 0.2 K/UL (ref 0–0.51)
EOSINOPHIL NFR BLD: 2.2 % (ref 0–6.9)
ERYTHROCYTE [DISTWIDTH] IN BLOOD BY AUTOMATED COUNT: 41.7 FL (ref 35.9–50)
GFR SERPLBLD CREATININE-BSD FMLA CKD-EPI: 90 ML/MIN/1.73 M 2
GLOBULIN SER CALC-MCNC: 3.2 G/DL (ref 1.9–3.5)
GLUCOSE SERPL-MCNC: 96 MG/DL (ref 65–99)
HCT VFR BLD AUTO: 38.9 % (ref 37–47)
HGB BLD-MCNC: 13 G/DL (ref 12–16)
IMM GRANULOCYTES # BLD AUTO: 0.03 K/UL (ref 0–0.11)
IMM GRANULOCYTES NFR BLD AUTO: 0.3 % (ref 0–0.9)
LYMPHOCYTES # BLD AUTO: 2.62 K/UL (ref 1–4.8)
LYMPHOCYTES NFR BLD: 29.5 % (ref 22–41)
MCH RBC QN AUTO: 28 PG (ref 27–33)
MCHC RBC AUTO-ENTMCNC: 33.4 G/DL (ref 32.2–35.5)
MCV RBC AUTO: 83.8 FL (ref 81.4–97.8)
MONOCYTES # BLD AUTO: 0.55 K/UL (ref 0–0.85)
MONOCYTES NFR BLD AUTO: 6.2 % (ref 0–13.4)
NEUTROPHILS # BLD AUTO: 5.46 K/UL (ref 1.82–7.42)
NEUTROPHILS NFR BLD: 61.5 % (ref 44–72)
NRBC # BLD AUTO: 0 K/UL
NRBC BLD-RTO: 0 /100 WBC (ref 0–0.2)
PLATELET # BLD AUTO: 320 K/UL (ref 164–446)
PMV BLD AUTO: 10.4 FL (ref 9–12.9)
POTASSIUM SERPL-SCNC: 4 MMOL/L (ref 3.6–5.5)
PROT SERPL-MCNC: 7.4 G/DL (ref 6–8.2)
RBC # BLD AUTO: 4.64 M/UL (ref 4.2–5.4)
SODIUM SERPL-SCNC: 134 MMOL/L (ref 135–145)
TSH SERPL DL<=0.005 MIU/L-ACNC: 2.97 UIU/ML (ref 0.38–5.33)
WBC # BLD AUTO: 8.9 K/UL (ref 4.8–10.8)

## 2023-08-17 PROCEDURE — 80053 COMPREHEN METABOLIC PANEL: CPT

## 2023-08-17 PROCEDURE — 3075F SYST BP GE 130 - 139MM HG: CPT | Performed by: FAMILY MEDICINE

## 2023-08-17 PROCEDURE — 99214 OFFICE O/P EST MOD 30 MIN: CPT | Performed by: FAMILY MEDICINE

## 2023-08-17 PROCEDURE — 84443 ASSAY THYROID STIM HORMONE: CPT

## 2023-08-17 PROCEDURE — 3080F DIAST BP >= 90 MM HG: CPT | Performed by: FAMILY MEDICINE

## 2023-08-17 PROCEDURE — 36415 COLL VENOUS BLD VENIPUNCTURE: CPT

## 2023-08-17 PROCEDURE — 85025 COMPLETE CBC W/AUTO DIFF WBC: CPT

## 2023-08-17 RX ORDER — TRAZODONE HYDROCHLORIDE 50 MG/1
50 TABLET ORAL NIGHTLY
Qty: 30 TABLET | Refills: 3 | Status: SHIPPED | OUTPATIENT
Start: 2023-08-17 | End: 2023-11-13

## 2023-08-17 ASSESSMENT — FIBROSIS 4 INDEX: FIB4 SCORE: 0.41

## 2023-08-17 NOTE — PROGRESS NOTES
"Subjective:     CC: Lightheaded, weight    HPI:   Jaqui is a 33-year-old female with a complex medical history including sleeve gastrectomy, depression, CRPS, and obesity who presents with multiple concerns including episodic lightheadedness/presyncope and weight.    Lightheadedness/presyncope has been recurrent issue going back for almost a year but episodes are starting to happen more often.  Symptoms often happen with standing and will improve with sitting or lying down.  Had a normal EKG done for this in October of last year.  She is on prazosin through psychiatry for nightmares related to PTSD and this was started toward the end of last year.  She will sometimes have some palpitations with this, no chest pain or shortness of breath.    She has had issues with continuing to gain weight despite history of sleeve gastrectomy.  She would like to try Mounjaro.    Medications, past medical history, allergies, and social history have been reviewed and updated.      Objective:       Exam:  /84 (BP Location: Right arm, Patient Position: Sitting, BP Cuff Size: Large adult long)   Pulse 70   Temp 36.8 °C (98.2 °F)   Resp 16   Ht 1.676 m (5' 6\")   Wt 122 kg (268 lb)   SpO2 99%   BMI 43.26 kg/m²  Body mass index is 43.26 kg/m².    Constitutional: Alert. Well appearing. No distress.  Skin: Warm, dry, good turgor, no visible rashes.  ENMT: Moist mucous membranes. Normal dentition.  Respiratory: Normal effort. Lungs are clear to auscultation bilaterally.  Cardiovascular: Regular rate and rhythm. Normal S1/S2. No murmurs, rubs or gallops.   Neuro: Moves all four extremities. No facial droop.  Psych: Answers questions appropriately. Normal affect and mood.    Orthostatic vitals: Supine 128/86, HR 70. Sitting 116/80 HR 78, Standing 130/100, HR 86    Assessment & Plan:     33 y.o. female with the following -     1. Lightheadedness  Chronic episodic lightheadedness/presyncope when standing.  Symptoms reproduced when " checking orthostatic vitals today and she does have a significant increase in heart rate from supine to standing.  Previous EKG done for this was normal.  Labs ordered at last visit and she will have collected today.  Suspect that her prazosin is likely contributing, she did have severe nightmares related to PTSD when not on this.  Will trial short course off of prazosin and monitor for symptom improvement.  We also discussed frequent hydration along with salt, small frequent meals.    2. Class 3 severe obesity due to excess calories with serious comorbidity and body mass index (BMI) of 40.0 to 44.9 in adult (HCC)  Worsening obesity despite history of gastric sleeve.  This contributes to multiple of her chronic medical conditions including chronic pain.  She has not had success with diet and exercise changes.  We will try Mounjaro with close follow-up scheduled.  - Tirzepatide 2.5 MG/0.5ML Solution Pen-injector; Inject 0.5 mL under the skin every 7 days.  Dispense: 2 mL; Refill: 3    3. Nightmares  Secondary to PTSD.  Have been well controlled with prazosin but stopping this due to significant orthostasis as above.  We will trial trazodone.  Could consider restarting lower dose prazosin if needed or increasing trazodone dose.  - traZODone (DESYREL) 50 MG Tab; Take 1 Tablet by mouth every evening.  Dispense: 30 Tablet; Refill: 3        Please note that this note was created using voice recognition software.

## 2023-08-18 ENCOUNTER — TELEPHONE (OUTPATIENT)
Dept: MEDICAL GROUP | Facility: LAB | Age: 33
End: 2023-08-18

## 2023-08-18 NOTE — TELEPHONE ENCOUNTER
MEDICATION PRIOR AUTHORIZATION NEEDED:    1. Name of Medication: Tirzepatide 2.5 MG/0.5ML Solution Pen-injector    2. Requested By (Name of Pharmacy): [facility]  P: [phone]  F: [phone]     3. Is insurance on file current? Yes    4. What is the name & phone number of the 3rd party payor?   [employer]    Current plan not active. [employer] message sent to patient for clarification    [employer] Key: [id number]

## 2023-08-18 NOTE — TELEPHONE ENCOUNTER
Prior authorization submitted verbally through [employer]. Waiting for determination.     Case ID: [id number]

## 2023-08-22 DIAGNOSIS — E66.01 CLASS 3 SEVERE OBESITY DUE TO EXCESS CALORIES WITH SERIOUS COMORBIDITY AND BODY MASS INDEX (BMI) OF 40.0 TO 44.9 IN ADULT (HCC): ICD-10-CM

## 2023-08-22 RX ORDER — METFORMIN HYDROCHLORIDE 500 MG/1
500 TABLET, EXTENDED RELEASE ORAL 2 TIMES DAILY
Qty: 180 TABLET | Refills: 3 | Status: SHIPPED | OUTPATIENT
Start: 2023-08-22

## 2023-09-29 PROBLEM — M17.11 TRICOMPARTMENT OSTEOARTHRITIS OF RIGHT KNEE: Status: ACTIVE | Noted: 2023-09-29

## 2023-09-29 PROBLEM — M25.561 RIGHT KNEE PAIN: Status: ACTIVE | Noted: 2023-09-29

## 2023-09-29 NOTE — TELEPHONE ENCOUNTER
FINAL PRIOR AUTHORIZATION STATUS:    1.  Name of Medication & Dose: Tirzepatide 2.5 MG/0.5ML Solution Pen-injector     2. Prior Auth Status: Denied.  Reason: Not FDA approved for weight loss.     3. Action Taken: Pharmacy Notified: yes Patient Notified: no

## 2023-10-11 ENCOUNTER — HOSPITAL ENCOUNTER (OUTPATIENT)
Dept: LAB | Facility: MEDICAL CENTER | Age: 33
End: 2023-10-11
Attending: PHYSICIAN ASSISTANT
Payer: COMMERCIAL

## 2023-10-11 PROCEDURE — 87624 HPV HI-RISK TYP POOLED RSLT: CPT

## 2023-10-11 PROCEDURE — 88175 CYTOPATH C/V AUTO FLUID REDO: CPT

## 2023-10-11 PROCEDURE — 87625 HPV TYPES 16 & 18 ONLY: CPT

## 2023-10-12 ENCOUNTER — TELEPHONE (OUTPATIENT)
Dept: MEDICAL GROUP | Facility: LAB | Age: 33
End: 2023-10-12
Payer: COMMERCIAL

## 2023-10-12 NOTE — TELEPHONE ENCOUNTER
MEDICATION PRIOR AUTHORIZATION NEEDED:    1. Name of Medication: Tirzepatide 2.5 MG/0.5ML Solution Pen-injector     2. Requested By (Name of Pharmacy): Patient     3. Is insurance on file current? Yes    4. What is the name & phone number of the 3rd party payor?   Anais  P: [phone]  F: [phone]    Prior auth submitted to plan through CoverMyMeds, waiting for possible approval.   Key: [id number]  Case ID: [id number]

## 2023-10-14 LAB
CYTOLOGIST CVX/VAG CYTO: ABNORMAL
CYTOLOGY CVX/VAG DOC CYTO: ABNORMAL
CYTOLOGY CVX/VAG DOC THIN PREP: ABNORMAL
DX ICD CODE: ABNORMAL
HPV I/H RISK 4 DNA CVX QL PROBE+SIG AMP: POSITIVE
HPV16 DNA CVX QL PROBE+SIG AMP: NEGATIVE
HPV18+45 E6+E7 MRNA CVX QL NAA+PROBE: NEGATIVE
NOTE NL11727A: ABNORMAL
OTHER STN SPEC: ABNORMAL
PATHOLOGIST CVX/VAG CYTO: ABNORMAL
STAT OF ADQ CVX/VAG CYTO-IMP: ABNORMAL

## 2023-10-19 ENCOUNTER — APPOINTMENT (OUTPATIENT)
Dept: MEDICAL GROUP | Facility: LAB | Age: 33
End: 2023-10-19
Payer: COMMERCIAL

## 2023-11-08 NOTE — TELEPHONE ENCOUNTER
FINAL PRIOR AUTHORIZATION STATUS:    1.  Name of Medication & Dose:  Tirzepatide 2.5 MG/0.5ML Solution Pen-injector     2. Prior Auth Status: Denied.  Reason: Does not meet criteria for coverage.    3. Action Taken: Pharmacy Notified: no Patient Notified: yes

## 2023-11-11 ENCOUNTER — HOSPITAL ENCOUNTER (OUTPATIENT)
Dept: RADIOLOGY | Facility: MEDICAL CENTER | Age: 33
End: 2023-11-11
Attending: PHYSICIAN ASSISTANT
Payer: COMMERCIAL

## 2023-11-11 DIAGNOSIS — R10.11 RUQ PAIN: ICD-10-CM

## 2023-11-11 PROCEDURE — 76700 US EXAM ABDOM COMPLETE: CPT

## 2023-11-13 ENCOUNTER — TELEMEDICINE (OUTPATIENT)
Dept: BEHAVIORAL HEALTH | Facility: CLINIC | Age: 33
End: 2023-11-13
Payer: COMMERCIAL

## 2023-11-13 DIAGNOSIS — F33.1 MAJOR DEPRESSIVE DISORDER, RECURRENT EPISODE, MODERATE (HCC): ICD-10-CM

## 2023-11-13 DIAGNOSIS — F41.1 GENERALIZED ANXIETY DISORDER: ICD-10-CM

## 2023-11-13 DIAGNOSIS — F51.4 NIGHT TERROR DISORDER: ICD-10-CM

## 2023-11-13 DIAGNOSIS — F43.12 CHRONIC POST-TRAUMATIC STRESS DISORDER (PTSD): ICD-10-CM

## 2023-11-13 PROCEDURE — 99214 OFFICE O/P EST MOD 30 MIN: CPT | Mod: 95 | Performed by: PSYCHIATRY & NEUROLOGY

## 2023-11-13 RX ORDER — PRAZOSIN HYDROCHLORIDE 2 MG/1
2 CAPSULE ORAL NIGHTLY
Qty: 90 CAPSULE | Refills: 0 | Status: SHIPPED | OUTPATIENT
Start: 2023-11-13 | End: 2024-02-11

## 2023-11-13 RX ORDER — FLUOXETINE 20 MG/1
30 TABLET, FILM COATED ORAL DAILY
Qty: 135 TABLET | Refills: 0 | Status: SHIPPED | OUTPATIENT
Start: 2023-11-13 | End: 2024-02-11

## 2023-11-13 NOTE — PROGRESS NOTES
[facility]   Follow Up AssessmentThis visit was conducted via Zoom using secure and encrypted videoconferencing technology.  The patient was in a private location in the [facility].  The patient's identity was confirmed and verbal consent was obtained for this virtual visit.  This evaluation was conducted via Zoom using secure and encrypted videoconferencing technology. The patient was in their home in the [facility].    The patient's identity was confirmed and verbal consent was obtained for this virtual visit.    This provider informed the patient their medical records are totally confidential except for the use by other providers involved in their care, or if the patient signs a release, or to report instances of child or elder abuse, or if it is determined they are an immediate risk to harm themselves or others.    Name: Jaqui Kumar  MRN: [id number]  : 1990  Age: 33 y.o.  Date of assessment: 2023  PCP: Joon Zamudio M.D.    Subjective:  Chart reviewed prior to the virtual visit in her home.  She was seen most recently on .  We reviewed her current medication combination, purposes, doses, etc.  Prazosin was discontinued by her PCP but she does not like trazodone and prefers to resume prazosin.  She has used only 5 or 6 Xanax as since last seen.    Objective:  She is alert, oriented, and cooperative.  Relatedness is good.  Grooming is good.  Speech is normal rate.  Less anxious.  Memory is good.  Insight and judgment are good.  No indication of psychotic thinking.    Current Risk:       Suicidal: Not suicidal       Homicidal: Not homicidal       Self-Harm: No plan to harm self       Relapse(Low/Moderate/High):: Moderate       Crisis Safety Plan Reviewed: Discussed with patient    Diagnosis:   Major depressive disorder, recurrent  Chronic PTSD  Generalized anxiety disorder with panic attacks.  Nightmares    Treatment Plan:  The current treatment plan  consists of quarterly psychiatric sessions designed to evaluate her depression, PTSD, and anxiety.    Duration will be for a minimum of 12 months and will be reviewed at each visit.    Goals: Remission of depression with control of anxiety and panic attacks in order to prevent relapse due to the chronic nature of her behavioral health problems and mental illness.  Discontinue trazodone and restart  2 mg at bedtime.  Continue a total of 30 mg of Prozac every morning.  She has supplies of Xanax 0.25 mg.        Vinny Fishman M.D.    This note was created using voice recognition software (Dragon). The accuracy of the dictation is limited by the abilities of the software. I have reviewed the note prior to signing, however some errors in grammar and context are still possible. If you have any questions related to this note please do not hesitate to contact our office.

## 2023-11-15 ENCOUNTER — APPOINTMENT (OUTPATIENT)
Dept: RADIOLOGY | Facility: MEDICAL CENTER | Age: 33
End: 2023-11-15
Attending: PHYSICIAN ASSISTANT
Payer: COMMERCIAL

## 2023-11-15 ENCOUNTER — OFFICE VISIT (OUTPATIENT)
Dept: MEDICAL GROUP | Facility: LAB | Age: 33
End: 2023-11-15
Payer: COMMERCIAL

## 2023-11-15 VITALS
TEMPERATURE: 97.9 F | SYSTOLIC BLOOD PRESSURE: 124 MMHG | BODY MASS INDEX: 42.91 KG/M2 | HEART RATE: 76 BPM | DIASTOLIC BLOOD PRESSURE: 82 MMHG | WEIGHT: 267 LBS | OXYGEN SATURATION: 97 % | RESPIRATION RATE: 16 BRPM | HEIGHT: 66 IN

## 2023-11-15 DIAGNOSIS — F51.4 NIGHT TERROR DISORDER: ICD-10-CM

## 2023-11-15 DIAGNOSIS — I95.1 ORTHOSTASIS: ICD-10-CM

## 2023-11-15 DIAGNOSIS — E66.01 CLASS 3 SEVERE OBESITY DUE TO EXCESS CALORIES WITH SERIOUS COMORBIDITY AND BODY MASS INDEX (BMI) OF 40.0 TO 44.9 IN ADULT (HCC): ICD-10-CM

## 2023-11-15 DIAGNOSIS — R42 LIGHTHEADEDNESS: ICD-10-CM

## 2023-11-15 PROCEDURE — 3074F SYST BP LT 130 MM HG: CPT | Performed by: FAMILY MEDICINE

## 2023-11-15 PROCEDURE — 3079F DIAST BP 80-89 MM HG: CPT | Performed by: FAMILY MEDICINE

## 2023-11-15 PROCEDURE — 99214 OFFICE O/P EST MOD 30 MIN: CPT | Performed by: FAMILY MEDICINE

## 2023-11-15 RX ORDER — METHYLPREDNISOLONE 4 MG/1
TABLET ORAL
COMMUNITY
Start: 2023-10-23 | End: 2023-11-15

## 2023-11-15 RX ORDER — FLUDROCORTISONE ACETATE 0.1 MG/1
0.1 TABLET ORAL DAILY
Qty: 30 TABLET | Status: CANCELLED | OUTPATIENT
Start: 2023-11-15

## 2023-11-15 ASSESSMENT — FIBROSIS 4 INDEX: FIB4 SCORE: 0.58

## 2023-11-16 NOTE — PROGRESS NOTES
"Subjective:     CC: Orthostasis, nightmares, obesity    HPI:   Jaqui presents today to follow-up on multiple issues.  Continued episodic lightheadedness/orthostasis that occurs when rising from a seated position.  Did not see improvement with stopping prazosin and has restarted this as nightmares were severe.  Continues to follow with psychiatry for this.  She does note that this has been responsive to adding salt packets to water once per day and in general episodes will be much less severe and not occur at all if she does this.  Has upcoming right knee replacement and hoping the increased activity will help with some weight loss.  GLP-1 agonists   have not been covered by her insurance.  Medications, past medical history, allergies, and social history have been reviewed and updated.      Objective:       Exam:  /82   Pulse 76   Temp 36.6 °C (97.9 °F) (Temporal)   Resp 16   Ht 1.676 m (5' 6\")   Wt 121 kg (267 lb)   SpO2 97%   BMI 43.09 kg/m²  Body mass index is 43.09 kg/m².    Constitutional: Alert. Well appearing. No distress.  Skin: Warm, dry, good turgor, no visible rashes.  ENMT: Moist mucous membranes.   Respiratory: Normal effort.   Neuro: Moves all four extremities. No facial droop.  Psych: Answers questions appropriately. Normal affect and mood.      Assessment & Plan:     33 y.o. female with the following -     1. Orthostasis  2. Lightheadedness  Persistent episodic lightheadedness upon rising from seated position.  Previous positive orthostatic vitals and other work-up including EKG reassuring.  Did not see improvement with prazosin so she is back on this.  She has seen improved symptoms with adding salt packets to water throughout the day and encouraged her to start doing this twice daily.  She does not see continued improvement we could consider Florinef.    3. Night terror disorder  Managed by psychiatry, she has restarted prazosin    4. Class 3 severe obesity due to excess calories with " serious comorbidity and body mass index (BMI) of 40.0 to 44.9 in adult (HCC)  History of gastric sleeve, insurance did not cover GLP-1 agonist.  Does have upcoming right knee replacement and she is hopeful this will help with increased activity afterwards, we also discussed dietary changes.         Please note that this note was created using voice recognition software.

## 2023-12-26 ENCOUNTER — HOSPITAL ENCOUNTER (OUTPATIENT)
Facility: MEDICAL CENTER | Age: 33
End: 2023-12-26
Attending: OBSTETRICS & GYNECOLOGY
Payer: COMMERCIAL

## 2023-12-26 LAB
AMBIGUOUS DTTM AMBI4: NORMAL
PATHOLOGY CONSULT NOTE: NORMAL

## 2023-12-26 PROCEDURE — 88307 TISSUE EXAM BY PATHOLOGIST: CPT | Mod: 59

## 2024-02-04 ENCOUNTER — PATIENT MESSAGE (OUTPATIENT)
Dept: MEDICAL GROUP | Facility: LAB | Age: 34
End: 2024-02-04
Payer: COMMERCIAL

## 2024-02-04 DIAGNOSIS — E66.01 CLASS 3 SEVERE OBESITY DUE TO EXCESS CALORIES WITH SERIOUS COMORBIDITY AND BODY MASS INDEX (BMI) OF 40.0 TO 44.9 IN ADULT (HCC): ICD-10-CM

## 2024-02-07 ENCOUNTER — TELEPHONE (OUTPATIENT)
Dept: MEDICAL GROUP | Facility: LAB | Age: 34
End: 2024-02-07
Payer: COMMERCIAL

## 2024-02-07 NOTE — TELEPHONE ENCOUNTER
MEDICATION PRIOR AUTHORIZATION NEEDED:    1. Name of Medication: Zepbound    2. Requested By (Name of Pharmacy): [facility]     3. Is insurance on file current? yes    4. What is the name & phone number of the 3rd party payor? [employer]    Cover My Meds Key #  [id number]

## 2024-03-06 ENCOUNTER — TELEPHONE (OUTPATIENT)
Dept: MEDICAL GROUP | Facility: LAB | Age: 34
End: 2024-03-06
Payer: COMMERCIAL

## 2024-03-06 NOTE — TELEPHONE ENCOUNTER
MEDICATION PRIOR AUTHORIZATION NEEDED:    1. Name of Medication: Zepbound    2. Requested By (Name of Pharmacy): [facility]     3. Is insurance on file current? yes    4. What is the name & phone number of the 3rd party payor? [employer]    Cover My Meds Key # [id number]

## 2024-03-08 ENCOUNTER — TELEMEDICINE (OUTPATIENT)
Dept: BEHAVIORAL HEALTH | Facility: CLINIC | Age: 34
End: 2024-03-08
Payer: COMMERCIAL

## 2024-03-08 DIAGNOSIS — F33.1 MAJOR DEPRESSIVE DISORDER, RECURRENT EPISODE, MODERATE (HCC): ICD-10-CM

## 2024-03-08 DIAGNOSIS — F51.4 NIGHT TERROR DISORDER: ICD-10-CM

## 2024-03-08 DIAGNOSIS — F43.12 CHRONIC POST-TRAUMATIC STRESS DISORDER (PTSD): ICD-10-CM

## 2024-03-08 DIAGNOSIS — F41.1 GENERALIZED ANXIETY DISORDER: ICD-10-CM

## 2024-03-08 PROCEDURE — 99214 OFFICE O/P EST MOD 30 MIN: CPT | Mod: 95 | Performed by: PSYCHIATRY & NEUROLOGY

## 2024-03-08 RX ORDER — FLUOXETINE HYDROCHLORIDE 20 MG/1
20 CAPSULE ORAL DAILY
Qty: 135 CAPSULE | Refills: 0 | Status: SHIPPED | OUTPATIENT
Start: 2024-03-08 | End: 2024-06-06

## 2024-03-08 RX ORDER — PRAZOSIN HYDROCHLORIDE 2 MG/1
2 CAPSULE ORAL NIGHTLY
Qty: 90 CAPSULE | Refills: 0 | Status: SHIPPED | OUTPATIENT
Start: 2024-03-08 | End: 2024-06-06

## 2024-03-08 NOTE — PROGRESS NOTES
[facility]   Follow Up Assessment  This evaluation was conducted via Zoom using secure and encrypted videoconferencing technology. The patient was in a private location outside of their home in the [facility].    The patient's identity was confirmed and verbal consent was obtained for this virtual visit.   This visit was conducted via Zoom using secure and encrypted videoconferencing technology.  The patient was in a private location in the [facility].  The patient's identity was confirmed and verbal consent was obtained for this virtual visit.    This provider informed the patient their medical records are totally confidential except for the use by other providers involved in their care, or if the patient signs a release, or to report instances of child or elder abuse, or if it is determined they are an immediate risk to harm themselves or others.    Name: Jaqui Kumar  MRN: [id number]  : 1990  Age: 34 y.o.  Date of assessment: 3/8/2024  PCP: Joon Zamudio M.D.    Subjective:  Reviewed prior to the virtual visit at her place of employment in a secure location.  She was last seen on .  We reviewed her current medication combination, misses, doses, etc.  Continue prazosin 2 mg at bedtime.  Continue a total of 30 mg of Prozac every morning.  We talked briefly about the controlled substance problem locally affecting the pharmacies.    Objective:  She is alert, oriented, and cooperative.  Relatedness is good.  Grooming is good.  Speech is normal rate.  Anxious.  Memory is good.  Insight and judgment are good.  No indication of psychotic thinking.    Current Risk:       Suicidal: Not suicidal       Homicidal: Not homicidal       Self-Harm: No plan to harm self       Relapse(Low/Moderate/High):: Moderate       Crisis Safety Plan Reviewed: Discussed with patient    Diagnosis:   Major depressive disorder, recurrent  Chronic PTSD  Generalized anxiety disorder with  panic attacks  Nightmares    Treatment Plan:  The current treatment plan consists of quarterly psychiatric sessions designed to evaluate her depression, PTSD, anxiety and panic attacks, and nightmares.    Duration will be for a minimum of 12 months and will be reviewed at each visit.    Goals: Remission of depression with control of anxiety and panic attacks and elimination of nightmares in order to prevent relapse due to the chronic nature of her behavioral health problems and mental illness.  Continue prazosin 20 mg at bedtime.  Continue a total of 30 mg of Prozac every morning.  She will contact us prior to running out of Xanax 0.25 mg which she uses sparingly.      Vinny Fishman M.D.    This note was created using voice recognition software (Dragon). The accuracy of the dictation is limited by the abilities of the software. I have reviewed the note prior to signing, however some errors in grammar and context are still possible. If you have any questions related to this note please do not hesitate to contact our office.

## 2024-03-12 ENCOUNTER — APPOINTMENT (OUTPATIENT)
Dept: MEDICAL GROUP | Facility: LAB | Age: 34
End: 2024-03-12
Payer: COMMERCIAL

## 2024-03-12 ENCOUNTER — HOSPITAL ENCOUNTER (OUTPATIENT)
Dept: LAB | Facility: MEDICAL CENTER | Age: 34
End: 2024-03-12
Attending: ORTHOPAEDIC SURGERY
Payer: COMMERCIAL

## 2024-03-12 DIAGNOSIS — Z96.651 S/P TOTAL KNEE REPLACEMENT, RIGHT: ICD-10-CM

## 2024-03-12 LAB
BASOPHILS # BLD AUTO: 0.7 % (ref 0–1.8)
BASOPHILS # BLD: 0.04 K/UL (ref 0–0.12)
CRP SERPL HS-MCNC: 0.54 MG/DL (ref 0–0.75)
EOSINOPHIL # BLD AUTO: 0.19 K/UL (ref 0–0.51)
EOSINOPHIL NFR BLD: 3.3 % (ref 0–6.9)
ERYTHROCYTE [DISTWIDTH] IN BLOOD BY AUTOMATED COUNT: 39.8 FL (ref 35.9–50)
ERYTHROCYTE [SEDIMENTATION RATE] IN BLOOD BY WESTERGREN METHOD: 17 MM/HOUR (ref 0–25)
HCT VFR BLD AUTO: 38.5 % (ref 37–47)
HGB BLD-MCNC: 13.3 G/DL (ref 12–16)
IMM GRANULOCYTES # BLD AUTO: 0.01 K/UL (ref 0–0.11)
IMM GRANULOCYTES NFR BLD AUTO: 0.2 % (ref 0–0.9)
LYMPHOCYTES # BLD AUTO: 2.3 K/UL (ref 1–4.8)
LYMPHOCYTES NFR BLD: 40.1 % (ref 22–41)
MCH RBC QN AUTO: 28.2 PG (ref 27–33)
MCHC RBC AUTO-ENTMCNC: 34.5 G/DL (ref 32.2–35.5)
MCV RBC AUTO: 81.6 FL (ref 81.4–97.8)
MONOCYTES # BLD AUTO: 0.35 K/UL (ref 0–0.85)
MONOCYTES NFR BLD AUTO: 6.1 % (ref 0–13.4)
NEUTROPHILS # BLD AUTO: 2.85 K/UL (ref 1.82–7.42)
NEUTROPHILS NFR BLD: 49.6 % (ref 44–72)
NRBC # BLD AUTO: 0 K/UL
NRBC BLD-RTO: 0 /100 WBC (ref 0–0.2)
PLATELET # BLD AUTO: 320 K/UL (ref 164–446)
PMV BLD AUTO: 11 FL (ref 9–12.9)
RBC # BLD AUTO: 4.72 M/UL (ref 4.2–5.4)
WBC # BLD AUTO: 5.7 K/UL (ref 4.8–10.8)

## 2024-03-12 PROCEDURE — 36415 COLL VENOUS BLD VENIPUNCTURE: CPT

## 2024-03-12 PROCEDURE — 85652 RBC SED RATE AUTOMATED: CPT

## 2024-03-12 PROCEDURE — 85025 COMPLETE CBC W/AUTO DIFF WBC: CPT

## 2024-03-12 PROCEDURE — 86140 C-REACTIVE PROTEIN: CPT

## 2024-03-13 ENCOUNTER — TELEPHONE (OUTPATIENT)
Dept: MEDICAL GROUP | Facility: LAB | Age: 34
End: 2024-03-13

## 2024-03-13 ENCOUNTER — TELEMEDICINE (OUTPATIENT)
Dept: MEDICAL GROUP | Facility: LAB | Age: 34
End: 2024-03-13
Payer: COMMERCIAL

## 2024-03-13 DIAGNOSIS — E66.01 CLASS 3 SEVERE OBESITY DUE TO EXCESS CALORIES WITH SERIOUS COMORBIDITY AND BODY MASS INDEX (BMI) OF 40.0 TO 44.9 IN ADULT (HCC): ICD-10-CM

## 2024-03-13 DIAGNOSIS — Z96.651 STATUS POST TOTAL RIGHT KNEE REPLACEMENT: ICD-10-CM

## 2024-03-13 DIAGNOSIS — M79.7 FIBROMYALGIA: ICD-10-CM

## 2024-03-13 PROBLEM — E66.813 CLASS 3 SEVERE OBESITY DUE TO EXCESS CALORIES WITH SERIOUS COMORBIDITY AND BODY MASS INDEX (BMI) OF 40.0 TO 44.9 IN ADULT (HCC): Status: ACTIVE | Noted: 2024-03-13

## 2024-03-13 PROBLEM — E66.9 OBESITY (BMI 30-39.9): Status: RESOLVED | Noted: 2017-09-22 | Resolved: 2024-03-13

## 2024-03-13 PROBLEM — M25.561 RIGHT KNEE PAIN: Status: RESOLVED | Noted: 2023-09-29 | Resolved: 2024-03-13

## 2024-03-13 PROCEDURE — 99214 OFFICE O/P EST MOD 30 MIN: CPT | Mod: 95 | Performed by: FAMILY MEDICINE

## 2024-03-13 ASSESSMENT — PATIENT HEALTH QUESTIONNAIRE - PHQ9: CLINICAL INTERPRETATION OF PHQ2 SCORE: 0

## 2024-03-13 NOTE — TELEPHONE ENCOUNTER
[facility]  [phone]   Fax clinical to [phone]  [facility] request for Zepbound through [employer].  Case ID # [id number]  Reference # [id number]        Zepbound is needing auth for quantity.

## 2024-03-13 NOTE — PROGRESS NOTES
Virtual Visit: Established Patient   This visit was conducted via Zoom using secure and encrypted videoconferencing technology.   The patient was in their home in the [facility].    The patient's identity was confirmed and verbal consent was obtained for this virtual visit.    Subjective:   CC:   Chief Complaint   Patient presents with    Medication Management     Follow up    Lab Results     Jaqui Kumar is a 34 y.o. female presenting follow-up on medications and general follow-up.  Still having significant pain and swelling to the right leg and knee 3 months post total knee replacement.  Had recent labs including CBC and inflammatory markers through orthopedics which were normal.  Does have a CT scan pending.  Does report episodes of feeling hot or cold or where different parts of her body will feel hot or cold.    She is doing very well with tirzepatide 2.5 mg weekly.  Seeing success with weight loss without significant side effects.  She notes that she thinks this is actually helped with mood improvement as well.    ROS   See HPI    Current medicines (including changes today)  Current Outpatient Medications   Medication Sig Dispense Refill    FLUoxetine (PROZAC) 20 MG Cap Take 1 Capsule by mouth every day for 90 days. 135 Capsule 0    prazosin (MINIPRESS) 2 MG Cap Take 1 Capsule by mouth every evening for 90 days. 90 Capsule 0    Tirzepatide-Weight Management 2.5 MG/0.5ML Solution Auto-injector Inject 0.5 mL under the skin every 7 days. 2 mL 3    meloxicam (MOBIC) 15 MG tablet TAKE 1 TABLET BY MOUTH EVERY DAY 30 Tablet 1    metFORMIN ER (GLUCOPHAGE XR) 500 MG TABLET SR 24 HR Take 1 Tablet by mouth 2 times a day. 180 Tablet 3    diclofenac sodium (VOLTAREN) 1 % Gel Apply 1 Application topically 2 times a day.      methocarbamol (ROBAXIN) 750 MG Tab Take 750 mg by mouth 3 times a day as needed.      triamcinolone acetonide (KENALOG) 0.1 % Cream Apply 1 Application topically 2 times a day. 28.4 g 1     acetaminophen (TYLENOL) 500 MG Tab Take 500-1,000 mg by mouth every 6 hours as needed for Moderate Pain.      omeprazole (PRILOSEC) 20 MG delayed-release capsule Take 20 mg by mouth every day.  0     No current facility-administered medications for this visit.       Patient Active Problem List    Diagnosis Date Noted    Night terror disorder 11/13/2023    Right knee pain 09/29/2023    Tricompartment osteoarthritis of right knee 09/29/2023    Major depressive disorder, recurrent episode, moderate (HCC) 08/08/2023    Generalized anxiety disorder 08/08/2023    Chronic post-traumatic stress disorder (PTSD) 07/30/2021    Fibromyalgia 07/30/2021    Lateral epicondylitis of both elbows 07/09/2021    Complex regional pain syndrome type 1 of right upper extremity 07/09/2021    Right leg paresthesias 05/06/2020    Current moderate episode of major depressive disorder (HCC) 05/06/2020    Extensor tendon disruption 02/28/2020    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    Physical Exam:  Constitutional: Alert, no distress, well-groomed.  Skin: No rashes in visible areas.  Eye: Round. Conjunctiva clear, lids normal. No icterus.   ENMT: Lips pink without lesions, good dentition, moist mucous membranes. Phonation normal.  Neck: No masses, no thyromegaly. Moves freely without pain.  Respiratory: Unlabored respiratory effort, no cough or audible wheeze  Psych: Alert and oriented x3, normal affect and mood.     Assessment and Plan:   The following treatment plan was discussed:     1. Status post total right knee replacement  Continued pain and swelling to the right knee and leg 3 months post TKA.  Working up through orthopedics with recent reassuring CBC and inflammatory markers.  CT is pending.    2. Fibromyalgia  Question whether fibromyalgia or CRPS  could be contributing to some of her persistent symptoms including right leg symptoms as above as well as issues with hot and cold intolerance.  Will check CMP and TSH.  - Comp Metabolic Panel; Future  - TSH WITH REFLEX TO FT4; Future    3. Class 3 severe obesity due to excess calories with serious comorbidity and body mass index (BMI) of 40.0 to 44.9 in adult (HCC)  Seeing successful weight loss with tirzepatide 2.5 mg weekly without significant side effects.  Continue tirzepatide 2.5 mg weekly.      Follow-up: No follow-ups on file.

## 2024-03-27 ENCOUNTER — HOSPITAL ENCOUNTER (OUTPATIENT)
Dept: RADIOLOGY | Facility: MEDICAL CENTER | Age: 34
End: 2024-03-27
Attending: ORTHOPAEDIC SURGERY
Payer: COMMERCIAL

## 2024-03-27 DIAGNOSIS — M25.561 ACUTE PAIN OF RIGHT KNEE: ICD-10-CM

## 2024-03-27 DIAGNOSIS — Z96.651 S/P TOTAL KNEE REPLACEMENT, RIGHT: ICD-10-CM

## 2024-03-27 PROCEDURE — 73700 CT LOWER EXTREMITY W/O DYE: CPT | Mod: RT

## 2024-04-16 ENCOUNTER — PATIENT MESSAGE (OUTPATIENT)
Dept: MEDICAL GROUP | Facility: LAB | Age: 34
End: 2024-04-16
Payer: COMMERCIAL

## 2024-04-16 ENCOUNTER — TELEPHONE (OUTPATIENT)
Dept: MEDICAL GROUP | Facility: LAB | Age: 34
End: 2024-04-16
Payer: COMMERCIAL

## 2024-04-16 DIAGNOSIS — Z90.3 HISTORY OF SLEEVE GASTRECTOMY: ICD-10-CM

## 2024-04-16 DIAGNOSIS — E66.01 CLASS 3 SEVERE OBESITY DUE TO EXCESS CALORIES WITH SERIOUS COMORBIDITY AND BODY MASS INDEX (BMI) OF 40.0 TO 44.9 IN ADULT (HCC): ICD-10-CM

## 2024-04-16 NOTE — TELEPHONE ENCOUNTER
Information regarding your request  An active PA is already on file with expiration date of 10/10/2024. Please wait to resubmit request within 60 days of that expiration date to obtain a PA renewal.

## 2024-04-16 NOTE — TELEPHONE ENCOUNTER
"Rm, from [employer] calling regarding patient  And is requesting a \"quantity  limit approval\" for Szevbound 2.5 mg.    Rm said she left a message last week regarding this and has not heard a response.    The approval request submitted last April has been cancelled and provider needs to submit another request.    Contact for Rm #[phone]  "

## 2024-04-23 RX ORDER — TIRZEPATIDE 5 MG/.5ML
0.5 INJECTION, SOLUTION SUBCUTANEOUS
Qty: 2 ML | Refills: 3 | Status: SHIPPED | OUTPATIENT
Start: 2024-04-23

## 2024-05-20 ENCOUNTER — TELEMEDICINE (OUTPATIENT)
Dept: BEHAVIORAL HEALTH | Facility: CLINIC | Age: 34
End: 2024-05-20
Payer: COMMERCIAL

## 2024-05-20 DIAGNOSIS — F41.1 GENERALIZED ANXIETY DISORDER: ICD-10-CM

## 2024-05-20 DIAGNOSIS — F43.12 CHRONIC POST-TRAUMATIC STRESS DISORDER (PTSD): ICD-10-CM

## 2024-05-20 DIAGNOSIS — F33.1 MAJOR DEPRESSIVE DISORDER, RECURRENT EPISODE, MODERATE (HCC): ICD-10-CM

## 2024-05-20 DIAGNOSIS — F51.4 NIGHT TERROR DISORDER: ICD-10-CM

## 2024-05-20 PROCEDURE — 99214 OFFICE O/P EST MOD 30 MIN: CPT | Mod: 95 | Performed by: PSYCHIATRY & NEUROLOGY

## 2024-05-20 RX ORDER — ALPRAZOLAM 0.25 MG/1
0.25 TABLET ORAL DAILY
Qty: 30 TABLET | Refills: 0 | Status: SHIPPED | OUTPATIENT
Start: 2024-05-20 | End: 2024-06-19

## 2024-05-20 RX ORDER — PRAZOSIN HYDROCHLORIDE 1 MG/1
1 CAPSULE ORAL NIGHTLY
Qty: 90 CAPSULE | Refills: 0 | Status: SHIPPED | OUTPATIENT
Start: 2024-05-20 | End: 2024-08-18

## 2024-05-20 RX ORDER — FLUOXETINE HYDROCHLORIDE 20 MG/1
20 CAPSULE ORAL EVERY MORNING
Qty: 135 CAPSULE | Refills: 0 | Status: SHIPPED | OUTPATIENT
Start: 2024-05-20 | End: 2024-08-18

## 2024-05-20 NOTE — PROGRESS NOTES
[facility]   Follow Up Assessment  This evaluation was conducted via Zoom using secure and encrypted videoconferencing technology. The patient was in a private location outside of their home in the [facility].    The patient's identity was confirmed and verbal consent was obtained for this virtual visit.    This visit was conducted via Zoom using secure and encrypted videoconferencing technology.  The patient was in a private location in the [facility].  The patient's identity was confirmed and verbal consent was obtained for this virtual visit.    This provider informed the patient their medical records are totally confidential except for the use by other providers involved in their care, or if the patient signs a release, or to report instances of child or elder abuse, or if it is determined they are an immediate risk to harm themselves or others.    Name: Jaqui Kumar  MRN: [id number]  : 1990  Age: 34 y.o.  Date of assessment: 2024  PCP: Joon Zamudio M.D.    Subjective:  Chart reviewed prior to the virtual visit in her vehicle.  She was last seen on .  Prozac 30 mg total every morning is helping her depression.  She is no longer experiencing nightmares on prazosin either 1 mg or 2 mg at bedtime.  Xanax 0.25 mg on a as needed only basis is helping her anxiety and panic attacks.    Objective:  She is alert, oriented, and cooperative.  Relatedness is good.  Grooming is good.  Speech is normal rate.  Less anxious.  Memory is good.  Insight and judgment are good.  No indication of psychotic thinking.    Current Risk:       Suicidal: Not suicidal       Homicidal: Not homicidal       Self-Harm: No plan to harm self       Relapse(Low/Moderate/High):: Moderate       Crisis Safety Plan Reviewed: Discussed with patient    Diagnosis:   Major depressive disorder, recurrent  Chronic PTSD  Generalized anxiety disorder with panic attacks  Nightmares    Treatment  Plan:  The current treatment plan consists of quarterly psychiatric sessions designed to evaluate her depression, anxiety and panic attacks, and nightmares.     Duration will be for a minimum of 12 months and will be reviewed at each visit.    Goals: Remission of depression with control of anxiety and panic attacks and elimination of nightmares in order to prevent relapse due to the chronic nature of her behavioral health problems and mental illness.  Continue prazosin 1 mg at bedtime.  Continue Prozac 30 mg total a.m.  Use Xanax 0.25 mg very sparingly.      Vinny Fishman M.D.    This note was created using voice recognition software (Dragon). The accuracy of the dictation is limited by the abilities of the software. I have reviewed the note prior to signing, however some errors in grammar and context are still possible. If you have any questions related to this note please do not hesitate to contact our office.

## 2024-06-28 ENCOUNTER — OFFICE VISIT (OUTPATIENT)
Dept: URGENT CARE | Facility: PHYSICIAN GROUP | Age: 34
End: 2024-06-28
Payer: COMMERCIAL

## 2024-06-28 VITALS
HEART RATE: 95 BPM | TEMPERATURE: 96.7 F | OXYGEN SATURATION: 100 % | HEIGHT: 66 IN | WEIGHT: 246.47 LBS | RESPIRATION RATE: 18 BRPM | BODY MASS INDEX: 39.61 KG/M2 | DIASTOLIC BLOOD PRESSURE: 88 MMHG | SYSTOLIC BLOOD PRESSURE: 132 MMHG

## 2024-06-28 DIAGNOSIS — R05.1 ACUTE COUGH: ICD-10-CM

## 2024-06-28 DIAGNOSIS — J04.0 LARYNGITIS: ICD-10-CM

## 2024-06-28 DIAGNOSIS — B96.89 ACUTE BACTERIAL SINUSITIS: ICD-10-CM

## 2024-06-28 DIAGNOSIS — J02.9 PHARYNGITIS, UNSPECIFIED ETIOLOGY: ICD-10-CM

## 2024-06-28 DIAGNOSIS — J01.90 ACUTE BACTERIAL SINUSITIS: ICD-10-CM

## 2024-06-28 LAB — S PYO DNA SPEC NAA+PROBE: NOT DETECTED

## 2024-06-28 RX ORDER — PREDNISONE 10 MG/1
40 TABLET ORAL DAILY
Qty: 20 TABLET | Refills: 0 | Status: SHIPPED | OUTPATIENT
Start: 2024-06-28 | End: 2024-07-03

## 2024-06-28 RX ORDER — IPRATROPIUM BROMIDE AND ALBUTEROL SULFATE 2.5; .5 MG/3ML; MG/3ML
3 SOLUTION RESPIRATORY (INHALATION) ONCE
Status: COMPLETED | OUTPATIENT
Start: 2024-06-28 | End: 2024-06-28

## 2024-06-28 RX ORDER — DEXTROMETHORPHAN HYDROBROMIDE AND PROMETHAZINE HYDROCHLORIDE 15; 6.25 MG/5ML; MG/5ML
5 SYRUP ORAL EVERY 6 HOURS PRN
Qty: 118 ML | Refills: 0 | Status: SHIPPED | OUTPATIENT
Start: 2024-06-28 | End: 2024-07-05

## 2024-06-28 RX ORDER — ALBUTEROL SULFATE 90 UG/1
2 AEROSOL, METERED RESPIRATORY (INHALATION) EVERY 6 HOURS PRN
Qty: 8.5 G | Refills: 0 | Status: SHIPPED | OUTPATIENT
Start: 2024-06-28

## 2024-06-28 RX ORDER — AMOXICILLIN 500 MG/1
500 CAPSULE ORAL 2 TIMES DAILY
Qty: 20 CAPSULE | Refills: 0 | Status: SHIPPED | OUTPATIENT
Start: 2024-06-28 | End: 2024-07-08

## 2024-06-28 RX ADMIN — IPRATROPIUM BROMIDE AND ALBUTEROL SULFATE 3 ML: 2.5; .5 SOLUTION RESPIRATORY (INHALATION) at 15:47

## 2024-06-28 ASSESSMENT — ENCOUNTER SYMPTOMS
VOMITING: 0
MYALGIAS: 0
CONSTIPATION: 0
NAUSEA: 0
HEADACHES: 1
ABDOMINAL PAIN: 0
CHILLS: 0
EYE PAIN: 0
SORE THROAT: 1
SINUS PAIN: 0
DIARRHEA: 0
SPUTUM PRODUCTION: 1
COUGH: 1
FEVER: 0
SHORTNESS OF BREATH: 0

## 2024-06-28 ASSESSMENT — FIBROSIS 4 INDEX: FIB4 SCORE: 0.6

## 2024-06-28 NOTE — LETTER
June 28, 2024    To Whom It May Concern:         This is confirmation that Jaqui Kumar attended her scheduled appointment with Joshua Camargo P.A.-C. on 6/28/24.  I recommend this patient rest her voice for the next 5 days and consider remaining home to recuperate for the next 72 hours.          If you have any questions please do not hesitate to call me at the phone number listed below.    Sincerely,          Joshua Camargo P.A.-C.  [phone]

## 2024-06-28 NOTE — PROGRESS NOTES
"Subjective:   Jaqui Kumar is a 34 y.o. female who presents for Pharyngitis (Loss of voice X 2 weeks. Progressively worsening. OTC med (Allergy med, Nyquil) not helping)      34-year-old female notes 2-week history of symptoms, initially had more of a viral upper respiratory tract infection with cough and congestion sore throat malaise and fatigue.  Some of the symptoms have resolved however she has been left with a hoarse voice, sore throat, generalized malaise and fatigue.  She has had a worsening of her allergies with more congestion and postnasal drip.  She has had a cough, worse at night.  She has no known history of asthma.  She denies any significant sinus pain or pressure, otalgia, fevers or chills    Review of Systems   Constitutional:  Positive for malaise/fatigue. Negative for chills and fever.   HENT:  Positive for congestion and sore throat. Negative for ear pain and sinus pain.    Eyes:  Negative for pain.   Respiratory:  Positive for cough and sputum production. Negative for shortness of breath.    Cardiovascular:  Negative for chest pain.   Gastrointestinal:  Negative for abdominal pain, constipation, diarrhea, nausea and vomiting.   Genitourinary:  Negative for dysuria.   Musculoskeletal:  Negative for myalgias.   Skin:  Negative for rash.   Neurological:  Positive for headaches.       Medications, Allergies, and current problem list reviewed today in Epic.     Objective:     /88 (BP Location: Left arm, Patient Position: Sitting, BP Cuff Size: Adult)   Pulse 95   Temp 35.9 °C (96.7 °F) (Temporal)   Resp 18   Ht 1.676 m (5' 6\")   Wt 112 kg (246 lb 7.6 oz)   SpO2 100%     Physical Exam  Vitals reviewed.   Constitutional:       Appearance: Normal appearance.   HENT:      Head: Normocephalic and atraumatic.      Right Ear: Tympanic membrane, ear canal and external ear normal.      Left Ear: Tympanic membrane, ear canal and external ear normal.      Nose: Congestion and rhinorrhea " present.      Mouth/Throat:      Mouth: Mucous membranes are moist.      Pharynx: No oropharyngeal exudate or posterior oropharyngeal erythema.      Comments: POST NASAL DRIP  Eyes:      Conjunctiva/sclera: Conjunctivae normal.   Cardiovascular:      Rate and Rhythm: Normal rate and regular rhythm.   Pulmonary:      Effort: Pulmonary effort is normal.      Comments: Diminished at base  Musculoskeletal:      Cervical back: Normal range of motion.   Lymphadenopathy:      Cervical: No cervical adenopathy.   Skin:     General: Skin is warm and dry.      Capillary Refill: Capillary refill takes less than 2 seconds.   Neurological:      Mental Status: She is alert and oriented to person, place, and time.         Assessment/Plan:     Diagnosis and associated orders:     1. Laryngitis  predniSONE (DELTASONE) 10 MG Tab      2. Acute cough  ipratropium-albuterol (DUONEB) nebulizer solution    predniSONE (DELTASONE) 10 MG Tab    promethazine-dextromethorphan (PROMETHAZINE-DM) 6.25-15 MG/5ML syrup    albuterol 108 (90 Base) MCG/ACT Aero Soln inhalation aerosol      3. Pharyngitis, unspecified etiology  POCT CEPHEID GROUP A STREP - PCR      4. Acute bacterial sinusitis  amoxicillin (AMOXIL) 500 MG Cap         Comments/MDM:     Strep testing negative  Patient with significant and drastic subjective improvement after DuoNeb treatment and likewise improved vesicular breath sounds.  Will send albuterol to pharmacy as well as a course of prednisone which should help with her reactive airway or bronchitis symptoms in addition to the albuterol.  Patient warned about sedation from the cough suppressant and to use carefully at night.  Concerned about potential borderline sinus infection so she is not showing improvement over the next 72 hours she can start the amoxicillin although currently I think she will be best served with antihistamine, nasal steroid and the above prescribed medications.         Differential diagnosis, natural  history, supportive care, and indications for immediate follow-up discussed.    Advised the patient to follow-up with the primary care physician for recheck, reevaluation, and consideration of further management.    Please note that this dictation was created using voice recognition software. I have made a reasonable attempt to correct obvious errors, but I expect that there are errors of grammar and possibly content that I did not discover before finalizing the note.    This note was electronically signed by Joshua Camargo PA-C

## 2024-07-02 ENCOUNTER — OFFICE VISIT (OUTPATIENT)
Dept: URGENT CARE | Facility: PHYSICIAN GROUP | Age: 34
End: 2024-07-02
Payer: COMMERCIAL

## 2024-07-02 ENCOUNTER — HOSPITAL ENCOUNTER (OUTPATIENT)
Dept: RADIOLOGY | Facility: MEDICAL CENTER | Age: 34
End: 2024-07-02
Attending: FAMILY MEDICINE
Payer: COMMERCIAL

## 2024-07-02 VITALS
HEART RATE: 92 BPM | HEIGHT: 66 IN | BODY MASS INDEX: 38.57 KG/M2 | TEMPERATURE: 97.2 F | DIASTOLIC BLOOD PRESSURE: 70 MMHG | SYSTOLIC BLOOD PRESSURE: 110 MMHG | OXYGEN SATURATION: 100 % | WEIGHT: 240 LBS | RESPIRATION RATE: 18 BRPM

## 2024-07-02 DIAGNOSIS — R05.1 ACUTE COUGH: ICD-10-CM

## 2024-07-02 PROCEDURE — 3074F SYST BP LT 130 MM HG: CPT | Performed by: FAMILY MEDICINE

## 2024-07-02 PROCEDURE — 99213 OFFICE O/P EST LOW 20 MIN: CPT | Performed by: FAMILY MEDICINE

## 2024-07-02 PROCEDURE — 3078F DIAST BP <80 MM HG: CPT | Performed by: FAMILY MEDICINE

## 2024-07-02 PROCEDURE — 71046 X-RAY EXAM CHEST 2 VIEWS: CPT

## 2024-07-02 ASSESSMENT — FIBROSIS 4 INDEX: FIB4 SCORE: 0.6

## 2024-07-22 ENCOUNTER — TELEMEDICINE (OUTPATIENT)
Dept: BEHAVIORAL HEALTH | Facility: CLINIC | Age: 34
End: 2024-07-22
Payer: COMMERCIAL

## 2024-07-22 DIAGNOSIS — F33.1 MAJOR DEPRESSIVE DISORDER, RECURRENT EPISODE, MODERATE (HCC): ICD-10-CM

## 2024-07-22 DIAGNOSIS — F43.12 CHRONIC POST-TRAUMATIC STRESS DISORDER (PTSD): ICD-10-CM

## 2024-07-22 DIAGNOSIS — F41.1 GENERALIZED ANXIETY DISORDER: ICD-10-CM

## 2024-07-22 PROCEDURE — 90837 PSYTX W PT 60 MINUTES: CPT | Performed by: MARRIAGE & FAMILY THERAPIST

## 2024-07-23 ENCOUNTER — HOSPITAL ENCOUNTER (OUTPATIENT)
Dept: RADIOLOGY | Facility: MEDICAL CENTER | Age: 34
End: 2024-07-23
Attending: NURSE PRACTITIONER
Payer: COMMERCIAL

## 2024-07-23 ENCOUNTER — TELEMEDICINE (OUTPATIENT)
Dept: BEHAVIORAL HEALTH | Facility: CLINIC | Age: 34
End: 2024-07-23
Payer: COMMERCIAL

## 2024-07-23 DIAGNOSIS — F41.1 GENERALIZED ANXIETY DISORDER: ICD-10-CM

## 2024-07-23 DIAGNOSIS — M54.12 CERVICAL RADICULOPATHY: ICD-10-CM

## 2024-07-23 DIAGNOSIS — F33.1 MAJOR DEPRESSIVE DISORDER, RECURRENT EPISODE, MODERATE (HCC): ICD-10-CM

## 2024-07-23 DIAGNOSIS — F51.4 NIGHT TERROR DISORDER: ICD-10-CM

## 2024-07-23 DIAGNOSIS — M25.511 RIGHT SHOULDER PAIN, UNSPECIFIED CHRONICITY: ICD-10-CM

## 2024-07-23 DIAGNOSIS — F43.12 CHRONIC POST-TRAUMATIC STRESS DISORDER (PTSD): ICD-10-CM

## 2024-07-23 PROCEDURE — 99214 OFFICE O/P EST MOD 30 MIN: CPT | Performed by: PSYCHIATRY & NEUROLOGY

## 2024-07-23 PROCEDURE — 72141 MRI NECK SPINE W/O DYE: CPT

## 2024-07-23 PROCEDURE — 73221 MRI JOINT UPR EXTREM W/O DYE: CPT | Mod: RT

## 2024-07-23 RX ORDER — FLUOXETINE HYDROCHLORIDE 40 MG/1
40 CAPSULE ORAL DAILY
Qty: 90 CAPSULE | Refills: 0 | Status: SHIPPED | OUTPATIENT
Start: 2024-07-23 | End: 2024-10-21

## 2024-07-23 RX ORDER — PRAZOSIN HYDROCHLORIDE 1 MG/1
1 CAPSULE ORAL NIGHTLY
Qty: 90 CAPSULE | Refills: 0 | Status: SHIPPED | OUTPATIENT
Start: 2024-07-23 | End: 2024-10-21

## 2024-07-29 ENCOUNTER — HOSPITAL ENCOUNTER (OUTPATIENT)
Facility: MEDICAL CENTER | Age: 34
End: 2024-07-29
Attending: PHYSICIAN ASSISTANT
Payer: COMMERCIAL

## 2024-07-29 PROCEDURE — 88175 CYTOPATH C/V AUTO FLUID REDO: CPT

## 2024-08-01 LAB
CYTOLOGIST CVX/VAG CYTO: ABNORMAL
CYTOLOGY CVX/VAG DOC CYTO: ABNORMAL
CYTOLOGY CVX/VAG DOC THIN PREP: ABNORMAL
HPV I/H RISK 4 DNA CVX QL PROBE+SIG AMP: POSITIVE
HPV16 DNA CVX QL PROBE+SIG AMP: NEGATIVE
HPV18+45 E6+E7 MRNA CVX QL NAA+PROBE: NEGATIVE
NOTE NL11727A: ABNORMAL
OTHER STN SPEC: ABNORMAL
PATHOLOGIST CVX/VAG CYTO: ABNORMAL
STAT OF ADQ CVX/VAG CYTO-IMP: ABNORMAL

## 2024-08-05 ENCOUNTER — TELEMEDICINE (OUTPATIENT)
Dept: BEHAVIORAL HEALTH | Facility: CLINIC | Age: 34
End: 2024-08-05
Payer: COMMERCIAL

## 2024-08-05 DIAGNOSIS — F41.1 GENERALIZED ANXIETY DISORDER: ICD-10-CM

## 2024-08-05 DIAGNOSIS — F33.1 MAJOR DEPRESSIVE DISORDER, RECURRENT EPISODE, MODERATE (HCC): ICD-10-CM

## 2024-08-05 PROCEDURE — 90837 PSYTX W PT 60 MINUTES: CPT | Mod: 95 | Performed by: MARRIAGE & FAMILY THERAPIST

## 2024-08-05 NOTE — PROGRESS NOTES
[facility]   Therapy Progress Note      This visit was conducted via Zoom using secure and encrypted videoconferencing technology.  The patient was in a private location in the [facility].  The patient's identity was confirmed and verbal consent was obtained for this virtual visit.  Place of Service: POS 10 -The patient is at Home during their visit          Name: Jaqui Kumar  MRN: [id number]  : 1990  Age: 34 y.o.  Date of assessment: 2024  PCP: Joon Zamudio M.D.  Persons in attendance: Patient  Total session time: 56 minutes    Objective Observations:   Participation:Active verbal participation, Attentive, Engaged, and Open to feedback   Grooming:Casual and Neat   Cognition:Alert and Fully Oriented   Eye Contact:Good   Mood:Euthymic   Affect:Flexible and Full range   Thought Process:Logical and Goal-directed   Speech:Rate within normal limits and Volume within normal limits    Current Risk:   Suicide: low   Homicide: low   Self-Harm: low   Relapse: low   Safety Plan Reviewed: not applicable    Topics addressed in psychotherapy include: Met with the patient via MS Teams for an individual counseling session.      Content of Therapy:   The patient discussed that she recently fell off a horse and injured herself.  The patient discussed her horse injury.  Patient discussed that she has decided not to sell her horse trailer to a friend who wanted to buy it.  Patient discuss that it was not a good plan to put herself in debt unnecessarily.  Patient discussed that her dog recently had puppies and she is caring for them.  Patient discussed various issues with a [employer] group who she is working with.  Patient discussed that she is not had any changes at work.    Therapeutic Intervention:   This session, the therapeutic focus was on a identifying the skills the patient is using.  Discussed with the patient her setting a boundaries with her friend.  Discussed with the patient her  balance of time caring for the puppies.  Discussed with the patient Me/We/Us time as we have focused on self care.      This dictation has been created using voice recognition software and/or scribes. The accuracy of the dictation is limited by the abilities of the software and the expertise of the scribes. I expect there may be some errors of grammar and possibly content. I made every attempt to manually correct the errors within my dictation. However, errors related to voice recognition software and/or scribes may still exist and should be interpreted within the appropriate context.    Care Plan Updated: No    Does patient express agreement with the above plan? Yes     Diagnosis:  1. Major depressive disorder, recurrent episode, moderate (HCC)    2. Generalized anxiety disorder        Referral appointment(s) scheduled? No       LENA Landeros.

## 2024-08-19 ENCOUNTER — TELEMEDICINE (OUTPATIENT)
Dept: BEHAVIORAL HEALTH | Facility: CLINIC | Age: 34
End: 2024-08-19
Payer: COMMERCIAL

## 2024-08-19 DIAGNOSIS — F33.1 MAJOR DEPRESSIVE DISORDER, RECURRENT EPISODE, MODERATE (HCC): ICD-10-CM

## 2024-08-19 DIAGNOSIS — F43.12 CHRONIC POST-TRAUMATIC STRESS DISORDER (PTSD): ICD-10-CM

## 2024-08-19 DIAGNOSIS — F41.1 GENERALIZED ANXIETY DISORDER: ICD-10-CM

## 2024-08-19 PROCEDURE — 90837 PSYTX W PT 60 MINUTES: CPT | Mod: 95 | Performed by: MARRIAGE & FAMILY THERAPIST

## 2024-08-19 NOTE — LETTER
The following plan is in draft form.  Please refer to the current version for the most up-to-date information.                Behavioral Health - Outpatient 24   Effective from: 2024  Effective to: 2025    Draft  Plan ID: [id number]               Participants       You: Jaqui    Your team: Therapist MILKA Brown (Therapist); Physician Vinny Fishman M.D. 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   2020 - Present     Overview Signed 2020  8:01 AM by Diagnosis Update     Unclear diagnosis, follows with optho  IMO load 2020         Bilateral carpal tunnel syndrome G56.03   2020 - Present     RESOLVED: Ulnar nerve compression G56.20   2020 - 10/23/2020     Extensor tendon disruption M67.89   2020 - Present     Right leg paresthesias R20.2   2020 - Present     Current moderate episode of major depressive disorder (HCC) F32.1   2020 - Present     Lateral epicondylitis of both elbows M77.11, M77.12   2021 - Present     Complex regional pain syndrome type 1 of right upper extremity G90.511   2021 - Present     Chronic post-traumatic stress disorder (PTSD) F43.12   2021 - Present     Fibromyalgia M79.7   2021 - Present     RESOLVED: Severe episode of recurrent major depressive disorder, without psychotic features (HCC) F33.2   2021 - 2022     Major depressive disorder, recurrent episode, moderate (HCC)  F33.1   8/8/2023 - Present     Generalized anxiety disorder F41.1   8/8/2023 - Present     RESOLVED: Right knee pain M25.561   9/29/2023 - 3/13/2024     Tricompartment osteoarthritis of right knee M17.11   9/29/2023 - Present     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   11/13/2023 - Present     Class 3 severe obesity due to excess calories with serious comorbidity and body mass index (BMI) of 40.0 to 44.9 in adult ([facility]) E66.01, Z68.41   3/13/2024 - Present      Current Medications            naproxen (NAPROSYN) 500 MG Tab    fluoxetine (PROZAC) 40 MG capsule    prazosin (MINIPRESS) 1 MG Cap    albuterol 108 (90 Base) MCG/ACT Aero Soln inhalation aerosol    fluoxetine (PROZAC) 20 MG tablet    diclofenac sodium (VOLTAREN) 1 % Gel    methocarbamol (ROBAXIN) 750 MG Tab    triamcinolone acetonide (KENALOG) 0.1 % Cream    acetaminophen (TYLENOL) 500 MG Tab    omeprazole (PRILOSEC) 20 MG delayed-release capsule          Treatment Plan Note       Therapist MILKA Brown Last edited by Therapist MILKA Brown on 8/19/2024  7:43 AM         Discussed treatment plan         Care Plan: Anxiety       Problem: Anxiety       Long-Range Goal: Reduce overall tension and anxiety       Short-Term Goal: Verbalize understanding of thoughts, feelings, and behavior components of anxiety and treatment       Intervention: (Clinician) will teach 3 schemas       Responsible User: MILKA Landeros              Intervention: (Clinician) will teach 3 ANTs (Automatic Negative Thoughts)       Responsible User: MILKA Landeros              Intervention: (Clinician) will teach about the connection between thoughts, feelings, and behavior       Responsible User: MILKA Landeros              Intervention: (Clinician) will discuss Hypothetical Event Worry       Responsible User: MILKA Landeros              Intervention: (Clinician) will discuss the mental avoidance in worry       Responsible User: MILKA Landeros               Intervention: (Clinician) will discuss the importance of recognizing problems before they occur, seeing problems as  part of a normal life and review problem solving skills       Responsible User: MILKA Landeros                      Short-Term Goal: Learn and implement coping skills to increase tolerance to anxiety       Intervention: (Clinician) will teach 3 coping skills and encourage patient to practice daily       Responsible User: MILKA Landeros              Intervention: (Clinician) will teach 3 Cognitive Behavioral Therapy skills       Responsible User: MILKA Landeros              Intervention: (Clinician) will teach 3 grounding exercises       Responsible User: MILKA Landeros              Intervention: (Clinician) will teach 3 relaxation techniques       Responsible User: MILKA Landeros              Intervention: (Clinician) will help patient recognize and overcome intolerance of uncertainty       Responsible User: MILKA Landeros                      Short-Term Goal: Verbalize understanding of the role cognitive biases play in irrational worry       Intervention: (Clinician) will teach about Real Event Worry       Responsible User: MILKA Landeros              Intervention: (Clinician) will discuss Hypothetical Event Worry       Responsible User: MILKA Landeros              Intervention: (Clinician) will help patient identify two automatic thoughts and identify challenges       Responsible User: MILKA Landeros                      Short-Term Goal: Client will be able to identify emotional and physiological signs of anxiety               Short-Term Goal: Client will be able to accurately rate on a point scale the level of anxiety they are experiencing       Intervention: (Clinician) will discuss with patient what they worried about this week and the impact it had on feelings and behavior       Responsible User: Shona Rodriguez  MILKA              Intervention: (Clinician) will teach about mindfulness       Responsible User: MILKA Landeros              Intervention: (Clinician) will encourage worry awareness training       Responsible User: MILKA Landeros                      Short-Term Goal: Recognize and resolve underlying conflicts contributing to anxiety       Intervention: (Clinician) will teach and help implement strategies to limit association between environment and anxiety       Responsible User: MILKA Landeros              Intervention: (Clinician) will help patient to identify core beliefs       Responsible User: MILKA Landeros              Intervention: (Clinician) will discuss the difference between catastrophes and significantly unpleasant events       Responsible User: MILKA Landeros              Intervention: (Clinician) will teach about Real Event Worry       Responsible User: MILKA Landeros              Intervention: (Clinician) will discuss Hypothetical Event Worry       Responsible User: MILKA Landeros              Intervention: (Clinician) will help patient identify 5 underlying conflicts contributing to anxiety       Responsible User: MILKA Landeros                                        Signatures             Patient:  Jaqui Mcdonnellzabeth Stacy  Date              Parent / Legal Guardian Signature                    Please Print Name  Relationship to Patient                   Signature  Date

## 2024-08-19 NOTE — PROGRESS NOTES
[facility]   Therapy Progress Note      This visit was conducted via MS Teams  using secure and encrypted videoconferencing technology.  The patient was in a private location in the state of [place].  The patient's identity was confirmed and verbal consent was obtained for this virtual visit.  Place of Service: POS 10 -The patient is at Home during their visit           Name: Jaqui Kumar  MRN: [id number]  : 1990  Age: 34 y.o.  Date of assessment: 2024  PCP: Joon Zamudio M.D.  Persons in attendance: Patient  Total session time: 57 minutes    Objective Observations:   Participation:Active verbal participation, Attentive, and Engaged   Grooming:Casual   Cognition:Alert and Fully Oriented   Eye Contact:Good   Mood:Anxious   Affect:Flexible, Full range, and Congruent with content   Thought Process:Logical and Goal-directed   Speech:Rate within normal limits and Volume within normal limits    Current Risk:   Suicide: low   Homicide: low   Self-Harm: low   Relapse: low   Safety Plan Reviewed: not applicable    Topics addressed in psychotherapy include: Met with the patient via MS Teams for an individual counseling session.      Content of Therapy:   The Patient reported that she is very tired and slept through five alarms today.  The patient reported she is exhausted with her chronic shoulder pain and fibromyalgia.  Patient reported that on Saturday she received a very large award from the [employer] for her numerous volunteer hours.  Patient reported that she attended a friends of baby shower with mixed emotions.  Patient discussed her friend not even knowing she was pregnant and not being equipped to care for another child.  The patient discussed that she wonders if she has attention deficit hyperactivity disorder.    Therapeutic Intervention:   This session, the therapeutic focus was on assisting the patient in problem solving skills while validating her  boundaries with friendships.  Explored with the patient her feelings of her friends baby shower and her own feelings of possibly never having children of her own.  Patient discussed never being able to share things with their mother was passed away.  Discussed with the patient her level of childhood trauma and different parenting modalities.  Discussed with patient adhd and suggested that she look at the website [employer].    This dictation has been created using voice recognition software and/or scribes. The accuracy of the dictation is limited by the abilities of the software and the expertise of the scribes. I expect there may be some errors of grammar and possibly content. I made every attempt to manually correct the errors within my dictation. However, errors related to voice recognition software and/or scribes may still exist and should be interpreted within the appropriate context.    Discussed ADHD, Discussed parenting and how she was raised. Discussed childhood trauma.     Care Plan Updated: Yes    Does patient express agreement with the above plan? Yes     Diagnosis:  1. Generalized anxiety disorder    2. Major depressive disorder, recurrent episode, moderate (HCC)    3. Chronic post-traumatic stress disorder (PTSD)        Referral appointment(s) scheduled? No       LENA Landeros.

## 2024-09-03 ENCOUNTER — PATIENT MESSAGE (OUTPATIENT)
Dept: BEHAVIORAL HEALTH | Facility: CLINIC | Age: 34
End: 2024-09-03
Payer: COMMERCIAL

## 2024-09-03 RX ORDER — FLUOXETINE 40 MG/1
40 CAPSULE ORAL DAILY
Qty: 90 CAPSULE | Refills: 0 | Status: SHIPPED | OUTPATIENT
Start: 2024-09-03 | End: 2024-12-02

## 2024-09-03 RX ORDER — PRAZOSIN HYDROCHLORIDE 1 MG/1
1 CAPSULE ORAL NIGHTLY
Qty: 90 CAPSULE | Refills: 0 | Status: SHIPPED | OUTPATIENT
Start: 2024-09-03 | End: 2024-12-02

## 2024-09-30 ENCOUNTER — APPOINTMENT (OUTPATIENT)
Dept: BEHAVIORAL HEALTH | Facility: CLINIC | Age: 34
End: 2024-09-30
Payer: COMMERCIAL

## 2024-10-01 PROBLEM — M75.101 ROTATOR CUFF TEAR, RIGHT: Status: ACTIVE | Noted: 2024-10-01

## 2024-10-01 PROBLEM — M25.511 RIGHT SHOULDER PAIN: Status: ACTIVE | Noted: 2024-10-01

## 2024-10-08 ENCOUNTER — PATIENT MESSAGE (OUTPATIENT)
Dept: MEDICAL GROUP | Facility: LAB | Age: 34
End: 2024-10-08
Payer: COMMERCIAL

## 2024-10-08 DIAGNOSIS — E66.813 CLASS 3 SEVERE OBESITY DUE TO EXCESS CALORIES WITH SERIOUS COMORBIDITY AND BODY MASS INDEX (BMI) OF 40.0 TO 44.9 IN ADULT (HCC): ICD-10-CM

## 2024-10-08 DIAGNOSIS — E66.01 CLASS 3 SEVERE OBESITY DUE TO EXCESS CALORIES WITH SERIOUS COMORBIDITY AND BODY MASS INDEX (BMI) OF 40.0 TO 44.9 IN ADULT (HCC): ICD-10-CM

## 2024-10-08 RX ORDER — TIRZEPATIDE 5 MG/.5ML
0.5 INJECTION, SOLUTION SUBCUTANEOUS
Qty: 6 ML | Refills: 3 | Status: SHIPPED | OUTPATIENT
Start: 2024-10-08 | End: 2024-10-15

## 2024-11-04 ENCOUNTER — APPOINTMENT (OUTPATIENT)
Dept: RADIOLOGY | Facility: MEDICAL CENTER | Age: 34
End: 2024-11-04
Attending: NURSE PRACTITIONER
Payer: COMMERCIAL

## 2024-11-11 ENCOUNTER — HOSPITAL ENCOUNTER (OUTPATIENT)
Dept: RADIOLOGY | Facility: MEDICAL CENTER | Age: 34
End: 2024-11-11
Attending: NURSE PRACTITIONER
Payer: COMMERCIAL

## 2024-11-11 DIAGNOSIS — M25.551 RIGHT HIP PAIN: ICD-10-CM

## 2024-11-11 DIAGNOSIS — M25.552 LEFT HIP PAIN: ICD-10-CM

## 2024-11-11 PROCEDURE — 73522 X-RAY EXAM HIPS BI 3-4 VIEWS: CPT

## 2024-12-13 ENCOUNTER — TELEMEDICINE (OUTPATIENT)
Dept: TELEHEALTH | Facility: TELEMEDICINE | Age: 34
End: 2024-12-13
Payer: COMMERCIAL

## 2024-12-13 ENCOUNTER — TELEMEDICINE (OUTPATIENT)
Dept: BEHAVIORAL HEALTH | Facility: CLINIC | Age: 34
End: 2024-12-13
Payer: COMMERCIAL

## 2024-12-13 DIAGNOSIS — F41.1 GENERALIZED ANXIETY DISORDER: ICD-10-CM

## 2024-12-13 DIAGNOSIS — B96.89 BACTERIAL SINUSITIS: ICD-10-CM

## 2024-12-13 DIAGNOSIS — F33.1 MAJOR DEPRESSIVE DISORDER, RECURRENT EPISODE, MODERATE (HCC): ICD-10-CM

## 2024-12-13 DIAGNOSIS — J32.9 BACTERIAL SINUSITIS: ICD-10-CM

## 2024-12-13 DIAGNOSIS — F43.12 CHRONIC POST-TRAUMATIC STRESS DISORDER (PTSD): ICD-10-CM

## 2024-12-13 PROCEDURE — 90837 PSYTX W PT 60 MINUTES: CPT | Mod: 95 | Performed by: MARRIAGE & FAMILY THERAPIST

## 2024-12-13 PROCEDURE — 99213 OFFICE O/P EST LOW 20 MIN: CPT | Mod: 95 | Performed by: FAMILY MEDICINE

## 2024-12-13 NOTE — PROGRESS NOTES
Virtual Visit: Established Patient   This visit was conducted via Teams using secure and encrypted videoconferencing technology.   The patient was in their home in the [facility].    The patient's identity was confirmed and verbal consent was obtained for this virtual visit.     Subjective:   CC: No chief complaint on file.      Jaqui Kumar is a 34 y.o. female presenting for evaluation and management of:    Cold symptoms that started 1 week ago.  She is experiencing headache, increased sinus pressure, runny nose, sore throat, cough, and diarrhea.  No fever.  No tobacco product use.  No history of asthma or COPD.  She is vaccinated against COVID.  No known sick contacts.    ROS       Current medicines (including changes today)  Current Outpatient Medications   Medication Sig Dispense Refill    amoxicillin-clavulanate (AUGMENTIN) 875-125 MG Tab Take 1 Tablet by mouth 2 times a day for 5 days. 10 Tablet 0    Tirzepatide-Weight Management 5 MG/0.5ML Solution Auto-injector Inject 0.5 mL under the skin every 7 days. 6 mL 3    naproxen (NAPROSYN) 500 MG Tab Take 1 Tablet by mouth 2 times a day with meals. 60 Tablet 0    diclofenac sodium (VOLTAREN) 1 % Gel Apply 1 Application topically 2 times a day.      methocarbamol (ROBAXIN) 750 MG Tab Take 750 mg by mouth 3 times a day as needed.      acetaminophen (TYLENOL) 500 MG Tab Take 500-1,000 mg by mouth every 6 hours as needed for Moderate Pain.      omeprazole (PRILOSEC) 20 MG delayed-release capsule Take 20 mg by mouth every day.  0     No current facility-administered medications for this visit.       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    Tricompartment osteoarthritis of right knee 09/29/2023     Major depressive disorder, recurrent episode, moderate (HCC) 08/08/2023    Generalized anxiety disorder 08/08/2023    Chronic post-traumatic stress disorder (PTSD) 07/30/2021    Fibromyalgia 07/30/2021    Lateral epicondylitis of both elbows 07/09/2021    Complex regional pain syndrome type 1 of right upper extremity 07/09/2021    Right leg paresthesias 05/06/2020    Current moderate episode of major depressive disorder (HCC) 05/06/2020    Extensor tendon disruption 02/28/2020    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    Physical Exam:  Constitutional: Alert, no distress, well-groomed.  Skin: No rashes in visible areas.  Eye: Round. Conjunctiva clear, lids normal. No icterus.   ENMT: Lips pink without lesions, good dentition, moist mucous membranes. Phonation normal.  Neck: No masses, no thyromegaly. Moves freely without pain.  Respiratory: Unlabored respiratory effort, no cough or audible wheeze  Psych: Alert and oriented x3, normal affect and mood.     Assessment and Plan:   The following treatment plan was discussed:     1. Bacterial sinusitis  - amoxicillin-clavulanate (AUGMENTIN) 875-125 MG Tab; Take 1 Tablet by mouth 2 times a day for 5 days.  Dispense: 10 Tablet; Refill: 0      Follow-up: Return if symptoms worsen or fail to improve.

## 2024-12-13 NOTE — LETTER
The following plan is in draft form.  Please refer to the current version for the most up-to-date information.                [facility] - Outpatient 24   Effective from: 2024  Effective to: 2025    Draft  Plan ID: [id number]               Participants       You: Jaqui    Your team: Therapist MILKA Brown (Therapist)          Patient Demographics       Patient Name  Jaqui Kumar Legal Sex  Female   1990 N  [id number] Address  [address]   [place] [address] Phone  [phone] (Home)  [phone] (Mobile) *Preferred*          Problem List as of 2024 Date Reviewed: 2024            ICD-10-CM Priority Class Noted - Resolved Diagnosed    RESOLVED: Obesity (BMI 30-39.9) E66.9   2017 - 3/13/2024     Bilateral lumbar radiculopathy M54.16   2019 - Present     RESOLVED: B12 deficiency E53.8   10/17/2019 - 2020     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   2020 - Present     Overview Signed 2020  8:01 AM by Diagnosis Update     Unclear diagnosis, follows with optho  IMO load 2020         Bilateral carpal tunnel syndrome G56.03   2020 - Present     RESOLVED: Ulnar nerve compression G56.20   2020 - 10/23/2020     Extensor tendon disruption M67.89   2020 - Present     Right leg paresthesias R20.2   2020 - Present     RESOLVED: Current moderate episode of major depressive disorder (HCC) F32.1   2020 - 2024     Lateral epicondylitis of both elbows M77.11, M77.12   2021 - Present     Complex regional pain syndrome type 1 of right upper extremity G90.511   2021 - Present     Chronic post-traumatic stress disorder (PTSD) F43.12   2021 - Present     Fibromyalgia M79.7   2021 - Present     RESOLVED: Severe episode of recurrent major depressive disorder, without psychotic features (HCC) F33.2   2021 - 2022     Major depressive disorder, recurrent episode, moderate (HCC) F33.1   2023 - Present      Generalized anxiety disorder F41.1   8/8/2023 - Present     RESOLVED: Right knee pain M25.561   9/29/2023 - 3/13/2024     Tricompartment osteoarthritis of right knee M17.11   9/29/2023 - Present     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   11/13/2023 - Present     Class 3 severe obesity due to excess calories with serious comorbidity and body mass index (BMI) of 40.0 to 44.9 in adult ([facility]) E66.813, E66.01, Z68.41   3/13/2024 - Present     Right shoulder pain M25.511   10/1/2024 - Present     Rotator cuff tear, right M75.101   10/1/2024 - Present      Current Medications            Tirzepatide-Weight Management 5 MG/0.5ML Solution Auto-injector    naproxen (NAPROSYN) 500 MG Tab    diclofenac sodium (VOLTAREN) 1 % Gel    methocarbamol (ROBAXIN) 750 MG Tab    acetaminophen (TYLENOL) 500 MG Tab    omeprazole (PRILOSEC) 20 MG delayed-release capsule          Treatment Plan Note       Therapist MILKA Brown Last edited by Therapist MILKA Brown on 12/20/2024  8:36 AM           Discussed with the patient treatment goals and plans.         Care Plan: Anxiety       Problem: Anxiety       Long-Range Goal: Reduce overall tension and anxiety       Short-Term Goal: Verbalize understanding of thoughts, feelings, and behavior components of anxiety and treatment       Intervention: (Clinician) will teach 3 schemas       Responsible User: MILKA Landeros              Intervention: (Clinician) will teach 3 ANTs (Automatic Negative Thoughts)       Responsible User: MILKA Landeros              Intervention: (Clinician) will teach about the connection between thoughts, feelings, and behavior       Responsible User: MILKA Landeros              Intervention: (Clinician) will discuss Hypothetical Event Worry       Responsible User: MILKA Landeros              Intervention: (Clinician) will discuss the mental avoidance in worry       Responsible User: MILKA Landeros              Intervention:  (Clinician) will discuss the importance of recognizing problems before they occur, seeing problems as  part of a normal life and review problem solving skills       Responsible User: MILKA Landeros                      Short-Term Goal: Learn and implement coping skills to increase tolerance to anxiety       Intervention: (Clinician) will teach 3 coping skills and encourage patient to practice daily       Responsible User: MILKA Landeros              Intervention: (Clinician) will teach 3 Cognitive Behavioral Therapy skills       Responsible User: MILKA Landeros              Intervention: (Clinician) will teach 3 grounding exercises       Responsible User: MILKA Landeros              Intervention: (Clinician) will teach 3 relaxation techniques       Responsible User: MILKA Landeros              Intervention: (Clinician) will help patient recognize and overcome intolerance of uncertainty       Responsible User: MILKA Landeros                      Short-Term Goal: Verbalize understanding of the role cognitive biases play in irrational worry       Intervention: (Clinician) will teach about Real Event Worry       Responsible User: MILKA Landeros              Intervention: (Clinician) will discuss Hypothetical Event Worry       Responsible User: MILKA Landeros              Intervention: (Clinician) will help patient identify two automatic thoughts and identify challenges       Responsible User: MILKA Landeros                      Short-Term Goal: Client will be able to identify emotional and physiological signs of anxiety               Short-Term Goal: Client will be able to accurately rate on a point scale the level of anxiety they are experiencing       Intervention: (Clinician) will discuss with patient what they worried about this week and the impact it had on feelings and behavior       Responsible User: MILKA Landeros               Intervention: (Clinician) will teach about mindfulness       Responsible User: MILKA Landeros              Intervention: (Clinician) will encourage worry awareness training       Responsible User: MILKA Landeros                      Short-Term Goal: Recognize and resolve underlying conflicts contributing to anxiety       Intervention: (Clinician) will teach and help implement strategies to limit association between environment and anxiety       Responsible User: MILKA Landeros              Intervention: (Clinician) will help patient to identify core beliefs       Responsible User: MILKA Landeros              Intervention: (Clinician) will discuss the difference between catastrophes and significantly unpleasant events       Responsible User: MILKA Landeros              Intervention: (Clinician) will teach about Real Event Worry       Responsible User: MILKA Landeros              Intervention: (Clinician) will discuss Hypothetical Event Worry       Responsible User: MILKA Landeros              Intervention: (Clinician) will help patient identify 5 underlying conflicts contributing to anxiety       Responsible User: MILKA Landeros                                      Problem: Major Depressive Disorder       Long-Range Goal: Decrease signs and symptoms of depression       Short-Term Goal: Patient will identify 3 triggers of depression       Intervention: (Clinician) will work with the patient to identify present stressors       Due Date: 12/27/2024    Responsible User: MILKA Landeros              Intervention: (Clinician) will work with patient to identify thoughts leading to depression       Due Date: 12/27/2024    Responsible User: MILKA Landeros                      Short-Term Goal: Patient will verbalize understanding of symptoms       Intervention: (Clinician) will educate on depression and depression symptoms       Due Date: 12/27/2024     Responsible User: MILKA Landeros                                        Signatures             Patient:  Jaqui Kumar  Date              Parent / Legal Guardian Signature                    Please Print Name  Relationship to Patient                   Signature  Date

## 2024-12-13 NOTE — PROGRESS NOTES
[facility]   Therapy Progress Note      This visit was conducted via MS Teams using secure and encrypted videoconferencing technology.  The patient was in a private location in the [facility].  The patient's identity was confirmed and verbal consent was obtained for this virtual visit.  Place of Service:   POS 10 -The patient is at Home during their visit           Name: Jaqui Kumar  MRN: [id number]  : 1990  Age: 34 y.o.  Date of assessment: 2024  PCP: Joon Zamudio M.D.  Persons in attendance: Patient  Total session time: 56 minutes    Objective Observations:   Participation:Active verbal participation, Attentive, and Engaged   Grooming:Casual   Cognition:Alert and Fully Oriented   Eye Contact:Good   Mood:Euthymic and Anxious   Affect:Flexible, Full range, and Congruent with content   Thought Process:Logical and Goal-directed   Speech:Rate within normal limits, Volume within normal limits, and struggling with pain of recent shoulder surgery    Current Risk:   Suicide: low   Homicide: low   Self-Harm: low   Relapse: low   Safety Plan Reviewed: not applicable    Topics addressed in psychotherapy include: Met with the patient via MS Teams for an individual counseling session. The patient was last seen on 2024.  Content of Therapy:   The patient discussed that she is recovering from recent shoulder surgery and is continuing to try to manage day today care of her home and animals.  The patient discussed that she has time off from work however she is concerned that will there will be significant errors at work that she will need to correct when she returns.  Patient discussed events since our last meeting.  Therapeutic Intervention:   This session, the therapeutic focus was on following up on events since our last meeting.  Discussing plans moving forward in therapy.  Worked with patient on identifying the need for self care and recovery post surgery.  Worked  with the patient on identifying a hypothetical worry and realistic worry in self care.  Plan:  Work with patient on treatment goals and plans, including managing stress, depression and anxiety.   Impression:   1. Major depressive disorder, recurrent episode, moderate (HCC)   2. Generalized anxiety disorder   3. Chronic post-traumatic stress disorder (PTSD)     This dictation has been created using voice recognition software and/or scribes. The accuracy of the dictation is limited by the abilities of the software and the expertise of the scribes. I expect there may be some errors of grammar and possibly content. I made every attempt to manually correct the errors within my dictation. However, errors related to voice recognition software and/or scribes may still exist and should be interpreted within the appropriate context.    Care Plan Updated: Yes    Does patient express agreement with the above plan? Yes     Diagnosis:  1. Major depressive disorder, recurrent episode, moderate (HCC)    2. Generalized anxiety disorder    3. Chronic post-traumatic stress disorder (PTSD)        Referral appointment(s) scheduled? No       LENA Landeros.

## 2024-12-17 PROBLEM — F32.1 CURRENT MODERATE EPISODE OF MAJOR DEPRESSIVE DISORDER (HCC): Status: RESOLVED | Noted: 2020-05-06 | Resolved: 2024-12-17

## 2024-12-19 ENCOUNTER — APPOINTMENT (OUTPATIENT)
Dept: TELEHEALTH | Facility: TELEMEDICINE | Age: 34
End: 2024-12-19
Payer: COMMERCIAL

## 2024-12-20 ENCOUNTER — OFFICE VISIT (OUTPATIENT)
Dept: URGENT CARE | Facility: PHYSICIAN GROUP | Age: 34
End: 2024-12-20
Payer: COMMERCIAL

## 2024-12-20 VITALS
WEIGHT: 246.47 LBS | RESPIRATION RATE: 16 BRPM | HEIGHT: 66 IN | HEART RATE: 84 BPM | DIASTOLIC BLOOD PRESSURE: 80 MMHG | OXYGEN SATURATION: 99 % | TEMPERATURE: 97.3 F | SYSTOLIC BLOOD PRESSURE: 130 MMHG | BODY MASS INDEX: 39.61 KG/M2

## 2024-12-20 DIAGNOSIS — R05.1 ACUTE COUGH: ICD-10-CM
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
## 2024-12-20 DIAGNOSIS — S61.451A DOG BITE OF RIGHT HAND, INITIAL ENCOUNTER: ICD-10-CM

## 2024-12-20 PROCEDURE — 99214 OFFICE O/P EST MOD 30 MIN: CPT

## 2024-12-20 PROCEDURE — 3079F DIAST BP 80-89 MM HG: CPT

## 2024-12-20 PROCEDURE — 3075F SYST BP GE 130 - 139MM HG: CPT

## 2024-12-20 RX ORDER — ALBUTEROL SULFATE 90 UG/1
2 INHALANT RESPIRATORY (INHALATION) EVERY 6 HOURS PRN
Qty: 8.5 G | Refills: 0 | Status: SHIPPED | OUTPATIENT
Start: 2024-12-20

## 2024-12-20 RX ORDER — DEXTROMETHORPHAN HYDROBROMIDE AND PROMETHAZINE HYDROCHLORIDE 15; 6.25 MG/5ML; MG/5ML
5 SYRUP ORAL NIGHTLY PRN
Qty: 118 ML | Refills: 0 | Status: SHIPPED | OUTPATIENT
Start: 2024-12-20

## 2024-12-20 RX ORDER — PREDNISONE 20 MG/1
40 TABLET ORAL DAILY
Qty: 10 TABLET | Refills: 0 | Status: SHIPPED | OUTPATIENT
Start: 2024-12-20 | End: 2024-12-25

## 2024-12-20 ASSESSMENT — ENCOUNTER SYMPTOMS
COUGH: 1
HEADACHES: 0
ABDOMINAL PAIN: 0
SORE THROAT: 1
FEVER: 0
CHILLS: 0
DIARRHEA: 0
NAUSEA: 0
SHORTNESS OF BREATH: 0
VOMITING: 0
MYALGIAS: 0

## 2024-12-20 ASSESSMENT — FIBROSIS 4 INDEX: FIB4 SCORE: 0.6

## 2024-12-20 NOTE — PROGRESS NOTES
"Subjective:   Jaqui Kumar is a 34 y.o. female who presents for Cough (Cough and sore throat have returned. Was on antibiotics 12/6 for 5 days . Got better and returned x 2 weeks      And I was bit by a dog, right finger was an accident . Small scrape puncture.)      Cough  This is a recurrent problem. The current episode started 1 to 4 weeks ago. The problem has been gradually worsening. The cough is Productive of sputum. Associated symptoms include nasal congestion, postnasal drip and a sore throat. Pertinent negatives include no chest pain, chills, ear pain, fever, headaches, myalgias, rash or shortness of breath. Treatments tried: Mary seltzer/Augmentin on 12/6. The treatment provided moderate relief. There is no history of asthma, bronchiectasis, bronchitis, environmental allergies or pneumonia.   Animal Bite  This is a new problem. The current episode started in the past 7 days (Wednesday). The problem has been gradually improving. Associated symptoms include congestion, coughing and a sore throat. Pertinent negatives include no abdominal pain, chest pain, chills, fever, headaches, myalgias, nausea, rash or vomiting. Treatments tried: Bandaging and cleaning with warm water and soap.       Review of Systems   Constitutional:  Negative for chills, fever and malaise/fatigue.   HENT:  Positive for congestion, postnasal drip and sore throat. Negative for ear pain and hearing loss.    Respiratory:  Positive for cough. Negative for shortness of breath.    Cardiovascular:  Negative for chest pain.   Gastrointestinal:  Negative for abdominal pain, diarrhea, nausea and vomiting.   Genitourinary:  Negative for dysuria.   Musculoskeletal:  Negative for myalgias.   Skin:  Negative for rash.   Neurological:  Negative for headaches.   Endo/Heme/Allergies:  Negative for environmental allergies.       Past Medical History:   Diagnosis Date    Anesthesia     \"Grandma coded in surgery, she was ok\" \"mom is super " "sensitive\"    Anxiety     Arrhythmia     bradycardia    Arthritis     osteo-back and knees    Carpal tunnel syndrome     Clotting disorder (HCC)     \"NSAIDS cause blood clots\" per pt    Depression     Migraine     Pain 09/2017    back, knees, feet       Current Outpatient Medications Ordered in Epic   Medication Sig Dispense Refill    predniSONE (DELTASONE) 20 MG Tab Take 2 Tablets by mouth every day for 5 days. 10 Tablet 0    albuterol 108 (90 Base) MCG/ACT Aero Soln inhalation aerosol Inhale 2 Puffs every 6 hours as needed for Shortness of Breath. 8.5 g 0    promethazine-dextromethorphan (PROMETHAZINE-DM) 6.25-15 MG/5ML syrup Take 5 mL by mouth at bedtime as needed for Cough. (caution: may cause sedation) 118 mL 0    Tirzepatide-Weight Management 5 MG/0.5ML Solution Auto-injector Inject 0.5 mL under the skin every 7 days. 6 mL 3    diclofenac sodium (VOLTAREN) 1 % Gel Apply 1 Application topically 2 times a day.      methocarbamol (ROBAXIN) 750 MG Tab Take 750 mg by mouth 3 times a day as needed.      acetaminophen (TYLENOL) 500 MG Tab Take 500-1,000 mg by mouth every 6 hours as needed for Moderate Pain.      omeprazole (PRILOSEC) 20 MG delayed-release capsule Take 20 mg by mouth every day.  0     No current [facility]-ordered facility-administered medications on file.       Past Surgical History:   Procedure Laterality Date    PB SHLDR ARTHROSCOP,SURG,W/ROTAT CUFF REPB Right 12/2/2024    Procedure: RIGHT SHOULDER ARTHROSCOPY ROTATOR CUFF REPAIR;  Surgeon: Clayton Hernandez M.D.;  Location: [facility] [place];  Service: Orthopedics    CO SHLDR ARTHROSCOP PART DEBRIDE 1-2 Right 12/2/2024    Procedure: POSSIBLE RIGHT LABRAL DEBRIDEMENT VERSUS RIGHT LABRAL REPAIR, POSSIBLE RIGHT BICEPS TENODESIS, REPAIR AS INDICATED;  Surgeon: Clayton eHrnandez M.D.;  Location: [facility] [place];  Service: Orthopedics    PB TOTAL KNEE ARTHROPLASTY Right 11/20/2023    Procedure: RIGHT TOTAL KNEE ARTHROPLASTY;  Surgeon: Clayton" "ANALIA Hernandez;  Location: [facility];  Service: Orthopedics    CARPAL TUNNEL RELEASE Right 3/2/2021    Procedure: RELEASE, CARPAL TUNNEL - REVISION AND REVISION MODIFIED NIRSCHEL RADIAL TUNNEL RELEASE;  Surgeon: Todd Murphy M.D.;  Location: SURGERY [facility];  Service: Orthopedics    CARPAL TUNNEL RELEASE Right 09/03/2020    GASTRIC SLEEVE LAPAROSCOPY N/A 9/22/2017    Procedure: GASTRIC SLEEVE LAPAROSCOPY;  Surgeon: Darius Segura M.D.;  Location: SURGERY [facility];  Service: General    LIVER BIOPSY LAPAROSCOPIC N/A 9/22/2017    Procedure: LIVER BIOPSY LAPAROSCOPIC;  Surgeon: Darius Segura M.D.;  Location: SURGERY [facility];  Service: General    OTHER  2011    Ablation, back pain    ABDOMINAL EXPLORATION      EYE SURGERY      HERNIA REPAIR         Social History     Tobacco Use    Smoking status: Never    Smokeless tobacco: Never   Vaping Use    Vaping status: Never Used   Substance Use Topics    Alcohol use: Not Currently     Comment: Since 4/2021    Drug use: Yes     Comment: CBD cream       family history includes Arthritis in her maternal grandfather, maternal grandmother, and mother; Cancer in her mother; Diabetes in her maternal grandfather and maternal grandmother; Hypertension in her maternal grandmother.        Objective:     /80   Pulse 84   Temp 36.3 °C (97.3 °F) (Temporal)   Resp 16   Ht 1.676 m (5' 6\")   Wt 112 kg (246 lb 7.6 oz)   SpO2 99%     Physical Exam  Vitals and nursing note reviewed.   Constitutional:       General: She is not in acute distress.     Appearance: Normal appearance. She is not ill-appearing.   HENT:      Head: Normocephalic and atraumatic.      Right Ear: Tympanic membrane normal.      Left Ear: Tympanic membrane normal.      Nose: Congestion present. No rhinorrhea.      Mouth/Throat:      Mouth: Mucous membranes are moist.   Eyes:      Conjunctiva/sclera: Conjunctivae normal.   Cardiovascular:      Rate and Rhythm: Normal " rate.      Heart sounds: Normal heart sounds.   Pulmonary:      Effort: Pulmonary effort is normal. No respiratory distress.      Breath sounds: No stridor. No wheezing or rhonchi.   Abdominal:      General: Abdomen is flat.      Palpations: Abdomen is soft.   Musculoskeletal:         General: Normal range of motion.      Cervical back: Normal range of motion.   Skin:     General: Skin is warm and dry.      Capillary Refill: Capillary refill takes less than 2 seconds.      Findings: Abrasion present.          Neurological:      Mental Status: She is alert and oriented to person, place, and time.   Psychiatric:         Mood and Affect: Mood normal.         Behavior: Behavior normal.         Assessment/Plan:       1. Acute cough  predniSONE (DELTASONE) 20 MG Tab    albuterol 108 (90 Base) MCG/ACT Aero Soln inhalation aerosol    promethazine-dextromethorphan (PROMETHAZINE-DM) 6.25-15 MG/5ML syrup      2. Dog bite of right hand, initial encounter          After assessment patient here with persisting cough for the past 2 weeks after recently being treated for sinus infection.  Patient reports that the sinus pressure and pain along with congestion did improve after receiving Augmentin via virtual visit on the sixth of this month.  Patient reports that she is still had a persisting cough.  Patient denies any shortness of breath or wheezing.  Patient does not have any significant respiratory history.  Upon examination patient's lung sounds were clear via auscultation patient was in no respiratory distress.  This time I did provide patient prescription for prednisone, albuterol, promethazine cough syrup.  Patient did also have concerns for a bite to the right hand after she attempted to break up her dogs.  Patient does have few scattered abrasions to right hand that are scabbed with no evidence of infection at this time.  Patient has been keeping covered with Band-Aid and clean with warm soap and water.  Patient encouraged  to continue cleaning with warm soap and water and leaving covered as needed.  At this time there does not appear to be any evidence of infection.  Patient instructed to monitor for any worsening signs and symptoms of any other concerns patient was instructed to return to urgent care for reevaluation.    Differential diagnosis, natural history, and supportive care discussed. We also reviewed side effects of medication including allergic response, GI upset, tendon injury, rash, sedation etc. Patient and/or guardian voices understanding.      Advised the patient to follow-up with the primary care physician for recheck, reevaluation, and consideration of further management.    I personally reviewed prior external notes and test results pertinent to today's visit as well as additional imaging and testing completed in clinic today.     Please note that this dictation was created using voice recognition software. I have made every reasonable attempt to correct obvious errors, but I expect that there are errors of grammar and possibly content that I did not discover before finalizing the note.    This note was electronically signed by ESTRELLA Morrison

## 2024-12-30 ENCOUNTER — OFFICE VISIT (OUTPATIENT)
Dept: BEHAVIORAL HEALTH | Facility: CLINIC | Age: 34
End: 2024-12-30
Payer: COMMERCIAL

## 2024-12-30 DIAGNOSIS — F33.1 MAJOR DEPRESSIVE DISORDER, RECURRENT EPISODE, MODERATE (HCC): ICD-10-CM

## 2024-12-30 DIAGNOSIS — F41.1 GENERALIZED ANXIETY DISORDER: ICD-10-CM

## 2024-12-30 DIAGNOSIS — F43.12 CHRONIC POST-TRAUMATIC STRESS DISORDER (PTSD): ICD-10-CM

## 2024-12-30 NOTE — LETTER
Behavioral [facility] - Outpatient 24   Effective from: 2024  Effective to: 3/30/2025    Plan ID: [id number]                Participants       You: Jaqui    Your team: Therapist MILKA Brown (Therapist)           Patient Demographics       Patient Name  Jaqui Kumar Legal Sex  Female   1990 [address]  [id number] Address  [address]   [place] [address] Phone  [phone] (Mobile) *Preferred*           Problem List as of 2024 Date Reviewed: 2024               ICD-10-CM Priority Class Noted - Resolved Diagnosed    RESOLVED: Obesity (BMI 30-39.9) E66.9   2017 - 3/13/2024     Bilateral lumbar radiculopathy M54.16   2019 - Present     RESOLVED: B12 deficiency E53.8   10/17/2019 - 2020     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   2020 - Present     Overview Signed 2020  8:01 AM by Diagnosis Update     Unclear diagnosis, follows with optho  IMO load 2020         Bilateral carpal tunnel syndrome G56.03   2020 - Present     RESOLVED: Ulnar nerve compression G56.20   2020 - 10/23/2020     Extensor tendon disruption M67.89   2020 - Present     Right leg paresthesias R20.2   2020 - Present     RESOLVED: Current moderate episode of major depressive disorder (HCC) F32.1   2020 - 2024     Lateral epicondylitis of both elbows M77.11, M77.12   2021 - Present     Complex regional pain syndrome type 1 of right upper extremity G90.511   2021 - Present     Chronic post-traumatic stress disorder (PTSD) F43.12   2021 - Present     Fibromyalgia M79.7   2021 - Present     RESOLVED: Severe episode of recurrent major depressive disorder, without psychotic features (HCC) F33.2   2021 - 2022     Major depressive disorder, recurrent episode, moderate (HCC) F33.1   2023 - Present     Generalized anxiety disorder F41.1   2023 - Present     RESOLVED: Right knee pain M25.561   2023 - 3/13/2024     Tricompartment  osteoarthritis of right knee M17.11   9/29/2023 - Present     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   11/13/2023 - Present     Class 3 severe obesity due to excess calories with serious comorbidity and body mass index (BMI) of 40.0 to 44.9 in adult ([facility]) E66.813, E66.01, Z68.41   3/13/2024 - Present     Right shoulder pain M25.511   10/1/2024 - Present     Rotator cuff tear, right M75.101   10/1/2024 - Present            Current Medications       naproxen (NAPROSYN) 500 MG Tab    albuterol 108 (90 Base) MCG/ACT Aero Soln inhalation aerosol    promethazine-dextromethorphan (PROMETHAZINE-DM) 6.25-15 MG/5ML syrup    Tirzepatide-Weight Management 5 MG/0.5ML Solution Auto-injector    diclofenac sodium (VOLTAREN) 1 % Gel    methocarbamol (ROBAXIN) 750 MG Tab    acetaminophen (TYLENOL) 500 MG Tab    omeprazole (PRILOSEC) 20 MG delayed-release capsule           Treatment Plan Note       Therapist MILKA Brown Last edited by Therapist MILKA Brown on 12/30/2024  5:11 PM PST         Work with patient on treatment goals of managing emotions.          Care Plan: Anxiety       Problem: Anxiety       Long-Range Goal: Reduce overall tension and anxiety       Short-Term Goal: Verbalize understanding of thoughts, feelings, and behavior components of anxiety and treatment       Intervention: (Clinician) will teach 3 schemas       Responsible User: MILKA Landeros              Intervention: (Clinician) will teach 3 ANTs (Automatic Negative Thoughts)       Responsible User: MILKA Landeros              Intervention: (Clinician) will teach about the connection between thoughts, feelings, and behavior       Responsible User: MILKA Landeros              Intervention: (Clinician) will discuss Hypothetical Event Worry       Responsible User: MILKA Landeros              Intervention: (Clinician) will discuss the mental avoidance in worry       Responsible User: MILKA Landeros               Intervention: (Clinician) will discuss the importance of recognizing problems before they occur, seeing problems as  part of a normal life and review problem solving skills       Responsible User: MILKA Landeros                      Short-Term Goal: Learn and implement coping skills to increase tolerance to anxiety       Intervention: (Clinician) will teach 3 coping skills and encourage patient to practice daily       Responsible User: MILKA Landeros              Intervention: (Clinician) will teach 3 Cognitive Behavioral Therapy skills       Responsible User: MILKA Landeros              Intervention: (Clinician) will teach 3 grounding exercises       Responsible User: MILKA Landeros              Intervention: (Clinician) will teach 3 relaxation techniques       Responsible User: MILKA Landeros              Intervention: (Clinician) will help patient recognize and overcome intolerance of uncertainty       Responsible User: MILKA Landeros                      Short-Term Goal: Verbalize understanding of the role cognitive biases play in irrational worry       Intervention: (Clinician) will teach about Real Event Worry       Responsible User: MILKA Landeros              Intervention: (Clinician) will discuss Hypothetical Event Worry       Responsible User: MILKA Landeros              Intervention: (Clinician) will help patient identify two automatic thoughts and identify challenges       Responsible User: MILKA Landeros                      Short-Term Goal: Client will be able to identify emotional and physiological signs of anxiety               Short-Term Goal: Client will be able to accurately rate on a point scale the level of anxiety they are experiencing       Intervention: (Clinician) will discuss with patient what they worried about this week and the impact it had on feelings and behavior       Responsible User: MILKA Landeros               Intervention: (Clinician) will teach about mindfulness       Responsible User: MILKA Landeros              Intervention: (Clinician) will encourage worry awareness training       Responsible User: MILKA Landeros                      Short-Term Goal: Recognize and resolve underlying conflicts contributing to anxiety       Intervention: (Clinician) will teach and help implement strategies to limit association between environment and anxiety       Responsible User: MILKA Landeros              Intervention: (Clinician) will help patient to identify core beliefs       Responsible User: MILKA Landeros              Intervention: (Clinician) will discuss the difference between catastrophes and significantly unpleasant events       Responsible User: MILKA Landeros              Intervention: (Clinician) will teach about Real Event Worry       Responsible User: MILKA Landeros              Intervention: (Clinician) will discuss Hypothetical Event Worry       Responsible User: MILKA Landeros              Intervention: (Clinician) will help patient identify 5 underlying conflicts contributing to anxiety       Responsible User: MILKA Landeros                                      Problem: Major Depressive Disorder       Long-Range Goal: Decrease signs and symptoms of depression       Short-Term Goal: Patient will identify 3 triggers of depression       Intervention: (Clinician) will work with the patient to identify present stressors       Due Date: 1/6/2025    Responsible User: MILKA Landeros              Intervention: (Clinician) will work with patient to identify thoughts leading to depression       Due Date: 1/6/2025    Responsible User: MILKA Landeros                      Short-Term Goal: Patient will verbalize understanding of symptoms       Intervention: (Clinician) will educate on depression and depression symptoms       Due Date:  1/6/2025    Responsible User: MILKA Landeros                      Short-Term Goal: Patient's support system will participate in treatment       Intervention: (Clinician) will educate family on signs/symptoms of depression       Due Date: 1/6/2025    Responsible User: MILKA Landeros              Intervention: (Clinician) will educate family on coping skills       Due Date: 1/6/2025    Responsible User: MILKA Landeros              Intervention: (Clinician) will educate family on medication management       Due Date: 1/6/2025    Responsible User: MILKA Landeros                              Long-Range Goal: Patient will better manage their depression       Intervention: (Clinician) will encourage group therapy       Due Date: 1/6/2025    Responsible User: MILKA Landeros              Intervention: (Clinician) will emphasize the importance of medication adherence       Due Date: 1/6/2025    Responsible User: MILKA Landeros              Short-Term Goal: Patient will identify 4+ coping skills       Intervention: (Clinician) will work with patient to identify coping skills and encourage patient to practice daily       Due Date: 1/6/2025    Responsible User: MILKA Landeros              Intervention: (Clinician) will help patient practice daily       Due Date: 1/6/2025    Responsible User: MILKA Landeros              Intervention: (Clinician) will assist to find potential sources of hope       Due Date: 1/6/2025    Responsible User: MILKA Landeros              Intervention: (Clinician) will teach 3 Cognitive Behavioral Therapy skills       Due Date: 1/6/2025    Responsible User: MILKA Landeros                      Short-Term Goal: Patient will manage their medication       Intervention: (Clinician) will emphasize the importance of medication adherence       Due Date: 1/6/2025    Responsible User: MILKA Landeros                                         Signatures             Patient:  Jaqui Kumar  Date              Parent / Legal Guardian Signature                    Please Print Name  Relationship to Patient                   Signature  Date

## 2024-12-30 NOTE — LETTER
The following plan is in draft form.  Please refer to the current version for the most up-to-date information.                [facility] - Outpatient 24   Effective from: 2024  Effective to: 3/30/2025    Draft  Plan ID: [id number]               Participants       You: Jaqui    Your team: Therapist MILKA Brown (Therapist)          Patient Demographics       Patient Name  Jaqui Kumar Legal Sex  Female   1990 N  [id number] Address  [address]   [place] [address] Phone  [phone] (Mobile) *Preferred*          Problem List as of 2024 Date Reviewed: 2024            ICD-10-CM Priority Class Noted - Resolved Diagnosed    RESOLVED: Obesity (BMI 30-39.9) E66.9   2017 - 3/13/2024     Bilateral lumbar radiculopathy M54.16   2019 - Present     RESOLVED: B12 deficiency E53.8   10/17/2019 - 2020     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   2020 - Present     Overview Signed 2020  8:01 AM by Diagnosis Update     Unclear diagnosis, follows with optho  IMO load 2020         Bilateral carpal tunnel syndrome G56.03   2020 - Present     RESOLVED: Ulnar nerve compression G56.20   2020 - 10/23/2020     Extensor tendon disruption M67.89   2020 - Present     Right leg paresthesias R20.2   2020 - Present     RESOLVED: Current moderate episode of major depressive disorder (HCC) F32.1   2020 - 2024     Lateral epicondylitis of both elbows M77.11, M77.12   2021 - Present     Complex regional pain syndrome type 1 of right upper extremity G90.511   2021 - Present     Chronic post-traumatic stress disorder (PTSD) F43.12   2021 - Present     Fibromyalgia M79.7   2021 - Present     RESOLVED: Severe episode of recurrent major depressive disorder, without psychotic features (HCC) F33.2   2021 - 2022     Major depressive disorder, recurrent episode, moderate (HCC) F33.1   2023 - Present     Generalized  anxiety disorder F41.1   8/8/2023 - Present     RESOLVED: Right knee pain M25.561   9/29/2023 - 3/13/2024     Tricompartment osteoarthritis of right knee M17.11   9/29/2023 - Present     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   11/13/2023 - Present     Class 3 severe obesity due to excess calories with serious comorbidity and body mass index (BMI) of 40.0 to 44.9 in adult ([facility]) E66.813, E66.01, Z68.41   3/13/2024 - Present     Right shoulder pain M25.511   10/1/2024 - Present     Rotator cuff tear, right M75.101   10/1/2024 - Present      Current Medications            naproxen (NAPROSYN) 500 MG Tab    albuterol 108 (90 Base) MCG/ACT Aero Soln inhalation aerosol    promethazine-dextromethorphan (PROMETHAZINE-DM) 6.25-15 MG/5ML syrup    Tirzepatide-Weight Management 5 MG/0.5ML Solution Auto-injector    diclofenac sodium (VOLTAREN) 1 % Gel    methocarbamol (ROBAXIN) 750 MG Tab    acetaminophen (TYLENOL) 500 MG Tab    omeprazole (PRILOSEC) 20 MG delayed-release capsule          Treatment Plan Note     No Program Progress note has been created for the current plan.         Care Plan: Anxiety       Problem: Anxiety       Long-Range Goal: Reduce overall tension and anxiety       Short-Term Goal: Verbalize understanding of thoughts, feelings, and behavior components of anxiety and treatment       Intervention: (Clinician) will teach 3 schemas       Responsible User: MILKA Landeros              Intervention: (Clinician) will teach 3 ANTs (Automatic Negative Thoughts)       Responsible User: MILKA Landeros              Intervention: (Clinician) will teach about the connection between thoughts, feelings, and behavior       Responsible User: MILKA Landeros              Intervention: (Clinician) will discuss Hypothetical Event Worry       Responsible User: MILKA Landeros              Intervention: (Clinician) will discuss the mental avoidance in worry       Responsible User: MILKA Landeros               Intervention: (Clinician) will discuss the importance of recognizing problems before they occur, seeing problems as  part of a normal life and review problem solving skills       Responsible User: MILKA Landeros                      Short-Term Goal: Learn and implement coping skills to increase tolerance to anxiety       Intervention: (Clinician) will teach 3 coping skills and encourage patient to practice daily       Responsible User: MILKA Landeros              Intervention: (Clinician) will teach 3 Cognitive Behavioral Therapy skills       Responsible User: MILKA Landeros              Intervention: (Clinician) will teach 3 grounding exercises       Responsible User: MILKA Landeros              Intervention: (Clinician) will teach 3 relaxation techniques       Responsible User: MILKA Landeros              Intervention: (Clinician) will help patient recognize and overcome intolerance of uncertainty       Responsible User: MILKA Landeros                      Short-Term Goal: Verbalize understanding of the role cognitive biases play in irrational worry       Intervention: (Clinician) will teach about Real Event Worry       Responsible User: MILKA Landeros              Intervention: (Clinician) will discuss Hypothetical Event Worry       Responsible User: MILKA Landeros              Intervention: (Clinician) will help patient identify two automatic thoughts and identify challenges       Responsible User: MILKA Landeros                      Short-Term Goal: Client will be able to identify emotional and physiological signs of anxiety               Short-Term Goal: Client will be able to accurately rate on a point scale the level of anxiety they are experiencing       Intervention: (Clinician) will discuss with patient what they worried about this week and the impact it had on feelings and behavior       Responsible User: MILKA Landeros               Intervention: (Clinician) will teach about mindfulness       Responsible User: MILKA Landeros              Intervention: (Clinician) will encourage worry awareness training       Responsible User: MILKA Landeros                      Short-Term Goal: Recognize and resolve underlying conflicts contributing to anxiety       Intervention: (Clinician) will teach and help implement strategies to limit association between environment and anxiety       Responsible User: MILKA Landeros              Intervention: (Clinician) will help patient to identify core beliefs       Responsible User: MILKA Landeros              Intervention: (Clinician) will discuss the difference between catastrophes and significantly unpleasant events       Responsible User: MILKA Landeros              Intervention: (Clinician) will teach about Real Event Worry       Responsible User: MILKA Landeros              Intervention: (Clinician) will discuss Hypothetical Event Worry       Responsible User: MILKA Landeros              Intervention: (Clinician) will help patient identify 5 underlying conflicts contributing to anxiety       Responsible User: MILKA Landeros                                      Problem: Major Depressive Disorder       Long-Range Goal: Decrease signs and symptoms of depression       Short-Term Goal: Patient will identify 3 triggers of depression       Intervention: (Clinician) will work with the patient to identify present stressors       Due Date: 1/6/2025    Responsible User: MILKA Landeros              Intervention: (Clinician) will work with patient to identify thoughts leading to depression       Due Date: 1/6/2025    Responsible User: MILKA Landeros                      Short-Term Goal: Patient will verbalize understanding of symptoms       Intervention: (Clinician) will educate on depression and depression symptoms       Due Date:  1/6/2025    Responsible User: MILKA Landeros                      Short-Term Goal: Patient's support system will participate in treatment       Intervention: (Clinician) will educate family on signs/symptoms of depression       Due Date: 1/6/2025    Responsible User: MILKA Landeros              Intervention: (Clinician) will educate family on coping skills       Due Date: 1/6/2025    Responsible User: MILKA Landeros              Intervention: (Clinician) will educate family on medication management       Due Date: 1/6/2025    Responsible User: MILKA Landeros                              Long-Range Goal: Patient will better manage their depression       Intervention: (Clinician) will encourage group therapy       Due Date: 1/6/2025    Responsible User: MILKA Landeros              Intervention: (Clinician) will emphasize the importance of medication adherence       Due Date: 1/6/2025    Responsible User: MILKA Landeros              Short-Term Goal: Patient will identify 4+ coping skills       Intervention: (Clinician) will work with patient to identify coping skills and encourage patient to practice daily       Due Date: 1/6/2025    Responsible User: MILKA Landeros              Intervention: (Clinician) will help patient practice daily       Due Date: 1/6/2025    Responsible User: MILKA Landeros              Intervention: (Clinician) will assist to find potential sources of hope       Due Date: 1/6/2025    Responsible User: MILKA Landeros              Intervention: (Clinician) will teach 3 Cognitive Behavioral Therapy skills       Due Date: 1/6/2025    Responsible User: MILKA Landeros                      Short-Term Goal: Patient will manage their medication       Intervention: (Clinician) will emphasize the importance of medication adherence       Due Date: 1/6/2025    Responsible User: MILKA Landeros                                         Signatures             Patient:  Jaqui Kumar  Date              Parent / Legal Guardian Signature                    Please Print Name  Relationship to Patient                   Signature  Date

## 2024-12-31 NOTE — PROGRESS NOTES
[facility]   Therapy Progress Note        Name: Jaqui Kumar  MRN: [id number]  : 1990  Age: 34 y.o.  Date of assessment: 2024  PCP: Joon Zamudio M.D.  Persons in attendance: Patient  Total session time: 56 minutes      Topics addressed in psychotherapy include: Met with the patient in person for an individual counseling session. The patient was last seen by this clinician on 2024.  Content of Therapy:   Met with the patient for an individual counseling session.  The patient discussed anger and displeasure with her father who has not been helpful despite her recent shoulder surgery.  Patient discussed his lack of help and her pushing beyond her limits.  Therapeutic Intervention:   This session, the therapeutic focus was on focusing on validating the patient's choices while helping her manage emotion.  Worked with the patient in helping her see the importance of self compassion in recovery.  Discussed with patient the impact of sleep in nutrition on her mental health.  (Clinician) will discuss Hypothetical Event Worry  (Clinician) will discuss the mental avoidance in worry     (Clinician) will discuss the importance of recognizing problems before they occur, seeing problems as part of a normal life and review problem solving skills       Plan:  Work with patient on progress towards treatment goals.   Impression:   1. Generalized anxiety disorder   2. Major depressive disorder, recurrent episode, moderate (HCC)   3. Chronic post-traumatic stress disorder (PTSD)   This dictation has been created using voice recognition software and/or scribes. The accuracy of the dictation is limited by the abilities of the software and the expertise of the scribes. I expect there may be some errors of grammar and possibly content. I made every attempt to manually correct the errors within my dictation. However, errors related to voice recognition software and/or scribes may  still exist and should be interpreted within the appropriate context.    Objective Observations:   Participation:Active verbal participation, Attentive, and Engaged   Grooming:Casual   Cognition:Alert and Fully Oriented   Eye Contact:Good   Mood:Euthymic and Anxious   Affect:Flexible and Full range   Thought Process:Logical and Goal-directed   Speech:Rate within normal limits and Volume within normal limits    Current Risk:   Suicide: low   Homicide: low   Self-Harm: low   Relapse: low   Safety Plan Reviewed: not applicable    Care Plan Updated: No    Does patient express agreement with the above plan? Yes     Diagnosis:  1. Generalized anxiety disorder    2. Major depressive disorder, recurrent episode, moderate (HCC)    3. Chronic post-traumatic stress disorder (PTSD)        Referral appointment(s) scheduled? No       LENA Landeros.

## 2025-01-24 ENCOUNTER — APPOINTMENT (OUTPATIENT)
Dept: BEHAVIORAL HEALTH | Facility: CLINIC | Age: 35
End: 2025-01-24
Payer: COMMERCIAL

## 2025-01-31 ENCOUNTER — TELEMEDICINE (OUTPATIENT)
Dept: BEHAVIORAL HEALTH | Facility: CLINIC | Age: 35
End: 2025-01-31
Payer: COMMERCIAL

## 2025-01-31 DIAGNOSIS — F51.4 NIGHT TERROR DISORDER: ICD-10-CM

## 2025-01-31 DIAGNOSIS — F33.1 MAJOR DEPRESSIVE DISORDER, RECURRENT EPISODE, MODERATE (HCC): ICD-10-CM

## 2025-01-31 DIAGNOSIS — F41.1 GENERALIZED ANXIETY DISORDER: ICD-10-CM

## 2025-01-31 RX ORDER — FLUOXETINE 40 MG/1
40 CAPSULE ORAL DAILY
Qty: 90 CAPSULE | Refills: 0 | Status: SHIPPED | OUTPATIENT
Start: 2025-01-31 | End: 2025-05-01

## 2025-01-31 NOTE — PROGRESS NOTES
[facility]   Follow Up Assessment  This evaluation was conducted via Teams using secure and encrypted videoconferencing technology. The patient was in a private location outside of their home in the [facility].    The patient's identity was confirmed and verbal consent was obtained for this virtual visit.   This visit was conduc  keira via Zoom using secure and encrypted videoconferencing technology.  The patient was in a private location in the [facility].  The patient's identity was confirmed and verbal consent was obtained for this virtual visit.    This provider informed the patient their medical records are totally confidential except for the use by other providers involved in their care, or if the patient signs a release, or to report instances of child or elder abuse, or if it is determined they are an immediate risk to harm themselves or others.    Name: Jaqui Kumar  MRN: [id number]  : 1990  Age: 35 y.o.  Date of assessment: 2025  PCP: Joon Zamudio M.D.    Subjective:  Chart reviewed prior to the virtual visit in a secure location at her place of employment.  She was last seen on 2024.  We reviewed her current medications, purposes, doses, etc.  She prefers to continue Prozac 40 mg a.m.  She is taking presents in 1 mg at bedtime for nightmares.  She uses Xanax 0.25 approximately 1-2 times per month.  She does see a psychotherapist here.    Objective:  She is alert, oriented, and cooperative.  Relatedness is good.  Grooming is good.  Speech is normal rate.  Anxious.  Memory is good.  Insight and judgment are good.  No indication of psychotic thinking.    Current Risk:       Suicidal: Not suicidal       Homicidal: Not homicidal       Self-Harm: No plan to harm self       Relapse(Low/Moderate/High):: Moderate       Crisis Safety Plan Reviewed: Discussed with patient    Diagnosis:   Major depressive disorder, recurrent  Chronic PTSD  Generalized  anxiety disorder with panic attacks  OCD    Treatment Plan:  The current treatment plan consists of quarterly psychiatric sessions designed to evaluate her depression, anxiety and panic attacks, and OCD.    Duration will be for a minimum of 12 months and will be reviewed at each visit.    Goals: Remission of depression with control of anxiety and panic attacks and elimination of nightmares.  Continue Prozac 40 mg a.m.  Use presents in 1 mg at bedtime for nightmares.  Use Xanax 0.25 very sparingly approximately 1-2 times per month.      Vinny Fishman M.D.    This note was created using voice recognition software (Dragon). The accuracy of the dictation is limited by the abilities of the software. I have reviewed the note prior to signing, however some errors in grammar and context are still possible. If you have any questions related to this note please do not hesitate to contact our office.

## 2025-02-10 ENCOUNTER — TELEMEDICINE (OUTPATIENT)
Dept: BEHAVIORAL HEALTH | Facility: CLINIC | Age: 35
End: 2025-02-10
Payer: COMMERCIAL

## 2025-02-10 DIAGNOSIS — F33.1 MAJOR DEPRESSIVE DISORDER, RECURRENT EPISODE, MODERATE (HCC): ICD-10-CM

## 2025-02-10 DIAGNOSIS — F41.1 GENERALIZED ANXIETY DISORDER: ICD-10-CM

## 2025-02-11 NOTE — PROGRESS NOTES
[facility]   Therapy Progress Note      This visit was conducted via MS Teams using secure and encrypted videoconferencing technology.  The patient was in a private location in the [facility].  The patient's identity was confirmed and verbal consent was obtained for this virtual visit.  Place of Service:   POS 10 -The patient is at Home during their visit           Name: Jaqui Kumar  MRN: [id number]  : 1990  Age: 35 y.o.  Date of assessment: 2/10/2025  PCP: Joon Zamudio M.D.  Persons in attendance: Patient  Total session time: 58 minutes    Objective Observations:   Participation:Active verbal participation, Attentive, Engaged, and Open to feedback   Grooming:Casual   Cognition:Alert and Fully Oriented   Eye Contact:Good   Mood:Euthymic   Affect:Flexible, Full range, and Congruent with content   Thought Process:Logical   Speech:Rate within normal limits and Volume within normal limits    Current Risk:   Suicide: low   Homicide: low   Self-Harm: low   Relapse: low   Safety Plan Reviewed: not applicable    Topics addressed in psychotherapy include:   Met with the patient via MS Teams for an individual therapy session.  The patient was last seen by this clinician on 2024  Content of Therapy:   The patient discussed that she had a breakdown after her father unloaded a load of hay from her truck and didn't bother to feed the horses.  Patient discussed that she continues to have post surgical shoulder pain.  Patient discussed concerns regarding her response to that incident.  Patient discussed her trauma from her maternal grandmother who weighed 500 pounds.  Patient reports she has recently gained weight and that is concerning to her.  Patient reported she wants to be happy and does not appear to be on that track.    Therapeutic Intervention:   This session, the therapeutic focus was on focusing on setting goals with the patient.  The patient reportedly has tried  every diet there is, and discussed with her Whole 30 as an option.  Worked with the patient on a commitment to follow through on developing a plan.  Patient reported embarrassment as a child.  Worked with the patient on creating a vision of her future self.  Plan:  Continue work with the patient in developing setting in maintaining treatment goals.  Continue working with patient in reducing depression and anxiety.  Impression:   1. Generalized anxiety disorder - maintaining  2. Major depressive disorder, recurrent episode, moderate (HCC) - maintaining  This dictation has been created using voice recognition software and/or scribes. The accuracy of the dictation is limited by the abilities of the software and the expertise of the scribes. I expect there may be some errors of grammar and possibly content. I made every attempt to manually correct the errors within my dictation. However, errors related to voice recognition software and/or scribes may still exist and should be interpreted within the appropriate context.    Care Plan Updated: No    Does patient express agreement with the above plan? Yes     Diagnosis:  1. Generalized anxiety disorder    2. Major depressive disorder, recurrent episode, moderate (HCC)        Referral appointment(s) scheduled? No       MILKA Landeros

## 2025-02-24 ENCOUNTER — OFFICE VISIT (OUTPATIENT)
Dept: BEHAVIORAL HEALTH | Facility: CLINIC | Age: 35
End: 2025-02-24
Payer: COMMERCIAL

## 2025-02-24 DIAGNOSIS — F41.1 GENERALIZED ANXIETY DISORDER: ICD-10-CM

## 2025-02-24 DIAGNOSIS — F43.12 CHRONIC POST-TRAUMATIC STRESS DISORDER (PTSD): ICD-10-CM

## 2025-02-24 DIAGNOSIS — F33.1 MAJOR DEPRESSIVE DISORDER, RECURRENT EPISODE, MODERATE (HCC): ICD-10-CM

## 2025-02-25 NOTE — PROGRESS NOTES
[facility]   Therapy Progress Note        Name: Jaqui Kumar  MRN: [id number]  : 1990  Age: 35 y.o.  Date of assessment: 2025  PCP: Joon Zamudio M.D.  Persons in attendance: Patient  Total session time: 56 minutes      Topics addressed in psychotherapy include: Met with the patient in person for an individual counseling session. The patient was last seen by this clinician on February 10, 2025.  Content of Therapy:   Met with the patient for an individual counseling session.  The patient discussed that she has started journaling and finding an effective.  Patient discussed that she has things in her life that she wants but putting her own wants and desires first does not seem appropriate and makes her feel selfish.  Patient discussed being upset with your father because he wants to purchase another motorcycle.  Currently his motorcycle loans are at $1000 per month.  Patient appears to be responsible for most finances and management of the home.  Patient discussed that she wants to show horses again and is working on retrieve in her trailer.  Therapeutic Intervention:   This session, the therapeutic focus was on validating the patient's concerns and processing the information with her.  Discussed with the patient her care for her father and increasing communications to get her needs met.  Worked with patient on self care as an essential part of life.  Plan:   Continue working with the patient in managing emotions.  Impression:  1. Generalized anxiety disorder - showing improvement   2. Chronic post-traumatic stress disorder (PTSD) - maintaining     This dictation has been created using voice recognition software and/or scribes. The accuracy of the dictation is limited by the abilities of the software and the expertise of the scribes. I expect there may be some errors of grammar and possibly content. I made every attempt to manually correct the errors within my dictation.  However, errors related to voice recognition software and/or scribes may still exist and should be interpreted within the appropriate context.    Objective Observations:   Participation:Active verbal participation, Attentive, and Engaged   Grooming:Casual   Cognition:Alert and Fully Oriented   Eye Contact:Good   Mood:Euthymic and Anxious   Affect:Flexible, Full range, and Congruent with content   Thought Process:Logical and Goal-directed   Speech:Rate within normal limits and Volume within normal limits    Current Risk:   Suicide: low   Homicide: low   Self-Harm: low   Relapse: low   Safety Plan Reviewed: not applicable    Care Plan Updated: No    Does patient express agreement with the above plan? Yes     Diagnosis:  1. Generalized anxiety disorder    2. Chronic post-traumatic stress disorder (PTSD)    3. Major depressive disorder, recurrent episode, moderate (HCC)        Referral appointment(s) scheduled? No       LENA Landeros.

## 2025-02-28 ENCOUNTER — HOSPITAL ENCOUNTER (OUTPATIENT)
Facility: MEDICAL CENTER | Age: 35
End: 2025-02-28
Attending: PHYSICIAN ASSISTANT
Payer: COMMERCIAL

## 2025-02-28 PROCEDURE — 87624 HPV HI-RISK TYP POOLED RSLT: CPT

## 2025-02-28 PROCEDURE — 88142 CYTOPATH C/V THIN LAYER: CPT

## 2025-03-04 ENCOUNTER — TELEMEDICINE (OUTPATIENT)
Dept: BEHAVIORAL HEALTH | Facility: CLINIC | Age: 35
End: 2025-03-04
Payer: COMMERCIAL

## 2025-03-04 DIAGNOSIS — F43.12 CHRONIC POST-TRAUMATIC STRESS DISORDER (PTSD): ICD-10-CM

## 2025-03-04 DIAGNOSIS — F41.1 GENERALIZED ANXIETY DISORDER: ICD-10-CM

## 2025-03-04 PROCEDURE — 90837 PSYTX W PT 60 MINUTES: CPT | Mod: 95 | Performed by: MARRIAGE & FAMILY THERAPIST

## 2025-03-05 NOTE — PROGRESS NOTES
[facility]   Therapy Progress Note      This visit was conducted via MS Teams using secure and encrypted videoconferencing technology.  The patient was in a private location in the [facility] [place].  The patient's identity was confirmed and verbal consent was obtained for this virtual visit.  Place of Service:   POS 10 -The patient is at Home during their visit             Name: Jaqui Kumar  MRN: [id number]  : 1990  Age: 35 y.o.  Date of assessment: 3/4/2025  PCP: Joon Zamudio M.D.  Persons in attendance: Patient  Total session time: 58 minutes    Objective Observations:   Participation:Active verbal participation, Attentive, Engaged, and Open to feedback   Grooming:Casual   Cognition:Alert and Fully Oriented   Eye Contact:Good   Mood:Euthymic and Anxious   Affect:Flexible, Full range, and Congruent with content   Thought Process:Logical and Goal-directed   Speech:Rate within normal limits and Volume within normal limits    Current Risk:   Suicide: low   Homicide: low   Self-Harm: low   Relapse: low   Safety Plan Reviewed: not applicable    Topics addressed in psychotherapy include:   Met with the patient via MS Teams for an individual counseling session. The patient was last seen by this clinician on 2025.  Content of Therapy:   Met with the patient for an individual counseling session.  The patient discussed that she feels she had a nervous breakdown into tears last week.  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  Patient discussed that she felt this way not because of being told “no”, but not feeling heard regarding her limitations.  Patient reported that she feels that she is a people pleaser and that is due to her trauma.  The patient reported that she wants to show her horse the sheer however she is unable to because of a recent shoulder surgery.  Therapeutic Intervention:   This  session, the therapeutic focus was on working with the patient on managing emotional this regulation.  Discussed with the patient the Bio psychosocial aspects of being a “people pleaser”. Assisted the patient in working on skills to manage emotions.  Provided supportive psychotherapy.  Plan:   Continue working with the patient in managing emotions.  Impression:  1. Generalized anxiety disorder - showing improvement   2. Chronic post-traumatic stress disorder (PTSD) - maintaining      This dictation has been created using voice recognition software and/or scribes. The accuracy of the dictation is limited by the abilities of the software and the expertise of the scribes. I expect there may be some errors of grammar and possibly content. I made every attempt to manually correct the errors within my dictation. However, errors related to voice recognition software and/or scribes may still exist and should be interpreted within the appropriate context.    Care Plan Updated: No    Does patient express agreement with the above plan? Yes     Diagnosis:  1. Generalized anxiety disorder    2. Chronic post-traumatic stress disorder (PTSD)        Referral appointment(s) scheduled? No       MILKA Landeros

## 2025-03-10 ENCOUNTER — OFFICE VISIT (OUTPATIENT)
Dept: BEHAVIORAL HEALTH | Facility: CLINIC | Age: 35
End: 2025-03-10
Payer: COMMERCIAL

## 2025-03-10 DIAGNOSIS — F43.12 CHRONIC POST-TRAUMATIC STRESS DISORDER (PTSD): ICD-10-CM

## 2025-03-10 DIAGNOSIS — F41.1 GENERALIZED ANXIETY DISORDER: ICD-10-CM

## 2025-03-10 LAB
HPV I/H RISK 1 DNA SPEC QL NAA+PROBE: NOT DETECTED
SPECIMEN SOURCE: NORMAL
THINPREP PAP, CYTOLOGY NL11781: NORMAL

## 2025-03-10 PROCEDURE — 90837 PSYTX W PT 60 MINUTES: CPT | Performed by: MARRIAGE & FAMILY THERAPIST

## 2025-03-10 NOTE — PROGRESS NOTES
[facility]   Therapy Progress Note        Name: Jaqui Kumar  MRN: [id number]  : 1990  Age: 35 y.o.  Date of assessment: 3/10/2025  PCP: Joon Zamudio M.D.  Persons in attendance: Patient  Total session time: 56 minutes      Topics addressed in psychotherapy include: Topics addressed in psychotherapy include:   Met with the patient in person for an individual counseling session. The patient was last seen by this clinician on 2025.  Content of Therapy:   Met with the patient for an individual counseling session.  The patient discussed that she was extremely frustrated however has not taken a Xanax or had a crying spell this week.  The patient discussed feeling that her direct supervisor does not know her job and is very patronizing in her comments.  Patient discussed that she released a study without verification and was worried about its all weekend.  Therapeutic Intervention:   This session, the therapeutic focus was on allowing negative thoughts and responsibilities that to be aligned in correct the areas.  Discussed with the patient documentation of “study released without verification per supervisor”.  Worked with the patient on understanding why this is caused her so much distress.  Explored the distress with the patient and identified thoughts of not being good enough in her childhood.  Plan:   Explore childhood memories of feeling inadequate.  Impression:  1. Generalized anxiety disorder - showing improvement in managing   2. Chronic post-traumatic stress disorder (PTSD) - maintaining      This dictation has been created using voice recognition software and/or scribes. The accuracy of the dictation is limited by the abilities of the software and the expertise of the scribes. I expect there may be some errors of grammar and possibly content. I made every attempt to manually correct the errors within my dictation. However, errors related to voice recognition  software and/or scribes may still exist and should be interpreted within the appropriate context.    Objective Observations:   Participation:Active verbal participation, Attentive, Engaged, and Open to feedback   Grooming:Casual   Cognition:Alert and Fully Oriented   Eye Contact:Good   Mood:Euthymic and Anxious   Affect:Flexible and Full range   Thought Process:Logical and Goal-directed   Speech:Rate within normal limits and Volume within normal limits    Current Risk:   Suicide: low   Homicide: low   Self-Harm: low   Relapse: low   Safety Plan Reviewed: not applicable    Care Plan Updated: No    Does patient express agreement with the above plan? Yes     Diagnosis:  1. Generalized anxiety disorder    2. Chronic post-traumatic stress disorder (PTSD)        Referral appointment(s) scheduled? No       LENA Landeros.

## 2025-03-25 ENCOUNTER — TELEMEDICINE (OUTPATIENT)
Dept: BEHAVIORAL HEALTH | Facility: CLINIC | Age: 35
End: 2025-03-25
Payer: COMMERCIAL

## 2025-03-25 DIAGNOSIS — F33.1 MAJOR DEPRESSIVE DISORDER, RECURRENT EPISODE, MODERATE (HCC): ICD-10-CM

## 2025-03-25 DIAGNOSIS — F41.1 GENERALIZED ANXIETY DISORDER: ICD-10-CM

## 2025-03-25 PROCEDURE — 90837 PSYTX W PT 60 MINUTES: CPT | Mod: 95 | Performed by: MARRIAGE & FAMILY THERAPIST

## 2025-03-26 NOTE — PROGRESS NOTES
[facility]   Therapy Progress Note      This visit was conducted via MS Teams using secure and encrypted videoconferencing technology.  The patient was in a private location in the state of [place].  The patient's identity was confirmed and verbal consent was obtained for this virtual visit.  Place of Service:   POS 10 -The patient is at Home during their visit           Name: Jaqui Kumar  MRN: [id number]  : 1990  Age: 35 y.o.  Date of assessment: 3/25/2025  PCP: Joon Zamudio M.D.  Persons in attendance: Patient  Total session time: 56 minutes    Objective Observations:   Participation:Active verbal participation, Attentive, Engaged, and Open to feedback   Grooming:Casual   Cognition:Alert and Fully Oriented   Eye Contact:Good   Mood:Euthymic and Anxious   Affect:Flexible and Full range   Thought Process:Logical and Goal-directed   Speech:Rate within normal limits and Volume within normal limits    Current Risk:   Suicide: low   Homicide: low   Self-Harm: low   Relapse: low   Safety Plan Reviewed: not applicable    Topics addressed in psychotherapy include: Met with the patient in person for an individual counseling session. The patient was last seen by this clinician on March 10, 2025.  Content of Therapy:   Met with the patient for an individual counseling session.  The patient discussed that she is been upset with their father recently.  She is not forgiven him for forgetting her birthday.  Patient discussed making a snide remark after he stated he was going out for his birthday.  The patient reported that she has been house sitting and is tired from the extra stress and her chronic pain.  The patient feels that her father was projecting by calling her lazy. Discussed stress fatigue.   Therapeutic Intervention:   This session, the therapeutic focus was on allowing the patient to discuss and process her feelings.  Discussed with the patient increase boundaries and  communications.  Patient feels she has a history of feeling inadequate as others have often pushed their work onto her.    Plan:   Continue exploring how childhood memories of feeling inadequate affects her now.   Impression:  1. Generalized anxiety disorder - showing improvement in managing   2. Chronic post-traumatic stress disorder (PTSD) - maintaining      This dictation has been created using voice recognition software and/or scribes. The accuracy of the dictation is limited by the abilities of the software and the expertise of the scribes. I expect there may be some errors of grammar and possibly content. I made every attempt to manually correct the errors within my dictation. However, errors related to voice recognition software and/or scribes may still exist and should be interpreted within the appropriate context.       Care Plan Updated: No    Does patient express agreement with the above plan? Yes     Diagnosis:  1. Generalized anxiety disorder    2. Major depressive disorder, recurrent episode, moderate (HCC)        Referral appointment(s) scheduled? No       LENA Landeros.

## 2025-05-05 ENCOUNTER — APPOINTMENT (OUTPATIENT)
Dept: BEHAVIORAL HEALTH | Facility: CLINIC | Age: 35
End: 2025-05-05
Payer: COMMERCIAL

## 2025-05-27 ENCOUNTER — TELEMEDICINE (OUTPATIENT)
Dept: BEHAVIORAL HEALTH | Facility: CLINIC | Age: 35
End: 2025-05-27
Payer: COMMERCIAL

## 2025-05-27 DIAGNOSIS — F43.12 CHRONIC POST-TRAUMATIC STRESS DISORDER (PTSD): ICD-10-CM

## 2025-05-27 DIAGNOSIS — F33.1 MAJOR DEPRESSIVE DISORDER, RECURRENT EPISODE, MODERATE (HCC): ICD-10-CM

## 2025-05-27 DIAGNOSIS — F41.1 GENERALIZED ANXIETY DISORDER: Primary | ICD-10-CM

## 2025-05-27 PROCEDURE — 90837 PSYTX W PT 60 MINUTES: CPT | Mod: 95 | Performed by: MARRIAGE & FAMILY THERAPIST

## 2025-05-27 NOTE — PROGRESS NOTES
[facility]   Therapy Progress Note      This visit was conducted via MS Teams using secure and encrypted videoconferencing technology.  The patient was in a private location in the state of [place].  The patient's identity was confirmed and verbal consent was obtained for this virtual visit.  Place of Service:   POS 10 -The patient is at Home during their visit             Name: Jaqui Kumar  MRN: [id number]  : 1990  Age: 35 y.o.  Date of assessment: 2025  PCP: Joon Zamudio M.D.  Persons in attendance: Patient  Total session time: 56 minutes    Objective Observations:   Participation:Active verbal participation, Attentive, and Engaged   Grooming:Casual   Cognition:Alert and Fully Oriented   Eye Contact:Good   Mood:Anxious   Affect:Flexible, Full range, Congruent with content, and Anxious   Thought Process:Logical and Goal-directed   Speech:Rate within normal limits and Volume within normal limits    Current Risk:   Suicide: low   Homicide: low   Self-Harm: low   Relapse: low   Safety Plan Reviewed: not applicable    Topics addressed in psychotherapy include: Met with the patient Via MS Teams for an individual counseling session. The patient was last seen by this clinician on 2025.    Content of Therapy:   The patient discussed that she received a text from her biological father stating that her grandmother had .  This was the patient's last living grandparent.  The patient also reported that one of her best friends had a stroke at the age of 33.  The patient discussed that her Bio father also started sending her texts and pictures from when she was a child.  The patient discussed that this opened up all whole box of trauma that has not been unpacked.  The patient reported that she was having flashbacks in weird memories and acknowledged “major abandonment issues” the patient reported that she took a week off for bereavement and self care.  The patient  discussed that her father was a narcissist and that she had memories of taking a lot of Dimetap and waking up in different rooms.    Therapeutic Intervention:   This session, the therapeutic focus was on exploring the patient's childhood trauma.  Worked with the patient on opening up and understanding some of that childhood trauma.  Discussed with the patient setting boundaries with her father and her Bio dad.  Discussed being in control of that valve of information and learning to say no.  Work through an exercise of saying no.  Discussed with the patient the ACT  saba and encouraged her to work through the values section.    Plan:   Continue exploring how childhood memories of feeling inadequate affects her now.     Impression:  1. Generalized anxiety disorder - increasing   2. Chronic post-traumatic stress disorder (PTSD) - increasing      This dictation has been created using voice recognition software and/or scribes. The accuracy of the dictation is limited by the abilities of the software and the expertise of the scribes. I expect there may be some errors of grammar and possibly content. I made every attempt to manually correct the errors within my dictation. However, errors related to voice recognition software and/or scribes may still exist and should be interpreted within the appropriate context.    Care Plan Updated: No    Does patient express agreement with the above plan? Yes     Diagnosis:  1. Generalized anxiety disorder    2. Major depressive disorder, recurrent episode, moderate (HCC)    3. Chronic post-traumatic stress disorder (PTSD)        Referral appointment(s) scheduled? No       LENA Landeros.

## 2025-06-12 ENCOUNTER — TELEMEDICINE (OUTPATIENT)
Dept: BEHAVIORAL HEALTH | Facility: CLINIC | Age: 35
End: 2025-06-12
Payer: COMMERCIAL

## 2025-06-12 DIAGNOSIS — F33.1 MAJOR DEPRESSIVE DISORDER, RECURRENT EPISODE, MODERATE (HCC): Primary | ICD-10-CM

## 2025-06-12 DIAGNOSIS — F41.1 GENERALIZED ANXIETY DISORDER: ICD-10-CM

## 2025-06-12 DIAGNOSIS — F43.12 CHRONIC POST-TRAUMATIC STRESS DISORDER (PTSD): ICD-10-CM

## 2025-06-12 PROCEDURE — 90837 PSYTX W PT 60 MINUTES: CPT | Mod: 95 | Performed by: MARRIAGE & FAMILY THERAPIST
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
## 2025-06-19 ENCOUNTER — APPOINTMENT (OUTPATIENT)
Dept: MEDICAL GROUP | Facility: LAB | Age: 35
End: 2025-06-19
Payer: COMMERCIAL

## 2025-07-10 ENCOUNTER — APPOINTMENT (OUTPATIENT)
Dept: MEDICAL GROUP | Facility: LAB | Age: 35
End: 2025-07-10
Payer: COMMERCIAL

## 2025-07-10 VITALS
DIASTOLIC BLOOD PRESSURE: 80 MMHG | TEMPERATURE: 97.3 F | RESPIRATION RATE: 16 BRPM | SYSTOLIC BLOOD PRESSURE: 120 MMHG | HEIGHT: 66 IN | BODY MASS INDEX: 41.78 KG/M2 | OXYGEN SATURATION: 99 % | WEIGHT: 260 LBS | HEART RATE: 68 BPM

## 2025-07-10 DIAGNOSIS — F33.1 MAJOR DEPRESSIVE DISORDER, RECURRENT EPISODE, MODERATE (HCC): ICD-10-CM

## 2025-07-10 DIAGNOSIS — Z00.00 WELL ADULT EXAM: Primary | ICD-10-CM

## 2025-07-10 DIAGNOSIS — E66.813 CLASS 3 SEVERE OBESITY DUE TO EXCESS CALORIES WITH SERIOUS COMORBIDITY AND BODY MASS INDEX (BMI) OF 40.0 TO 44.9 IN ADULT: ICD-10-CM

## 2025-07-10 PROCEDURE — 3079F DIAST BP 80-89 MM HG: CPT | Performed by: FAMILY MEDICINE

## 2025-07-10 PROCEDURE — 99395 PREV VISIT EST AGE 18-39: CPT | Performed by: FAMILY MEDICINE

## 2025-07-10 PROCEDURE — 3074F SYST BP LT 130 MM HG: CPT | Performed by: FAMILY MEDICINE

## 2025-07-10 RX ORDER — FLUOXETINE HYDROCHLORIDE 40 MG/1
40 CAPSULE ORAL DAILY
Qty: 90 CAPSULE | Refills: 3 | Status: SHIPPED | OUTPATIENT
Start: 2025-07-10

## 2025-07-10 ASSESSMENT — FIBROSIS 4 INDEX: FIB4 SCORE: 0.61

## 2025-07-10 NOTE — PROGRESS NOTES
Subjective:     CC:   Chief Complaint   Patient presents with    Annual Exam       HPI:   Jaqui Kumar is a 35 y.o. female who presents for annual exam    Patient has GYN provider: Yes  Last Pap Smear: ASCUS, HPV positive.  Following with OB/GYN.  Last Tdap: 2021  Received HPV series: Yes    Exercise: limited with shoulder surgery and other ortho issues  Diet: using tirzepatide    OB History   No obstetric history on file.      She  has no history on file for sexual activity.    She  has a past medical history of Anesthesia, Anxiety, Arrhythmia, Arthritis, Carpal tunnel syndrome, Clotting disorder (HCC), Depression, Migraine, and Pain (09/2017).  She  has a past surgical history that includes other (2011); gastric sleeve laparoscopy (N/A, 9/22/2017); liver biopsy laparoscopic (N/A, 9/22/2017); abdominal exploration; eye surgery; hernia repair; carpal tunnel release (Right, 09/03/2020); carpal tunnel release (Right, 3/2/2021); pr total knee arthroplasty (Right, 11/20/2023); pr shldr arthroscop,surg,w/rotat cuff repr (Right, 12/2/2024); and pr shldr arthroscop part debride 1-2 (Right, 12/2/2024).    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    Tricompartment osteoarthritis of right knee 09/29/2023    Major depressive disorder, recurrent episode, moderate (HCC) 08/08/2023    Generalized anxiety disorder 08/08/2023    Chronic post-traumatic stress disorder (PTSD) 07/30/2021     "Fibromyalgia 07/30/2021    Lateral epicondylitis of both elbows 07/09/2021    Complex regional pain syndrome type 1 of right upper extremity 07/09/2021    Right leg paresthesias 05/06/2020    Extensor tendon disruption 02/28/2020    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 Not diaphoretic. No distress.   Skin: Skin is warm and dry. No rash noted.  Head: Atraumatic without lesions.  Eyes: Conjunctivae and extraocular motions are normal. Pupils are equal, round, and reactive to light. No scleral icterus.   Ears:  External ears unremarkable.  Nose: Nares patent. Septum midline.  Mouth/Throat: Tongue normal. Oropharynx is clear and moist.   Neck: Supple, trachea midline.   Cardiovascular: Regular rate and rhythm, S1 and S2 without murmur, rubs, or gallops.    Respiratory: Effort normal. Clear to auscultation throughout. No adventitious sounds.   Extremities: No cyanosis, clubbing, erythema, nor edema. Distal pulses intact and symmetric.   Musculoskeletal: All major joints AROM full in all directions without pain.  Neurological: Moves all 4 extremities.  No facial droop.  Psychiatric:  Behavior, mood, and affect are appropriate.    Assessment and Plan:     1. Well adult exam  Health maintenance reviewed and updated.  Age-appropriate anticipatory guidance provided.  - CBC WITH DIFFERENTIAL; Future  - Comp Metabolic Panel; Future  - Lipid Profile; Future  - HEMOGLOBIN A1C; Future  - TSH WITH REFLEX TO FT4; Future  - " VITAMIN D,25 HYDROXY (DEFICIENCY); Future    2. Class 3 severe obesity due to excess calories with serious comorbidity and body mass index (BMI) of 40.0 to 44.9 in adult  Had significant side effects with higher dose tirzepatide and will go back down to 0.5 mg weekly.  - Tirzepatide-Weight Management 2.5 MG/0.5ML Solution Auto-injector; Inject 0.5 mL under the skin every 7 days.  Dispense: 2 mL; Refill: 3    3. Major depressive disorder, recurrent episode, moderate (HCC)  Following with psychiatry, stable.  Needs refill of Prozac.  - fluoxetine (PROZAC) 40 MG capsule; Take 1 Capsule by mouth every day.  Dispense: 90 Capsule; Refill: 3    Follow-up: Return in about 1 year (around 7/10/2026).          [1]   Social History  Tobacco Use    Smoking status: Never    Smokeless tobacco: Never   Vaping Use    Vaping status: Never Used   Substance Use Topics    Alcohol use: Not Currently     Comment: Since 4/2021    Drug use: Yes     Comment: CBD cream   [2]   Current Outpatient Medications   Medication Sig Dispense Refill    naproxen (NAPROSYN) 500 MG Tab Take 1 Tablet by mouth 2 times a day with meals. 60 Tablet 0    albuterol 108 (90 Base) MCG/ACT Aero Soln inhalation aerosol Inhale 2 Puffs every 6 hours as needed for Shortness of Breath. 8.5 g 0    promethazine-dextromethorphan (PROMETHAZINE-DM) 6.25-15 MG/5ML syrup Take 5 mL by mouth at bedtime as needed for Cough. (caution: may cause sedation) 118 mL 0    Tirzepatide-Weight Management 5 MG/0.5ML Solution Auto-injector Inject 0.5 mL under the skin every 7 days. 6 mL 3    diclofenac sodium (VOLTAREN) 1 % Gel Apply 1 Application topically 2 times a day.      methocarbamol (ROBAXIN) 750 MG Tab Take 750 mg by mouth 3 times a day as needed.      acetaminophen (TYLENOL) 500 MG Tab Take 500-1,000 mg by mouth every 6 hours as needed for Moderate Pain.      omeprazole (PRILOSEC) 20 MG delayed-release capsule Take 20 mg by mouth every day.  0     No current facility-administered  "medications for this visit.   [3]   Allergies  Allergen Reactions    Gabapentin      \"turned into a psychopath, tears etc.\"    Lyrica      Severe headaches    Latex      Itchy     "

## 2025-07-15 ENCOUNTER — APPOINTMENT (OUTPATIENT)
Dept: BEHAVIORAL HEALTH | Facility: CLINIC | Age: 35
End: 2025-07-15
Payer: COMMERCIAL

## 2025-07-16 ENCOUNTER — PATIENT MESSAGE (OUTPATIENT)
Dept: MEDICAL GROUP | Facility: LAB | Age: 35
End: 2025-07-16
Payer: COMMERCIAL

## 2025-07-16 DIAGNOSIS — E66.813 CLASS 3 SEVERE OBESITY DUE TO EXCESS CALORIES WITH SERIOUS COMORBIDITY AND BODY MASS INDEX (BMI) OF 40.0 TO 44.9 IN ADULT: Primary | ICD-10-CM

## 2025-07-19 ENCOUNTER — HOSPITAL ENCOUNTER (OUTPATIENT)
Dept: LAB | Facility: MEDICAL CENTER | Age: 35
End: 2025-07-19
Attending: FAMILY MEDICINE
Payer: COMMERCIAL

## 2025-07-19 DIAGNOSIS — Z00.00 WELL ADULT EXAM: ICD-10-CM

## 2025-07-19 LAB
25(OH)D3 SERPL-MCNC: 21 NG/ML (ref 30–100)
ALBUMIN SERPL BCP-MCNC: 3.9 G/DL (ref 3.2–4.9)
ALBUMIN/GLOB SERPL: 1.3 G/DL
ALP SERPL-CCNC: 78 U/L (ref 30–99)
ALT SERPL-CCNC: 10 U/L (ref 2–50)
ANION GAP SERPL CALC-SCNC: 13 MMOL/L (ref 7–16)
AST SERPL-CCNC: 15 U/L (ref 12–45)
BASOPHILS # BLD AUTO: 0.7 % (ref 0–1.8)
BASOPHILS # BLD: 0.05 K/UL (ref 0–0.12)
BILIRUB SERPL-MCNC: 0.2 MG/DL (ref 0.1–1.5)
BUN SERPL-MCNC: 22 MG/DL (ref 8–22)
CALCIUM ALBUM COR SERPL-MCNC: 9.6 MG/DL (ref 8.5–10.5)
CALCIUM SERPL-MCNC: 9.5 MG/DL (ref 8.5–10.5)
CHLORIDE SERPL-SCNC: 104 MMOL/L (ref 96–112)
CHOLEST SERPL-MCNC: 172 MG/DL (ref 100–199)
CO2 SERPL-SCNC: 21 MMOL/L (ref 20–33)
CREAT SERPL-MCNC: 0.83 MG/DL (ref 0.5–1.4)
EOSINOPHIL # BLD AUTO: 0.19 K/UL (ref 0–0.51)
EOSINOPHIL NFR BLD: 2.5 % (ref 0–6.9)
ERYTHROCYTE [DISTWIDTH] IN BLOOD BY AUTOMATED COUNT: 43.5 FL (ref 35.9–50)
EST. AVERAGE GLUCOSE BLD GHB EST-MCNC: 114 MG/DL
GFR SERPLBLD CREATININE-BSD FMLA CKD-EPI: 93 ML/MIN/1.73 M 2
GLOBULIN SER CALC-MCNC: 3.1 G/DL (ref 1.9–3.5)
GLUCOSE SERPL-MCNC: 93 MG/DL (ref 65–99)
HBA1C MFR BLD: 5.6 % (ref 4–5.6)
HCT VFR BLD AUTO: 37.6 % (ref 37–47)
HDLC SERPL-MCNC: 60 MG/DL
HGB BLD-MCNC: 12.4 G/DL (ref 12–16)
IMM GRANULOCYTES # BLD AUTO: 0.06 K/UL (ref 0–0.11)
IMM GRANULOCYTES NFR BLD AUTO: 0.8 % (ref 0–0.9)
LDLC SERPL CALC-MCNC: 97 MG/DL
LYMPHOCYTES # BLD AUTO: 2.56 K/UL (ref 1–4.8)
LYMPHOCYTES NFR BLD: 34 % (ref 22–41)
MCH RBC QN AUTO: 27.9 PG (ref 27–33)
MCHC RBC AUTO-ENTMCNC: 33 G/DL (ref 32.2–35.5)
MCV RBC AUTO: 84.7 FL (ref 81.4–97.8)
MONOCYTES # BLD AUTO: 0.46 K/UL (ref 0–0.85)
MONOCYTES NFR BLD AUTO: 6.1 % (ref 0–13.4)
NEUTROPHILS # BLD AUTO: 4.2 K/UL (ref 1.82–7.42)
NEUTROPHILS NFR BLD: 55.9 % (ref 44–72)
NRBC # BLD AUTO: 0 K/UL
NRBC BLD-RTO: 0 /100 WBC (ref 0–0.2)
PLATELET # BLD AUTO: 349 K/UL (ref 164–446)
PMV BLD AUTO: 10.1 FL (ref 9–12.9)
POTASSIUM SERPL-SCNC: 4.2 MMOL/L (ref 3.6–5.5)
PROT SERPL-MCNC: 7 G/DL (ref 6–8.2)
RBC # BLD AUTO: 4.44 M/UL (ref 4.2–5.4)
SODIUM SERPL-SCNC: 138 MMOL/L (ref 135–145)
TRIGL SERPL-MCNC: 73 MG/DL (ref 0–149)
TSH SERPL DL<=0.005 MIU/L-ACNC: 2.78 UIU/ML (ref 0.38–5.33)
WBC # BLD AUTO: 7.5 K/UL (ref 4.8–10.8)

## 2025-07-19 PROCEDURE — 85025 COMPLETE CBC W/AUTO DIFF WBC: CPT

## 2025-07-19 PROCEDURE — 82306 VITAMIN D 25 HYDROXY: CPT

## 2025-07-19 PROCEDURE — 80053 COMPREHEN METABOLIC PANEL: CPT

## 2025-07-19 PROCEDURE — 80061 LIPID PANEL: CPT

## 2025-07-19 PROCEDURE — 83036 HEMOGLOBIN GLYCOSYLATED A1C: CPT

## 2025-07-19 PROCEDURE — 84443 ASSAY THYROID STIM HORMONE: CPT

## 2025-07-19 PROCEDURE — 36415 COLL VENOUS BLD VENIPUNCTURE: CPT

## 2025-07-22 ENCOUNTER — TELEMEDICINE (OUTPATIENT)
Dept: BEHAVIORAL HEALTH | Facility: CLINIC | Age: 35
End: 2025-07-22
Payer: COMMERCIAL

## 2025-07-22 DIAGNOSIS — F41.1 GENERALIZED ANXIETY DISORDER: ICD-10-CM

## 2025-07-22 DIAGNOSIS — F43.12 CHRONIC POST-TRAUMATIC STRESS DISORDER (PTSD): ICD-10-CM

## 2025-07-22 DIAGNOSIS — F33.1 MAJOR DEPRESSIVE DISORDER, RECURRENT EPISODE, MODERATE (HCC): Primary | ICD-10-CM

## 2025-07-22 PROCEDURE — 90837 PSYTX W PT 60 MINUTES: CPT | Mod: 95 | Performed by: MARRIAGE & FAMILY THERAPIST
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
## 2025-07-25 ENCOUNTER — RESULTS FOLLOW-UP (OUTPATIENT)
Dept: MEDICAL GROUP | Facility: LAB | Age: 35
End: 2025-07-25
Payer: COMMERCIAL

## 2025-07-29 ENCOUNTER — APPOINTMENT (OUTPATIENT)
Dept: BEHAVIORAL HEALTH | Facility: CLINIC | Age: 35
End: 2025-07-29
Payer: COMMERCIAL

## 2025-07-29 DIAGNOSIS — E66.813 CLASS 3 SEVERE OBESITY DUE TO EXCESS CALORIES WITH SERIOUS COMORBIDITY AND BODY MASS INDEX (BMI) OF 40.0 TO 44.9 IN ADULT: ICD-10-CM

## 2025-08-05 ENCOUNTER — TELEMEDICINE (OUTPATIENT)
Dept: BEHAVIORAL HEALTH | Facility: CLINIC | Age: 35
End: 2025-08-05
Payer: COMMERCIAL

## 2025-08-05 DIAGNOSIS — F41.1 GENERALIZED ANXIETY DISORDER: ICD-10-CM

## 2025-08-05 DIAGNOSIS — F33.1 MAJOR DEPRESSIVE DISORDER, RECURRENT EPISODE, MODERATE (HCC): Primary | ICD-10-CM

## 2025-08-05 DIAGNOSIS — F43.12 CHRONIC POST-TRAUMATIC STRESS DISORDER (PTSD): ICD-10-CM

## 2025-08-05 PROCEDURE — 90837 PSYTX W PT 60 MINUTES: CPT | Mod: 95 | Performed by: MARRIAGE & FAMILY THERAPIST

## 2025-08-07 RX ORDER — TIRZEPATIDE 5 MG/.5ML
5 INJECTION, SOLUTION SUBCUTANEOUS
Qty: 6 ML | Refills: 3 | Status: SHIPPED | OUTPATIENT
Start: 2025-08-07 | End: 2025-08-26

## 2025-08-11 ENCOUNTER — HOSPITAL ENCOUNTER (OUTPATIENT)
Dept: LAB | Facility: MEDICAL CENTER | Age: 35
End: 2025-08-11
Attending: NURSE PRACTITIONER
Payer: COMMERCIAL

## 2025-08-11 LAB
CRP SERPL HS-MCNC: <0.3 MG/DL (ref 0–0.75)
ERYTHROCYTE [SEDIMENTATION RATE] IN BLOOD BY WESTERGREN METHOD: 28 MM/HOUR (ref 0–25)
RHEUMATOID FACT SER IA-ACNC: <10 IU/ML (ref 0–14)
URATE SERPL-MCNC: 4.6 MG/DL (ref 1.9–8.2)

## 2025-08-11 PROCEDURE — 36415 COLL VENOUS BLD VENIPUNCTURE: CPT

## 2025-08-11 PROCEDURE — 86812 HLA TYPING A B OR C: CPT

## 2025-08-11 PROCEDURE — 86200 CCP ANTIBODY: CPT

## 2025-08-11 PROCEDURE — 86038 ANTINUCLEAR ANTIBODIES: CPT

## 2025-08-11 PROCEDURE — 86431 RHEUMATOID FACTOR QUANT: CPT

## 2025-08-11 PROCEDURE — 84550 ASSAY OF BLOOD/URIC ACID: CPT

## 2025-08-11 PROCEDURE — 85652 RBC SED RATE AUTOMATED: CPT

## 2025-08-11 PROCEDURE — 86140 C-REACTIVE PROTEIN: CPT

## 2025-08-12 ENCOUNTER — TELEMEDICINE (OUTPATIENT)
Dept: BEHAVIORAL HEALTH | Facility: CLINIC | Age: 35
End: 2025-08-12
Payer: COMMERCIAL

## 2025-08-12 ENCOUNTER — PATIENT MESSAGE (OUTPATIENT)
Dept: MEDICAL GROUP | Facility: LAB | Age: 35
End: 2025-08-12
Payer: COMMERCIAL

## 2025-08-12 DIAGNOSIS — F33.1 MAJOR DEPRESSIVE DISORDER, RECURRENT EPISODE, MODERATE (HCC): Primary | ICD-10-CM

## 2025-08-12 DIAGNOSIS — F43.12 CHRONIC POST-TRAUMATIC STRESS DISORDER (PTSD): ICD-10-CM

## 2025-08-12 DIAGNOSIS — F41.1 GENERALIZED ANXIETY DISORDER: ICD-10-CM

## 2025-08-12 DIAGNOSIS — E66.813 CLASS 3 SEVERE OBESITY DUE TO EXCESS CALORIES WITH SERIOUS COMORBIDITY AND BODY MASS INDEX (BMI) OF 40.0 TO 44.9 IN ADULT: Primary | ICD-10-CM

## 2025-08-12 PROCEDURE — 90837 PSYTX W PT 60 MINUTES: CPT | Mod: 95 | Performed by: MARRIAGE & FAMILY THERAPIST

## 2025-08-13 LAB
HLA-B27 QL FC: NEGATIVE
NUCLEAR IGG SER QL IA: NORMAL

## 2025-08-14 LAB — CCP IGA+IGG SERPL IA-ACNC: 22 UNITS (ref 0–19)

## 2025-08-20 ENCOUNTER — SPECIALTY PHARMACY (OUTPATIENT)
Dept: VASCULAR LAB | Facility: MEDICAL CENTER | Age: 35
End: 2025-08-20
Payer: COMMERCIAL

## 2025-08-26 ENCOUNTER — TELEMEDICINE (OUTPATIENT)
Dept: VASCULAR LAB | Facility: MEDICAL CENTER | Age: 35
End: 2025-08-26
Attending: INTERNAL MEDICINE
Payer: COMMERCIAL

## 2025-08-26 DIAGNOSIS — E66.813 CLASS 3 SEVERE OBESITY DUE TO EXCESS CALORIES WITH SERIOUS COMORBIDITY AND BODY MASS INDEX (BMI) OF 40.0 TO 44.9 IN ADULT: Primary | ICD-10-CM

## 2025-08-26 PROCEDURE — RXMED WILLOW AMBULATORY MEDICATION CHARGE: Performed by: INTERNAL MEDICINE

## 2025-08-26 RX ORDER — CYCLOBENZAPRINE HCL 10 MG
10 TABLET ORAL 2 TIMES DAILY PRN
COMMUNITY
Start: 2025-08-11

## 2025-08-26 RX ORDER — PREGABALIN 75 MG/1
75 CAPSULE ORAL EVERY EVENING
COMMUNITY
Start: 2025-08-13

## 2025-08-27 ENCOUNTER — PHARMACY VISIT (OUTPATIENT)
Dept: PHARMACY | Facility: MEDICAL CENTER | Age: 35
End: 2025-08-27
Payer: COMMERCIAL

## 2025-09-22 ENCOUNTER — APPOINTMENT (OUTPATIENT)
Dept: BEHAVIORAL HEALTH | Facility: CLINIC | Age: 35
End: 2025-09-22
Payer: COMMERCIAL

## (undated) DEVICE — SUTURE 0 VICRYL PLUS CT-1 - 36 INCH (36/BX)

## (undated) DEVICE — MASK ANESTHESIA ADULT  - (100/CA)

## (undated) DEVICE — KIT ANESTHESIA W/CIRCUIT & 3/LT BAG W/FILTER (20EA/CA)

## (undated) DEVICE — CHLORAPREP 26 ML APPLICATOR - ORANGE TINT(25/CA)

## (undated) DEVICE — HUMID-VENT HEAT AND MOISTURE EXCHANGE- (50/BX)

## (undated) DEVICE — GLOVE, LITE (PAIR)

## (undated) DEVICE — SEALER ARTICULATING TISSUE NSEAL (6/BX)

## (undated) DEVICE — SODIUM CHL IRRIGATION 0.9% 1000ML (12EA/CA)

## (undated) DEVICE — WATER IRRIGATION STERILE 1000ML (12EA/CA)

## (undated) DEVICE — HEAD HOLDER JUNIOR/ADULT

## (undated) DEVICE — TUBE CONNECTING SUCTION - CLEAR PLASTIC STERILE 72 IN (50EA/CA)

## (undated) DEVICE — CANNULA W/SEAL 5X100 Z-THRE - ADED KII (12/BX)

## (undated) DEVICE — SUTURE GENERAL

## (undated) DEVICE — GLOVE BIOGEL SZ 7 SURGICAL PF LTX - (50PR/BX 4BX/CA)

## (undated) DEVICE — LACTATED RINGERS INJ 1000 ML - (14EA/CA 60CA/PF)

## (undated) DEVICE — RELOAD WITH GRIPPING SURGACE TECHNOLOGY GREEN 60MM (12EA/BX)

## (undated) DEVICE — TROCAR 5X100 NON BLADED Z-TH - READ KII (6/BX)

## (undated) DEVICE — Device

## (undated) DEVICE — GLOVE BIOGEL INDICATOR SZ 8 SURGICAL PF LTX - (50/BX 4BX/CA)

## (undated) DEVICE — SUTURE 2-0 VICRYL PLUS SH - 27 INCH (36/BX)

## (undated) DEVICE — PACK UPPER EXTREMITY SM OR - (3/CA)

## (undated) DEVICE — STOCKINET BIAS 4 IN STERILE - (20/CA)

## (undated) DEVICE — DRESSING NON ADHERENT 3 X 4 - STERILE (100/BX 12BX/CA)

## (undated) DEVICE — GOWN WARMING STANDARD FLEX - (30/CA)

## (undated) DEVICE — ELECTRODE DUAL RETURN W/ CORD - (50/PK)

## (undated) DEVICE — SUTURE 0 VICRYL PLUS CT-2 - 8 X 18 INCH (12/BX)

## (undated) DEVICE — NEPTUNE 4 PORT MANIFOLD - (20/PK)

## (undated) DEVICE — GLOVE BIOGEL INDICATOR SZ 7SURGICAL PF LTX - (50/BX 4BX/CA)

## (undated) DEVICE — GLOVE BIOGEL SZ 6.5 SURGICAL PF LTX (50PR/BX 4BX/CA)

## (undated) DEVICE — ELECTRODE 850 FOAM ADHESIVE - HYDROGEL RADIOTRNSPRNT (50/PK)

## (undated) DEVICE — BAG, SPONGE COUNT 50600

## (undated) DEVICE — PROTECTOR ULNA NERVE - (36PR/CA)

## (undated) DEVICE — SENSOR SPO2 NEO LNCS ADHESIVE (20/BX) SEE USER NOTES

## (undated) DEVICE — PADDING CAST 4 IN STERILE - 4 X 4 YDS (24/CA)

## (undated) DEVICE — SUCTION INSTRUMENT YANKAUER BULBOUS TIP W/O VENT (50EA/CA)

## (undated) DEVICE — SET SUCTION/IRRIGATION WITH DISPOSABLE TIP (6/CA )PART #0250-070-520 IS A SUB

## (undated) DEVICE — TROCAR SEPARATOR 15MMZTHREAD - (6/BX)

## (undated) DEVICE — CANISTER SUCTION RIGID RED 1500CC (40EA/CA)

## (undated) DEVICE — BAG SPONGE COUNT 10.25 X 32 - BLUE (250/CA)

## (undated) DEVICE — RELOAD WITH GRIPPING SURFACE TECHNOLOGY BLUE 60MM (12EA/BX)

## (undated) DEVICE — ELECTRODE 5MM LHK LAPSCP STERILE DISP- MEGADYNE  (5/CA)

## (undated) DEVICE — SUTURE 4-0 ETHILON PS-2 18 (12PK/BX)"

## (undated) DEVICE — GLOVE BIOGEL INDICATOR SZ 7.5 SURGICAL PF LTX - (50PR/BX 4BX/CA)

## (undated) DEVICE — SPECIMEN BAG ENDOCATCH II15MM 15MM SPECIMEN RETRIEVAL (3EA/CA)

## (undated) DEVICE — NEEDLE MONOPTY 14GAX16CM - (10EA/CA)

## (undated) DEVICE — SYSTEM CALIBARATION  GASTRECTOMY 40FR (5/BX)

## (undated) DEVICE — TUBE E-T HI-LO CUFF 7.0MM (10EA/PK)

## (undated) DEVICE — ENDOSTITCH10MM SUTURING DEVIC - (3/CA)

## (undated) DEVICE — KIT ROOM DECONTAMINATION

## (undated) DEVICE — CLIP APPLIER 10MM ENDO LARGE (3EA/BX)

## (undated) DEVICE — SYRINGE 50ML CATHETER TIP (40EA/BX 4BX/CA)

## (undated) DEVICE — GLOVE BIOGEL SZ 8 SURGICAL PF LTX - (50PR/BX 4BX/CA)

## (undated) DEVICE — BANDAID SHEER STRIP 3/4 IN (100EA/BX 12BX/CA)

## (undated) DEVICE — ENDOSTITCH LOAD UNIT 0 SURGI - 12/CA

## (undated) DEVICE — SLING ORTH UNV TIETX VLFM ARM

## (undated) DEVICE — RELOAD WITH GRIPPING SURFACE TECHNOLOGY GOLD 60MM (12EA/BX)

## (undated) DEVICE — APPLICATOR DUPLO SPRAYER (5EA/CA)

## (undated) DEVICE — PERISTRIP 60 STAPLE LINE REINFORCEMENT (6EA/CA)

## (undated) DEVICE — STAPLER POWERED 60MM (3EA/BX)

## (undated) DEVICE — SUTURE 4-0 VICRYL PLUSFS-1 - 27 INCH (36/BX)

## (undated) DEVICE — TUBING INSUFFLATION - (10/BX)